# Patient Record
Sex: MALE | Race: WHITE | NOT HISPANIC OR LATINO | Employment: UNEMPLOYED | URBAN - METROPOLITAN AREA
[De-identification: names, ages, dates, MRNs, and addresses within clinical notes are randomized per-mention and may not be internally consistent; named-entity substitution may affect disease eponyms.]

---
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
## 2023-02-22 ENCOUNTER — HOSPITAL ENCOUNTER (EMERGENCY)
Facility: HOSPITAL | Age: 18
Discharge: HOME | End: 2023-02-22
Attending: PEDIATRICS
Payer: MEDICAID

## 2023-02-22 VITALS
OXYGEN SATURATION: 100 % | WEIGHT: 100 LBS | BODY MASS INDEX: 17.71 KG/M2 | TEMPERATURE: 98 F | HEART RATE: 90 BPM | RESPIRATION RATE: 18 BRPM | SYSTOLIC BLOOD PRESSURE: 100 MMHG | DIASTOLIC BLOOD PRESSURE: 60 MMHG

## 2023-02-22 DIAGNOSIS — Q05.7 SPINA BIFIDA OF LUMBAR REGION WITHOUT HYDROCEPHALUS: ICD-10-CM

## 2023-02-22 DIAGNOSIS — N13.70 VESICOURETERAL REFLUX: ICD-10-CM

## 2023-02-22 DIAGNOSIS — N30.00 ACUTE CYSTITIS WITHOUT HEMATURIA: Primary | ICD-10-CM

## 2023-02-22 DIAGNOSIS — R10.9 FLANK PAIN: ICD-10-CM

## 2023-02-22 LAB
BACTERIA #/AREA URNS AUTO: ABNORMAL /HPF
BILIRUB UR QL STRIP: NEGATIVE
CLARITY UR REFRACT.AUTO: ABNORMAL
COLOR UR AUTO: YELLOW
GLUCOSE UR QL STRIP: NEGATIVE
HGB UR QL STRIP: ABNORMAL
HYALINE CASTS UR QL AUTO: 0 /LPF
KETONES UR QL STRIP: NEGATIVE
LEUKOCYTE ESTERASE UR QL STRIP: ABNORMAL
MICROSCOPIC COMMENT: ABNORMAL
NITRITE UR QL STRIP: NEGATIVE
PH UR STRIP: 7 [PH] (ref 5–8)
PROT UR QL STRIP: ABNORMAL
RBC #/AREA URNS AUTO: 31 /HPF (ref 0–4)
SP GR UR STRIP: 1.01 (ref 1–1.03)
URN SPEC COLLECT METH UR: ABNORMAL
WBC #/AREA URNS AUTO: >100 /HPF (ref 0–5)
WBC CLUMPS UR QL AUTO: ABNORMAL

## 2023-02-22 PROCEDURE — 25000003 PHARM REV CODE 250: Performed by: PEDIATRICS

## 2023-02-22 PROCEDURE — 87186 SC STD MICRODIL/AGAR DIL: CPT | Performed by: PEDIATRICS

## 2023-02-22 PROCEDURE — 99285 EMERGENCY DEPT VISIT HI MDM: CPT | Mod: 25

## 2023-02-22 PROCEDURE — 63600175 PHARM REV CODE 636 W HCPCS: Performed by: PEDIATRICS

## 2023-02-22 PROCEDURE — 96374 THER/PROPH/DIAG INJ IV PUSH: CPT

## 2023-02-22 PROCEDURE — 87086 URINE CULTURE/COLONY COUNT: CPT | Performed by: PEDIATRICS

## 2023-02-22 PROCEDURE — 99285 PR EMERGENCY DEPT VISIT,LEVEL V: ICD-10-PCS | Mod: ,,, | Performed by: PEDIATRICS

## 2023-02-22 PROCEDURE — 99285 EMERGENCY DEPT VISIT HI MDM: CPT | Mod: ,,, | Performed by: PEDIATRICS

## 2023-02-22 PROCEDURE — 81001 URINALYSIS AUTO W/SCOPE: CPT | Performed by: PEDIATRICS

## 2023-02-22 PROCEDURE — 87077 CULTURE AEROBIC IDENTIFY: CPT | Performed by: PEDIATRICS

## 2023-02-22 PROCEDURE — 87088 URINE BACTERIA CULTURE: CPT | Performed by: PEDIATRICS

## 2023-02-22 RX ORDER — IBUPROFEN 400 MG/1
400 TABLET ORAL
Status: COMPLETED | OUTPATIENT
Start: 2023-02-22 | End: 2023-02-22

## 2023-02-22 RX ORDER — SULFAMETHOXAZOLE AND TRIMETHOPRIM 800; 160 MG/1; MG/1
1 TABLET ORAL 2 TIMES DAILY
Qty: 14 TABLET | Refills: 0 | Status: ON HOLD | OUTPATIENT
Start: 2023-02-22 | End: 2023-02-26

## 2023-02-22 RX ORDER — SULFAMETHOXAZOLE AND TRIMETHOPRIM 800; 160 MG/1; MG/1
1 TABLET ORAL 2 TIMES DAILY
Qty: 14 TABLET | Refills: 0 | Status: SHIPPED | OUTPATIENT
Start: 2023-02-22 | End: 2023-02-22 | Stop reason: SDUPTHER

## 2023-02-22 RX ORDER — ACETAMINOPHEN 325 MG/1
650 TABLET ORAL
Status: COMPLETED | OUTPATIENT
Start: 2023-02-22 | End: 2023-02-22

## 2023-02-22 RX ORDER — SULFAMETHOXAZOLE AND TRIMETHOPRIM 800; 160 MG/1; MG/1
1 TABLET ORAL
Status: COMPLETED | OUTPATIENT
Start: 2023-02-22 | End: 2023-02-22

## 2023-02-22 RX ORDER — OXYCODONE AND ACETAMINOPHEN 5; 325 MG/1; MG/1
TABLET ORAL
Qty: 18 TABLET | Refills: 0 | Status: ON HOLD | OUTPATIENT
Start: 2023-02-22 | End: 2023-02-26

## 2023-02-22 RX ORDER — MORPHINE SULFATE 2 MG/ML
1.5 INJECTION, SOLUTION INTRAMUSCULAR; INTRAVENOUS
Status: COMPLETED | OUTPATIENT
Start: 2023-02-22 | End: 2023-02-22

## 2023-02-22 RX ADMIN — IBUPROFEN 400 MG: 400 TABLET ORAL at 03:02

## 2023-02-22 RX ADMIN — ACETAMINOPHEN 650 MG: 325 TABLET ORAL at 03:02

## 2023-02-22 RX ADMIN — SULFAMETHOXAZOLE AND TRIMETHOPRIM 1 TABLET: 800; 160 TABLET ORAL at 04:02

## 2023-02-22 RX ADMIN — MORPHINE SULFATE 1.5 MG: 2 INJECTION, SOLUTION INTRAMUSCULAR; INTRAVENOUS at 02:02

## 2023-02-22 NOTE — ED NOTES
Pt brought in via EMS for flank pain, started @ 0800 today   [facility]   Mother gave 2 tylenol ER @ 0830, no relief   Denies fever, emesis or any other symptoms

## 2023-02-22 NOTE — ED PROVIDER NOTES
Encounter Date: 2/22/2023       History     Chief Complaint   Patient presents with    Flank Pain     Hx Spina Bifada/Chronic UTI     17-year-old male with a history of L4-L5 spina bifida since birth.  He had a spinal cord closure at birth.  Patient has a chronic history of bladder dysfunction and recurrent UTI.  His most recent urodynamic study showed  reflux.  Around 8:00 a.m. this morning, the patient was awakened from sleep by right-sided flank pain.  He took Tylenol, but the pain has persisted.  He was unable to get out of bed due to the discomfort and his parents called an ambulance to bring him to the ER.  Patient had an episode of pain like this once in the past associated with food poisoning.  He does not get this symptom with his UTIs.  He denies hematuria or dysuria.  Patient has had no fever, cough or cold symptoms, sore throat, nausea, vomiting, or diarrhea.  He last urinated around 6:00 a.m. today.  He denies trauma or strain.  Patient has a history of a  shunt which the parents report is not functioning and is not causing the patient a problem.  He recently had some tendon releases in his lower limbs.  He takes Flomax, oxybutynin, and Myrbetriq.  The parents report no hospitalizations except those related to surgery.  He has a history of latex allergy at birth.    The history is provided by the patient and a parent.   Review of patient's allergies indicates:  No Known Allergies  No past medical history on file.  No past surgical history on file.  No family history on file.     Review of Systems   Constitutional:  Negative for fever and unexpected weight change.   HENT:  Negative for congestion, rhinorrhea and sore throat.    Eyes:  Negative for discharge.   Respiratory:  Negative for cough.    Gastrointestinal:  Negative for diarrhea, nausea and vomiting.   Genitourinary:  Positive for flank pain. Negative for decreased urine volume, dysuria and hematuria.   Musculoskeletal:  Negative for gait  problem.   Skin:  Negative for rash.   Allergic/Immunologic: Negative for immunocompromised state.   Hematological:  Does not bruise/bleed easily.     Physical Exam     Initial Vitals [02/22/23 1343]   BP Pulse Resp Temp SpO2   100/60 (!) 116 18 97.8 °F (36.6 °C) 99 %      MAP       --         Physical Exam    Nursing note and vitals reviewed.  Constitutional: He appears well-developed and well-nourished. No distress.   Calm, cooperative, no acute distress.  Complaining of back pain.   HENT:   Head: Normocephalic.   Right Ear: External ear normal.   Left Ear: External ear normal.   Mouth/Throat: Oropharynx is clear and moist. No oropharyngeal exudate.   Eyes: Conjunctivae are normal.   Neck: Neck supple.   Cardiovascular:  Normal rate, regular rhythm and normal heart sounds.     Exam reveals no gallop and no friction rub.       No murmur heard.  Pulmonary/Chest: Breath sounds normal. No respiratory distress. He has no wheezes. He has no rhonchi. He has no rales.   Abdominal: Abdomen is soft. Bowel sounds are normal. He exhibits no distension and no mass. There is no abdominal tenderness.   No suprapubic tenderness There is no rebound and no guarding.   Musculoskeletal:         General: No tenderness or edema. Normal range of motion.      Cervical back: Neck supple.      Comments: Bilateral CVA tenderness, worse on the right.  Back without redness, swelling, increased warmth, or tenderness other than noted.  No rash.     Lymphadenopathy:     He has no cervical adenopathy.   Neurological: He is alert and oriented to person, place, and time. He has normal strength.   Skin: Skin is warm and dry. No rash noted.   Psychiatric: His behavior is normal.       ED Course   Procedures  Labs Reviewed   URINALYSIS, REFLEX TO URINE CULTURE - Abnormal; Notable for the following components:       Result Value    Protein, UA 1+ (*)     Occult Blood UA 2+ (*)     Leukocytes, UA 3+ (*)     All other components within normal limits     Narrative:     Specimen Source->Urine   URINALYSIS MICROSCOPIC - Abnormal; Notable for the following components:    RBC, UA 31 (*)     WBC, UA >100 (*)     WBC Clumps, UA Occasional (*)     All other components within normal limits    Narrative:     Specimen Source->Urine   CULTURE, URINE          Imaging Results              US Retroperitoneal Complete (Final result)  Result time 02/22/23 15:46:18   Procedure changed from US Kidney     Final result by David Salinas Jr., MD (02/22/23 15:46:18)                   Impression:      1. No kidney stones.  2. Chronic severe bilateral hydroureteronephrosis in this patient with neurogenic bladder, similar to prior.  3. Debris within the left renal pelvis and bladder.  Correlate with urinalysis.    Electronically signed by resident: Glendy Ortiz  Date:    02/22/2023  Time:    15:23    Electronically signed by: David Morales Jr  Date:    02/22/2023  Time:    15:46               Narrative:    EXAMINATION:  US RETROPERITONEAL COMPLETE    CLINICAL HISTORY:  hx  reflux, questionable hx of small kidney stones, r/o urinary obstruction.;    TECHNIQUE:  Ultrasound of the kidneys and urinary bladder was performed including color flow and Doppler evaluation of the kidneys.    COMPARISON:  Retroperitoneal ultrasound 06/01/2022.    FINDINGS:  Right kidney: The right kidney measures 11.0 cm. No cortical thinning. No loss of corticomedullary distinction. Resistive index measures 0.53.  No mass. No renal stone. Severe hydroureteronephrosis, similar prior.    Left kidney: The left kidney measures 11.6 cm. No cortical thinning. No loss of corticomedullary distinction. Resistive index measures 0.58.  No mass. No renal stone.  Severe hydroureteronephrosis, similar to prior.  There is echogenic debris within the collecting system.    The bladder is partially distended at the time of scanning and is trabeculated with echogenic debris within the lumen of the bladder.                                        Medications   sulfamethoxazole-trimethoprim 800-160mg per tablet 1 tablet (has no administration in time range)   morphine injection 1.5 mg (1.5 mg Intravenous Given 2/22/23 1413)   ibuprofen tablet 400 mg (400 mg Oral Given 2/22/23 1526)   acetaminophen tablet 650 mg (650 mg Oral Given 2/22/23 1526)     Medical Decision Making:   History:   I obtained history from: someone other than patient.  Old Medical Records: I decided to obtain old medical records.  Initial Assessment:   16-year-old male with a history of spina bifida and recurrent UTI presents with right-sided flank pain.  Differential Diagnosis:   Acute cystitis  Pyelonephritis   Urinary obstruction   Kidney stone   Musculoskeletal pain     Clinical Tests:   Lab Tests: Ordered and Reviewed       <> Summary of Lab: UA consistent with acute UTI.  Radiological Study: Ordered and Reviewed  ED Management:  Patient given pain medicine with some improvement.  UA consistent with acute UTI.  Ultrasound largely unchanged from the 1 in June.  Patient without fever, pyelonephritis less likely.  Discussed with family and considered admission for IV antibiotics.  However, they are comfortable taking him home as long as he has adequate pain medication and will follow-up with his kidney doctor at [facility].  Advised family to use ibuprofen as needed for pain and will give a prescription for Percocet as needed for breakthrough pain.  Will send patient home on a prescription for Bactrim which they have used successfully in the past.  Advised to follow-up with their kidney doctor in 2 days.                        Clinical Impression:   Final diagnoses:  [N30.00] Acute cystitis without hematuria (Primary)  [N13.70] Vesicoureteral reflux  [Q05.7] Spina bifida of lumbar region without hydrocephalus - hydrocephalus resolved,  shunt not working  [R10.9] Flank pain        ED Disposition Condition    Discharge Good          ED Prescriptions        Medication Sig Dispense Start Date End Date Auth. Provider    oxyCODONE-acetaminophen (PERCOCET) 5-325 mg per tablet 1-2 tablets every 4-6 hours as needed for pain 18 tablet 2/22/2023 -- Wilfredo Cuellar MD    sulfamethoxazole-trimethoprim 800-160mg (BACTRIM DS) 800-160 mg Tab  (Status: Discontinued) Take 1 tablet by mouth 2 (two) times daily. for 7 days 14 tablet 2/22/2023 2/22/2023 Wilfredo Cuellar MD    sulfamethoxazole-trimethoprim 800-160mg (BACTRIM DS) 800-160 mg Tab Take 1 tablet by mouth 2 (two) times daily. for 7 days 14 tablet 2/22/2023 3/1/2023 Wilfredo Cuellar MD          Follow-up Information       Follow up With Specialties Details Why Contact Info    Chi Escalante MD Pediatric Urology Schedule an appointment as soon as possible for a visit in 2 days  [address] [facility] [address]  [phone]               Wilfredo Cuellar MD  02/22/23 4289

## 2023-02-24 LAB — BACTERIA UR CULT: ABNORMAL

## 2023-02-25 ENCOUNTER — HOSPITAL ENCOUNTER (INPATIENT)
Facility: HOSPITAL | Age: 18
LOS: 3 days | Discharge: HOME HEALTH | DRG: 690 | End: 2023-02-28
Attending: EMERGENCY MEDICINE | Admitting: EMERGENCY MEDICINE
Payer: MEDICAID

## 2023-02-25 DIAGNOSIS — R07.9 CHEST PAIN: ICD-10-CM

## 2023-02-25 DIAGNOSIS — N12 PYELONEPHRITIS: ICD-10-CM

## 2023-02-25 DIAGNOSIS — Q05.7 SPINA BIFIDA OF LUMBAR REGION WITHOUT HYDROCEPHALUS: ICD-10-CM

## 2023-02-25 DIAGNOSIS — Q05.9 MYELOMENINGOCELE: Primary | ICD-10-CM

## 2023-02-25 LAB
ALBUMIN SERPL BCP-MCNC: 4.1 G/DL (ref 3.5–5.2)
ALP SERPL-CCNC: 78 U/L (ref 55–135)
ALT SERPL W/O P-5'-P-CCNC: 14 U/L (ref 10–44)
ANION GAP SERPL CALC-SCNC: 11 MMOL/L (ref 8–16)
AST SERPL-CCNC: 22 U/L (ref 10–40)
BACTERIA #/AREA URNS AUTO: ABNORMAL /HPF
BASOPHILS # BLD AUTO: 0.06 K/UL (ref 0–0.2)
BASOPHILS NFR BLD: 0.6 % (ref 0–1.9)
BILIRUB SERPL-MCNC: 0.5 MG/DL (ref 0.1–1)
BILIRUB UR QL STRIP: NEGATIVE
BUN SERPL-MCNC: 19 MG/DL (ref 6–20)
CALCIUM SERPL-MCNC: 10 MG/DL (ref 8.7–10.5)
CHLORIDE SERPL-SCNC: 100 MMOL/L (ref 95–110)
CLARITY UR REFRACT.AUTO: ABNORMAL
CO2 SERPL-SCNC: 25 MMOL/L (ref 23–29)
COLOR UR AUTO: YELLOW
CREAT SERPL-MCNC: 1.1 MG/DL (ref 0.5–1.4)
DIFFERENTIAL METHOD: ABNORMAL
EOSINOPHIL # BLD AUTO: 0.2 K/UL (ref 0–0.5)
EOSINOPHIL NFR BLD: 1.9 % (ref 0–8)
ERYTHROCYTE [DISTWIDTH] IN BLOOD BY AUTOMATED COUNT: 13 % (ref 11.5–14.5)
EST. GFR  (NO RACE VARIABLE): >60 ML/MIN/1.73 M^2
GLUCOSE SERPL-MCNC: 135 MG/DL (ref 70–110)
GLUCOSE UR QL STRIP: NEGATIVE
HCT VFR BLD AUTO: 44.2 % (ref 40–54)
HGB BLD-MCNC: 14.7 G/DL (ref 14–18)
HGB UR QL STRIP: ABNORMAL
HYALINE CASTS UR QL AUTO: 4 /LPF
IMM GRANULOCYTES # BLD AUTO: 0.02 K/UL (ref 0–0.04)
IMM GRANULOCYTES NFR BLD AUTO: 0.2 % (ref 0–0.5)
KETONES UR QL STRIP: ABNORMAL
LEUKOCYTE ESTERASE UR QL STRIP: ABNORMAL
LYMPHOCYTES # BLD AUTO: 1.7 K/UL (ref 1–4.8)
LYMPHOCYTES NFR BLD: 18.7 % (ref 18–48)
MCH RBC QN AUTO: 28.1 PG (ref 27–31)
MCHC RBC AUTO-ENTMCNC: 33.3 G/DL (ref 32–36)
MCV RBC AUTO: 84 FL (ref 82–98)
MICROSCOPIC COMMENT: ABNORMAL
MONOCYTES # BLD AUTO: 0.9 K/UL (ref 0.3–1)
MONOCYTES NFR BLD: 9.6 % (ref 4–15)
NEUTROPHILS # BLD AUTO: 6.4 K/UL (ref 1.8–7.7)
NEUTROPHILS NFR BLD: 69 % (ref 38–73)
NITRITE UR QL STRIP: NEGATIVE
NRBC BLD-RTO: 0 /100 WBC
PH UR STRIP: 6 [PH] (ref 5–8)
PLATELET # BLD AUTO: 331 K/UL (ref 150–450)
PMV BLD AUTO: 8.3 FL (ref 9.2–12.9)
POTASSIUM SERPL-SCNC: 4.3 MMOL/L (ref 3.5–5.1)
PROT SERPL-MCNC: 8.3 G/DL (ref 6–8.4)
PROT UR QL STRIP: ABNORMAL
RBC # BLD AUTO: 5.24 M/UL (ref 4.6–6.2)
RBC #/AREA URNS AUTO: >100 /HPF (ref 0–4)
SODIUM SERPL-SCNC: 136 MMOL/L (ref 136–145)
SP GR UR STRIP: 1.01 (ref 1–1.03)
SQUAMOUS #/AREA URNS AUTO: 1 /HPF
URN SPEC COLLECT METH UR: ABNORMAL
WBC # BLD AUTO: 9.3 K/UL (ref 3.9–12.7)
WBC #/AREA URNS AUTO: >100 /HPF (ref 0–5)

## 2023-02-25 PROCEDURE — 25500020 PHARM REV CODE 255: Performed by: EMERGENCY MEDICINE

## 2023-02-25 PROCEDURE — 81001 URINALYSIS AUTO W/SCOPE: CPT

## 2023-02-25 PROCEDURE — 63600175 PHARM REV CODE 636 W HCPCS

## 2023-02-25 PROCEDURE — 99285 EMERGENCY DEPT VISIT HI MDM: CPT | Mod: ,,, | Performed by: EMERGENCY MEDICINE

## 2023-02-25 PROCEDURE — 87086 URINE CULTURE/COLONY COUNT: CPT

## 2023-02-25 PROCEDURE — 96375 TX/PRO/DX INJ NEW DRUG ADDON: CPT

## 2023-02-25 PROCEDURE — 99285 EMERGENCY DEPT VISIT HI MDM: CPT | Mod: 25

## 2023-02-25 PROCEDURE — 99285 PR EMERGENCY DEPT VISIT,LEVEL V: ICD-10-PCS | Mod: ,,, | Performed by: EMERGENCY MEDICINE

## 2023-02-25 PROCEDURE — 85025 COMPLETE CBC W/AUTO DIFF WBC: CPT

## 2023-02-25 PROCEDURE — 87186 SC STD MICRODIL/AGAR DIL: CPT

## 2023-02-25 PROCEDURE — 96365 THER/PROPH/DIAG IV INF INIT: CPT

## 2023-02-25 PROCEDURE — 87077 CULTURE AEROBIC IDENTIFY: CPT

## 2023-02-25 PROCEDURE — 25000003 PHARM REV CODE 250

## 2023-02-25 PROCEDURE — 12000002 HC ACUTE/MED SURGE SEMI-PRIVATE ROOM

## 2023-02-25 PROCEDURE — 87088 URINE BACTERIA CULTURE: CPT

## 2023-02-25 PROCEDURE — 80053 COMPREHEN METABOLIC PANEL: CPT

## 2023-02-25 RX ORDER — KETOROLAC TROMETHAMINE 30 MG/ML
15 INJECTION, SOLUTION INTRAMUSCULAR; INTRAVENOUS
Status: COMPLETED | OUTPATIENT
Start: 2023-02-25 | End: 2023-02-25

## 2023-02-25 RX ADMIN — CEFTRIAXONE 1 G: 1 INJECTION, POWDER, FOR SOLUTION INTRAMUSCULAR; INTRAVENOUS at 11:02

## 2023-02-25 RX ADMIN — KETOROLAC TROMETHAMINE 15 MG: 30 INJECTION, SOLUTION INTRAMUSCULAR; INTRAVENOUS at 09:02

## 2023-02-25 RX ADMIN — IOHEXOL 75 ML: 350 INJECTION, SOLUTION INTRAVENOUS at 10:02

## 2023-02-26 PROBLEM — Q07.01 CHIARI MALFORMATION TYPE II: Status: ACTIVE | Noted: 2018-04-25

## 2023-02-26 PROBLEM — Q05.9 MYELOMENINGOCELE: Status: ACTIVE | Noted: 2017-11-15

## 2023-02-26 PROBLEM — K59.2 NEUROGENIC BOWEL: Status: ACTIVE | Noted: 2018-04-25

## 2023-02-26 PROBLEM — E87.6 HYPOKALEMIA: Status: RESOLVED | Noted: 2022-05-31 | Resolved: 2023-02-26

## 2023-02-26 PROBLEM — Z98.2 VP (VENTRICULOPERITONEAL) SHUNT STATUS: Status: ACTIVE | Noted: 2017-11-15

## 2023-02-26 PROBLEM — N12 PYELONEPHRITIS: Status: ACTIVE | Noted: 2023-02-26

## 2023-02-26 PROBLEM — D72.829 LEUKOCYTOSIS: Status: RESOLVED | Noted: 2022-05-31 | Resolved: 2023-02-26

## 2023-02-26 LAB
ANION GAP SERPL CALC-SCNC: 12 MMOL/L (ref 8–16)
BASOPHILS # BLD AUTO: 0.06 K/UL (ref 0–0.2)
BASOPHILS NFR BLD: 0.7 % (ref 0–1.9)
BUN SERPL-MCNC: 24 MG/DL (ref 6–20)
CALCIUM SERPL-MCNC: 9.8 MG/DL (ref 8.7–10.5)
CHLORIDE SERPL-SCNC: 100 MMOL/L (ref 95–110)
CO2 SERPL-SCNC: 23 MMOL/L (ref 23–29)
CREAT SERPL-MCNC: 1 MG/DL (ref 0.5–1.4)
DIFFERENTIAL METHOD: ABNORMAL
EOSINOPHIL # BLD AUTO: 0.2 K/UL (ref 0–0.5)
EOSINOPHIL NFR BLD: 2.1 % (ref 0–8)
ERYTHROCYTE [DISTWIDTH] IN BLOOD BY AUTOMATED COUNT: 13.2 % (ref 11.5–14.5)
EST. GFR  (NO RACE VARIABLE): >60 ML/MIN/1.73 M^2
GLUCOSE SERPL-MCNC: 123 MG/DL (ref 70–110)
HCT VFR BLD AUTO: 42.4 % (ref 40–54)
HGB BLD-MCNC: 13.7 G/DL (ref 14–18)
IMM GRANULOCYTES # BLD AUTO: 0.03 K/UL (ref 0–0.04)
IMM GRANULOCYTES NFR BLD AUTO: 0.3 % (ref 0–0.5)
LYMPHOCYTES # BLD AUTO: 2.4 K/UL (ref 1–4.8)
LYMPHOCYTES NFR BLD: 26.6 % (ref 18–48)
MAGNESIUM SERPL-MCNC: 2.1 MG/DL (ref 1.6–2.6)
MCH RBC QN AUTO: 28 PG (ref 27–31)
MCHC RBC AUTO-ENTMCNC: 32.3 G/DL (ref 32–36)
MCV RBC AUTO: 87 FL (ref 82–98)
MONOCYTES # BLD AUTO: 0.8 K/UL (ref 0.3–1)
MONOCYTES NFR BLD: 8.4 % (ref 4–15)
NEUTROPHILS # BLD AUTO: 5.5 K/UL (ref 1.8–7.7)
NEUTROPHILS NFR BLD: 61.9 % (ref 38–73)
NRBC BLD-RTO: 0 /100 WBC
PHOSPHATE SERPL-MCNC: 4.8 MG/DL (ref 2.7–4.5)
PLATELET # BLD AUTO: 325 K/UL (ref 150–450)
PMV BLD AUTO: 9.1 FL (ref 9.2–12.9)
POTASSIUM SERPL-SCNC: 4.1 MMOL/L (ref 3.5–5.1)
RBC # BLD AUTO: 4.89 M/UL (ref 4.6–6.2)
SODIUM SERPL-SCNC: 135 MMOL/L (ref 136–145)
WBC # BLD AUTO: 8.94 K/UL (ref 3.9–12.7)

## 2023-02-26 PROCEDURE — 25000003 PHARM REV CODE 250

## 2023-02-26 PROCEDURE — 12000002 HC ACUTE/MED SURGE SEMI-PRIVATE ROOM

## 2023-02-26 PROCEDURE — 80048 BASIC METABOLIC PNL TOTAL CA: CPT

## 2023-02-26 PROCEDURE — 99223 PR INITIAL HOSPITAL CARE,LEVL III: ICD-10-PCS | Mod: ,,, | Performed by: INTERNAL MEDICINE

## 2023-02-26 PROCEDURE — 83735 ASSAY OF MAGNESIUM: CPT

## 2023-02-26 PROCEDURE — 63600175 PHARM REV CODE 636 W HCPCS

## 2023-02-26 PROCEDURE — 84100 ASSAY OF PHOSPHORUS: CPT

## 2023-02-26 PROCEDURE — 85025 COMPLETE CBC W/AUTO DIFF WBC: CPT

## 2023-02-26 PROCEDURE — 99223 1ST HOSP IP/OBS HIGH 75: CPT | Mod: ,,, | Performed by: INTERNAL MEDICINE

## 2023-02-26 RX ORDER — IBUPROFEN 200 MG
16 TABLET ORAL
Status: DISCONTINUED | OUTPATIENT
Start: 2023-02-26 | End: 2023-02-28 | Stop reason: HOSPADM

## 2023-02-26 RX ORDER — GLUCAGON 1 MG
1 KIT INJECTION
Status: DISCONTINUED | OUTPATIENT
Start: 2023-02-26 | End: 2023-02-28 | Stop reason: HOSPADM

## 2023-02-26 RX ORDER — TAMSULOSIN HYDROCHLORIDE 0.4 MG/1
1 CAPSULE ORAL NIGHTLY
Status: DISCONTINUED | OUTPATIENT
Start: 2023-02-26 | End: 2023-02-28 | Stop reason: HOSPADM

## 2023-02-26 RX ORDER — AMOXICILLIN 250 MG
1 CAPSULE ORAL DAILY
Status: DISCONTINUED | OUTPATIENT
Start: 2023-02-26 | End: 2023-02-28 | Stop reason: HOSPADM

## 2023-02-26 RX ORDER — SODIUM CHLORIDE 0.9 % (FLUSH) 0.9 %
10 SYRINGE (ML) INJECTION EVERY 12 HOURS PRN
Status: DISCONTINUED | OUTPATIENT
Start: 2023-02-26 | End: 2023-02-28 | Stop reason: HOSPADM

## 2023-02-26 RX ORDER — POLYETHYLENE GLYCOL 3350 17 G/17G
17 POWDER, FOR SOLUTION ORAL DAILY
Status: DISCONTINUED | OUTPATIENT
Start: 2023-02-26 | End: 2023-02-28 | Stop reason: HOSPADM

## 2023-02-26 RX ORDER — TAMSULOSIN HYDROCHLORIDE 0.4 MG/1
1 CAPSULE ORAL NIGHTLY
Status: DISCONTINUED | OUTPATIENT
Start: 2023-02-27 | End: 2023-02-26

## 2023-02-26 RX ORDER — OXYCODONE HYDROCHLORIDE 5 MG/1
5 TABLET ORAL EVERY 6 HOURS PRN
Status: DISCONTINUED | OUTPATIENT
Start: 2023-02-26 | End: 2023-02-28 | Stop reason: HOSPADM

## 2023-02-26 RX ORDER — HYDROCODONE BITARTRATE AND ACETAMINOPHEN 5; 325 MG/1; MG/1
1 TABLET ORAL
Status: COMPLETED | OUTPATIENT
Start: 2023-02-26 | End: 2023-02-26

## 2023-02-26 RX ORDER — IBUPROFEN 200 MG
24 TABLET ORAL
Status: DISCONTINUED | OUTPATIENT
Start: 2023-02-26 | End: 2023-02-28 | Stop reason: HOSPADM

## 2023-02-26 RX ORDER — NALOXONE HCL 0.4 MG/ML
0.02 VIAL (ML) INJECTION
Status: DISCONTINUED | OUTPATIENT
Start: 2023-02-26 | End: 2023-02-28 | Stop reason: HOSPADM

## 2023-02-26 RX ADMIN — OXYCODONE HYDROCHLORIDE 5 MG: 5 TABLET ORAL at 05:02

## 2023-02-26 RX ADMIN — HYDROCODONE BITARTRATE AND ACETAMINOPHEN 1 TABLET: 5; 325 TABLET ORAL at 01:02

## 2023-02-26 RX ADMIN — CEFTRIAXONE 2 G: 2 INJECTION, POWDER, FOR SOLUTION INTRAMUSCULAR; INTRAVENOUS at 11:02

## 2023-02-26 RX ADMIN — OXYCODONE HYDROCHLORIDE 5 MG: 5 TABLET ORAL at 11:02

## 2023-02-26 RX ADMIN — OXYBUTYNIN CHLORIDE 15 MG: 10 TABLET, EXTENDED RELEASE ORAL at 08:02

## 2023-02-26 RX ADMIN — SENNOSIDES AND DOCUSATE SODIUM 1 TABLET: 50; 8.6 TABLET ORAL at 08:02

## 2023-02-26 RX ADMIN — OXYCODONE HYDROCHLORIDE 5 MG: 5 TABLET ORAL at 09:02

## 2023-02-26 RX ADMIN — TAMSULOSIN HYDROCHLORIDE 0.4 MG: 0.4 CAPSULE ORAL at 09:02

## 2023-02-26 RX ADMIN — POLYETHYLENE GLYCOL 3350 17 G: 17 POWDER, FOR SOLUTION ORAL at 08:02

## 2023-02-26 NOTE — MEDICAL/APP STUDENT
Hospital Medicine  History and Physical Exam    Team: [facility] 2 Augustine Caal  Admit Date: 2/25/2023  JOSIAH   Principal Problem:  Pyelonephritis   Patient information was obtained from patient, parent, and ER records.   Primary care Physician: Hazel Monsalve MD  Code status: Full Code      Subjective:     HPI: Kenji Malone is a 17 y.o. male with a history of spina bifida in L4-L5, VUR, bilateral hydronephrosis, recurrent UTIs presented for dysuria & severe bilateral flank pain. He was initially admitted on Wed (2/22/23) for a UTI with mild R-sided flank pain. He was given a course of home Bactrim and completed it, but then started experiencing burning with urination, foul-smelling, darker urine, with now bilateral flank pain 10/10. He was afebrile, no n/v/d, no recent traumas/falls, no changes in sensation. He has not had a BM since Wednesday, normally 1-2 BM daily.     Past Medical History:   Past Medical History:   Diagnosis Date    Bilateral hydronephrosis 6/1/2022    Neurogenic bladder 6/1/2022    Spina bifida of lumbar region without hydrocephalus 2/22/2023    hydrocephalus resolved,  shunt not working         Past Surgical History:   - Tendon release procedure   -  shunt     Social History:   - Lives in [place] with mom  - Home-schooled      Family History: family history is not on file.    Medications:   Prior to Admission medications    Medication Sig Start Date End Date Taking? Authorizing Provider   MYRBETRIQ 50 mg Tb24 Take 1 tablet by mouth nightly. 4/30/22  Yes Historical Provider   oxybutynin (DITROPAN XL) 15 MG TR24 Take 15 mg by mouth every morning. 4/30/22  Yes Historical Provider   tamsulosin (FLOMAX) 0.4 mg Cap Take 1 capsule by mouth nightly. 4/30/22  Yes Historical Provider   senna (SENOKOT) 8.6 mg tablet Take 1 tablet by mouth 2 (two) times a day. For constipation 6/2/22   Caren Lee PA-C   oxyCODONE-acetaminophen (PERCOCET) 5-325 mg per tablet 1-2 tablets every 4-6 hours as  needed for pain 2/22/23 2/26/23  Wilfredo Cuellar MD   sulfamethoxazole-trimethoprim 800-160mg (BACTRIM DS) 800-160 mg Tab Take 1 tablet by mouth 2 (two) times daily. for 7 days 2/22/23 2/26/23  Wilfredo Cuellar MD       Allergies: Patient is allergic to latex, natural rubber.    Review of Systems  Review of Systems   Constitutional:  Negative for chills, fever and weight loss.   Eyes:  Negative for blurred vision.   Respiratory:  Negative for cough and shortness of breath.    Cardiovascular:  Negative for chest pain and leg swelling.   Gastrointestinal:  Positive for constipation. Negative for abdominal pain, heartburn, nausea and vomiting.   Genitourinary:  Positive for dysuria and flank pain.   Musculoskeletal:  Positive for back pain. Negative for falls.   Skin:  Negative for itching and rash.   Neurological:  Negative for tremors, sensory change, weakness and headaches.       Objective:     Physical Exam  Temp:  [97.4 °F (36.3 °C)-98.6 °F (37 °C)]   Pulse:  [73-96]   Resp:  [16-20]   BP: (101-119)/(61-70)   SpO2:  [95 %-100 %]   There is no height or weight on file to calculate BMI.     Physical Exam  Constitutional:       General: He is not in acute distress.     Appearance: Normal appearance.   HENT:      Head: Normocephalic.      Mouth/Throat:      Mouth: Mucous membranes are moist.   Cardiovascular:      Rate and Rhythm: Normal rate and regular rhythm.      Pulses: Normal pulses.      Heart sounds: Normal heart sounds.   Pulmonary:      Effort: Pulmonary effort is normal.      Breath sounds: Normal breath sounds.   Abdominal:      General: Abdomen is flat. Bowel sounds are normal.      Palpations: Abdomen is soft.      Tenderness: There is right CVA tenderness and left CVA tenderness. There is no guarding.   Skin:     General: Skin is warm.   Neurological:      General: No focal deficit present.      Mental Status: He is alert and oriented to person, place, and time.      Sensory: No sensory deficit.       Motor: No weakness.         Recent Labs   Lab 02/25/23 2102 02/26/23  0343   WBC 9.30 8.94   HGB 14.7 13.7*   HCT 44.2 42.4    325     Recent Labs   Lab 02/25/23 2102 02/26/23  0343    135*   K 4.3 4.1    100   CO2 25 23   BUN 19 24*   CREATININE 1.1 1.0   * 123*   CALCIUM 10.0 9.8   MG  --  2.1   PHOS  --  4.8*     Recent Labs   Lab 02/25/23 2102   ALKPHOS 78   ALT 14   AST 22   ALBUMIN 4.1   PROT 8.3   BILITOT 0.5      No results for input(s): POCTGLUCOSE in the last 168 hours.  No results for input(s): LACTATE in the last 72 hours.     No results for input(s): CPK, CPKMB, MB, TROPONINI in the last 72 hours.  No results found for: HGBA1C    Active Hospital Problems    Diagnosis  POA    *Pyelonephritis [N12]  Yes    Spina bifida of lumbar region without hydrocephalus [Q05.7]  Not Applicable     hydrocephalus resolved,  shunt not working      Vesicoureteral reflux [N13.70]  Yes    Bilateral hydronephrosis [N13.30]  Yes    Neurogenic bladder [N31.9]  Yes     (ventriculoperitoneal) shunt status [Z98.2]  Yes      Resolved Hospital Problems   No resolved problems to display.           Assessment/Plan:     Kenji Malone is a 17y man with PMHx of spina bifida L4-L5, VUR, bilateral hydronephrosis, recurrent UTIs is here for dysuria & flank pain is on IV ceftriaxone and his VSS. His flank pain is reported 4/10 and improving.     Likely complicated pyelonephritis d/t flank pain, dysuria, pyuria and unchanged CT Abdo with contrast (compared to 5/31/22). Afebrile & no WBC casts detected, but this does not exclude pyelonephritis (UpToDate)     Ddx:   - Nephrolithiasis (Abdo US negative for stones),   - UTI (pain not localized to urethra),   - Psoas abscess (Patient appears to be responding to empiric Abx. Abdominal CT w/ contrast negative for abscess),   - Hydronephrosis (Abdo US negative for acute changes to chronic bilateral hydronephrosis)    Complicated pyelonephritis - afebrile, flank pain  improving  - IVFs  - IV Ceftriaxone   - Urine cultures pending  - Tylenol PRN for pain   - Monitor for dysuria    Bilateral hydronephrosis- unchanged per CT Abdo with contrast   - Tamsulosin    Spina bifida - unchanged and stable   -  shunt not intact  - F/u neurology outpatient    VUR - unchanged  - New home antibiotics regimen after culture results    Neurogenic bladder - unchanged and stable   - Mirabegron  - Oxybutinin    Constipation  - Miralax 1x dose  - Monitor for BM        Code: Full  Diet: Normal  GI PPx:   DVT PPx: None  Lines:  Drains:  Airways:    Wounds:     Goals of Care: Set him up with appropriate antibiotics, monitor for signs of sepsis, ensure adequate hydration  Discharge plan: Pending urine cultures and bowel movement        Augustine Caal, MS3  [facility]

## 2023-02-26 NOTE — ASSESSMENT & PLAN NOTE
Patient complaining of bilateral mid rib back pain.  History of bilateral hydronephrosis, however after discussion with patient and mother pain is new and only began several days ago and generally reports no pain at all at baseline.  Patient on Flomax, oxybutynin and Myrbetriq at home managed by his pediatric urologist  - Resume home Flomax  - Resume home oxybutynin

## 2023-02-26 NOTE — ED TRIAGE NOTES
Patient arrives to ED via EMS. Patient mother reports that the patient was seen here in the ED on Wednesday and was diagnosed with a UTI and started on bactrim. Patient reports that he has been having constant pain to his bilateral back. States that he has been having difficulty urinating as well as dysuria. Patient mother reports that patient has history of frequent UTIs. Patient has a history of Spina Bifida.

## 2023-02-26 NOTE — ASSESSMENT & PLAN NOTE
Stool burden noted on imaging.  Patient reinforces that he is not made stool despite good p.o. intake.    - Will start bowel regimen

## 2023-02-26 NOTE — ASSESSMENT & PLAN NOTE
This is a 17-year-old male with significant past medical history of spina bifida, vesicourethral reflux, and bilateral hydronephrosis presenting to the ED  after recent diagnosis of UTI being treated with Bactrim for pansensitive Proteus species several days ago  due to worsening bilateral mid back pain, burning upon urination, and alteration in urine color observed by mother   UA positive for gram negative rods, pending susceptibilities    - Continue ceftriaxone

## 2023-02-26 NOTE — PHARMACY MED REC
"Admission Medication History     The home medication history was taken by Antwon Navarro.    You may go to "Admission" then "Reconcile Home Medications" tabs to review and/or act upon these items.     The home medication list has been updated by the Pharmacy department.   Please read ALL comments highlighted in yellow.   Please address this information as you see fit.    Feel free to contact us if you have any questions or require assistance.      The medications listed below were removed from the home medication list. Please reorder if appropriate:  Patient reports no longer taking the following medication(s):  OXYCODONE-ACETAMINOPHEN 5-325 MG   SULFAMETHOXAZOLE-TRIMETHOPRIM 800-160 MG     Medications listed below were obtained from: Patient/family  PTA Medications   Medication Sig    MYRBETRIQ 50 mg Tb24   Take 1 tablet by mouth nightly.    oxybutynin (DITROPAN XL) 15 MG TR24   Take 15 mg by mouth every morning.    tamsulosin (FLOMAX) 0.4 mg Cap   Take 1 capsule by mouth nightly.    senna (SENOKOT) 8.6 mg tablet Take 1 tablet by mouth 2 (two) times a day. For constipation     Potential issues to be addressed PRIOR TO DISCHARGE  Patient reported not taking the following medications: (SENNA 8.6 MG). These medications remain on the home medication list. Please address accordingly.   Prescriptions could not be filled prior to admission: (SENNA 8.6 MG)    Antwon Navarro  EXT [id number]                 .        "

## 2023-02-26 NOTE — SUBJECTIVE & OBJECTIVE
No past medical history on file.    No past surgical history on file.    Review of patient's allergies indicates:   Allergen Reactions    Latex, natural rubber        No current facility-administered medications on file prior to encounter.     Current Outpatient Medications on File Prior to Encounter   Medication Sig    MYRBETRIQ 50 mg Tb24 Take 1 tablet by mouth once daily.    oxybutynin (DITROPAN XL) 15 MG TR24 Take 15 mg by mouth once daily.    oxyCODONE-acetaminophen (PERCOCET) 5-325 mg per tablet 1-2 tablets every 4-6 hours as needed for pain    senna (SENOKOT) 8.6 mg tablet Take 1 tablet by mouth 2 (two) times a day. For constipation    sulfamethoxazole-trimethoprim 800-160mg (BACTRIM DS) 800-160 mg Tab Take 1 tablet by mouth 2 (two) times daily. for 7 days    tamsulosin (FLOMAX) 0.4 mg Cap Take 1 capsule by mouth nightly.     Family History    None       Tobacco Use    Smoking status: Not on file    Smokeless tobacco: Not on file   Substance and Sexual Activity    Alcohol use: Not on file    Drug use: Not on file    Sexual activity: Not on file     Review of Systems   Constitutional:  Negative for appetite change, chills, diaphoresis and fever.   Respiratory:  Negative for chest tightness and shortness of breath.    Cardiovascular:  Negative for chest pain and leg swelling.   Gastrointestinal:  Positive for constipation. Negative for abdominal distention, abdominal pain, diarrhea, nausea and vomiting.   Genitourinary:  Positive for difficulty urinating (significant uro hx), flank pain and hematuria.   Musculoskeletal:  Positive for back pain and gait problem (baseline).   Neurological:  Negative for dizziness and light-headedness.   Psychiatric/Behavioral:  Negative for agitation, confusion and decreased concentration.    Objective:     Vital Signs (Most Recent):  Temp: 97.7 °F (36.5 °C) (02/26/23 0112)  Pulse: 73 (02/26/23 0109)  Resp: 18 (02/26/23 0110)  BP: 103/70 (02/26/23 0101)  SpO2: 97 % (02/26/23  0109) Vital Signs (24h Range):  Temp:  [97.7 °F (36.5 °C)-97.8 °F (36.6 °C)] 97.7 °F (36.5 °C)  Pulse:  [73-96] 73  Resp:  [16-20] 18  SpO2:  [95 %-100 %] 97 %  BP: (101-119)/(61-70) 103/70        There is no height or weight on file to calculate BMI.    Physical Exam  Constitutional:       General: He is not in acute distress.     Appearance: Normal appearance. He is not ill-appearing, toxic-appearing or diaphoretic.   HENT:      Head: Normocephalic and atraumatic.      Nose: No congestion or rhinorrhea.   Cardiovascular:      Rate and Rhythm: Normal rate and regular rhythm.      Pulses: Normal pulses.      Heart sounds: No murmur heard.  Pulmonary:      Effort: Pulmonary effort is normal. No respiratory distress.      Breath sounds: Normal breath sounds. No wheezing.   Abdominal:      General: Abdomen is flat. There is no distension.      Palpations: Abdomen is soft.      Tenderness: There is no abdominal tenderness.   Musculoskeletal:         General: Tenderness (B/L mid back) present.   Skin:     General: Skin is warm and dry.   Neurological:      General: No focal deficit present.      Mental Status: He is alert and oriented to person, place, and time.   Psychiatric:         Mood and Affect: Mood normal.         Behavior: Behavior normal.           Significant Labs: All pertinent labs within the past 24 hours have been reviewed.    Significant Imaging: I have reviewed all pertinent imaging results/findings within the past 24 hours.

## 2023-02-26 NOTE — H&P
[facility]  [facility]  History & Physical    Patient Name: Kenji Malone  MRN: [id number]  Patient Class: OP- Observation  Admission Date: 2/25/2023  Attending Physician: Gina Swann MD   Primary Care Provider: Hazel Monsalve MD         Patient information was obtained from patient, parent and ER records.     Subjective:     Principal Problem:Pyelonephritis    Chief Complaint:   Chief Complaint   Patient presents with    Flank Pain     Pt was diagnosed Wed with UTI and put on Bactrim, pt mother feels the antibiotic isnt working and pt now having flank pain and increased difficulty urinating. Pt having difficulty ambulating with pain. History of spina bifeda         HPI: Mr. Malone is a 17-year-old male with significant past medical history of spina bifida, bilateral hydronephrosis, neurogenic bladder, and vesicourethral reflux presenting to the ED along with his mother after 3 days of worsening bilateral mid rib/back pain, burning upon urination, and discoloration of urine stream.  Several days ago patient was found to have a UTI of pansensitive Proteus with his only symptom at the time being bilateral mid back pain.  He was sent home on Bactrim which which he has been taking as prescribed.  The pain worsened to the point of discomfort when taking deep breaths, with new onset burning upon urination, and discoloration of urine. His mother states that it is not unusual for him to have UTIs however it has been quite sometime since his last 1, and this is the worst symptom wise it has ever been. Patient denies recent fevers, chills, sweats, chest pain, shortness of breath, or N/V/D.  He follows up with urology regularly.       No past medical history on file.    No past surgical history on file.    Review of patient's allergies indicates:   Allergen Reactions    Latex, natural rubber        No current facility-administered medications on file prior to encounter.     Current Outpatient  Medications on File Prior to Encounter   Medication Sig    MYRBETRIQ 50 mg Tb24 Take 1 tablet by mouth once daily.    oxybutynin (DITROPAN XL) 15 MG TR24 Take 15 mg by mouth once daily.    oxyCODONE-acetaminophen (PERCOCET) 5-325 mg per tablet 1-2 tablets every 4-6 hours as needed for pain    senna (SENOKOT) 8.6 mg tablet Take 1 tablet by mouth 2 (two) times a day. For constipation    sulfamethoxazole-trimethoprim 800-160mg (BACTRIM DS) 800-160 mg Tab Take 1 tablet by mouth 2 (two) times daily. for 7 days    tamsulosin (FLOMAX) 0.4 mg Cap Take 1 capsule by mouth nightly.     Family History    None       Tobacco Use    Smoking status: Not on file    Smokeless tobacco: Not on file   Substance and Sexual Activity    Alcohol use: Not on file    Drug use: Not on file    Sexual activity: Not on file     Review of Systems   Constitutional:  Negative for appetite change, chills, diaphoresis and fever.   Respiratory:  Negative for chest tightness and shortness of breath.    Cardiovascular:  Negative for chest pain and leg swelling.   Gastrointestinal:  Positive for constipation. Negative for abdominal distention, abdominal pain, diarrhea, nausea and vomiting.   Genitourinary:  Positive for difficulty urinating (significant uro hx), flank pain and hematuria.   Musculoskeletal:  Positive for back pain and gait problem (baseline).   Neurological:  Negative for dizziness and light-headedness.   Psychiatric/Behavioral:  Negative for agitation, confusion and decreased concentration.    Objective:     Vital Signs (Most Recent):  Temp: 97.7 °F (36.5 °C) (02/26/23 0112)  Pulse: 73 (02/26/23 0109)  Resp: 18 (02/26/23 0110)  BP: 103/70 (02/26/23 0101)  SpO2: 97 % (02/26/23 0109) Vital Signs (24h Range):  Temp:  [97.7 °F (36.5 °C)-97.8 °F (36.6 °C)] 97.7 °F (36.5 °C)  Pulse:  [73-96] 73  Resp:  [16-20] 18  SpO2:  [95 %-100 %] 97 %  BP: (101-119)/(61-70) 103/70        There is no height or weight on file to calculate BMI.    Physical  Exam  Constitutional:       General: He is not in acute distress.     Appearance: Normal appearance. He is not ill-appearing, toxic-appearing or diaphoretic.   HENT:      Head: Normocephalic and atraumatic.      Nose: No congestion or rhinorrhea.   Cardiovascular:      Rate and Rhythm: Normal rate and regular rhythm.      Pulses: Normal pulses.      Heart sounds: No murmur heard.  Pulmonary:      Effort: Pulmonary effort is normal. No respiratory distress.      Breath sounds: Normal breath sounds. No wheezing.   Abdominal:      General: Abdomen is flat. There is no distension.      Palpations: Abdomen is soft.      Tenderness: There is no abdominal tenderness.   Musculoskeletal:         General: Tenderness (B/L mid back) present.   Skin:     General: Skin is warm and dry.   Neurological:      General: No focal deficit present.      Mental Status: He is alert and oriented to person, place, and time.   Psychiatric:         Mood and Affect: Mood normal.         Behavior: Behavior normal.           Significant Labs: All pertinent labs within the past 24 hours have been reviewed.    Significant Imaging: I have reviewed all pertinent imaging results/findings within the past 24 hours.    Assessment/Plan:     * Pyelonephritis  This is a 17-year-old male with significant past medical history of spina bifida, vesicourethral reflux, and bilateral hydronephrosis presenting to the ED  after recent diagnosis of UTI being treated with Bactrim for pansensitive Proteus species several days go  due to worsening bilateral mid back pain, burning upon urination, and alteration in urine color observed by mother   - UA positive for many bacteria, white blood cell, and blood   - patient started on ceftriaxone in the ED,  will continue  - daily CBC, BMP, initial  labs unremarkable   - urine culture pending       Spina bifida of lumbar region without hydrocephalus  Patient with known hx of spina bifida and associated urological pathology        Vesicoureteral reflux  See bilateral hydronephrosis       Constipation  Stool burden noted on imaging.  Patient reinforces that he is not made stool despite good p.o. intake.    - Will start bowel regimen      Bilateral hydronephrosis  Patient complaining of bilateral mid rib back pain.  History of bilateral hydronephrosis, however after discussion with patient and mother pain is new and only began several days ago and generally reports no pain at all at baseline.  Patient on Flomax, oxybutynin and Myrbetriq at home managed by his pediatric urologist  - Resume home Flomax  - Resume home oxybutynin       Neurogenic bladder  See bilateral hydronephrosis       VTE Risk Mitigation (From admission, onward)           Ordered     IP VTE LOW RISK PATIENT  Once         02/26/23 0109     Place sequential compression device  Until discontinued         02/26/23 0109                       [place], DO  Department of Hospital Medicine   [facility] - Emergency Dept

## 2023-02-26 NOTE — ASSESSMENT & PLAN NOTE
This is a 17-year-old male with significant past medical history of spina bifida, vesicourethral reflux, and bilateral hydronephrosis presenting to the ED by EMS after recent diagnosis of UTI being treated with Bactrim for pansensitive Proteus species several days go  due to worsening bilateral mid back pain, burning upon urination, and alteration in urine color observed by mother   - UA positive for many bacteria, white blood cell, and blood   - patient started on ceftriaxone in the ED,  will continue  - daily CBC, BMP, initial  labs unremarkable   - urine culture pending

## 2023-02-26 NOTE — HPI
Mr. Malone is a 17-year-old male with significant past medical history of spina bifida, bilateral hydronephrosis, neurogenic bladder, and vesicourethral reflux presenting to the ED  along with his mother after 3 days of worsening bilateral mid rib/back pain, burning upon urination, and discoloration of urine stream.  Several days ago patient was found to have a UTI of pansensitive Proteus with his only symptom at the time being bilateral mid back pain.  He was sent home on Bactrim which which he has been taking as prescribed.  The pain worsened to the point of discomfort when taking deep breaths, with new onset burning upon urination, and discoloration of urine.  His mother states that it is not unusual for him to have UTIs however it has been quite sometime since his last 1, and this is the worst symptom wise it has ever been. Patient denies recent fevers, chills, sweats, chest pain, shortness of breath, or N/V/D.  He follows up with urology regularly. IN the ED he was given CTX, and a ketorolac injection.

## 2023-02-27 LAB
ANION GAP SERPL CALC-SCNC: 11 MMOL/L (ref 8–16)
BACTERIA UR CULT: ABNORMAL
BASOPHILS # BLD AUTO: 0.05 K/UL (ref 0–0.2)
BASOPHILS NFR BLD: 0.6 % (ref 0–1.9)
BUN SERPL-MCNC: 21 MG/DL (ref 6–20)
CALCIUM SERPL-MCNC: 9.1 MG/DL (ref 8.7–10.5)
CHLORIDE SERPL-SCNC: 100 MMOL/L (ref 95–110)
CO2 SERPL-SCNC: 23 MMOL/L (ref 23–29)
CREAT SERPL-MCNC: 0.8 MG/DL (ref 0.5–1.4)
DIFFERENTIAL METHOD: ABNORMAL
EOSINOPHIL # BLD AUTO: 0.3 K/UL (ref 0–0.5)
EOSINOPHIL NFR BLD: 3.4 % (ref 0–8)
ERYTHROCYTE [DISTWIDTH] IN BLOOD BY AUTOMATED COUNT: 13.1 % (ref 11.5–14.5)
EST. GFR  (NO RACE VARIABLE): >60 ML/MIN/1.73 M^2
GLUCOSE SERPL-MCNC: 104 MG/DL (ref 70–110)
HCT VFR BLD AUTO: 39.5 % (ref 40–54)
HGB BLD-MCNC: 12.7 G/DL (ref 14–18)
IMM GRANULOCYTES # BLD AUTO: 0.01 K/UL (ref 0–0.04)
IMM GRANULOCYTES NFR BLD AUTO: 0.1 % (ref 0–0.5)
LYMPHOCYTES # BLD AUTO: 2.5 K/UL (ref 1–4.8)
LYMPHOCYTES NFR BLD: 29.7 % (ref 18–48)
MAGNESIUM SERPL-MCNC: 1.9 MG/DL (ref 1.6–2.6)
MCH RBC QN AUTO: 27.5 PG (ref 27–31)
MCHC RBC AUTO-ENTMCNC: 32.2 G/DL (ref 32–36)
MCV RBC AUTO: 86 FL (ref 82–98)
MONOCYTES # BLD AUTO: 0.8 K/UL (ref 0.3–1)
MONOCYTES NFR BLD: 9.3 % (ref 4–15)
NEUTROPHILS # BLD AUTO: 4.7 K/UL (ref 1.8–7.7)
NEUTROPHILS NFR BLD: 56.9 % (ref 38–73)
NRBC BLD-RTO: 0 /100 WBC
PHOSPHATE SERPL-MCNC: 4.6 MG/DL (ref 2.7–4.5)
PLATELET # BLD AUTO: 312 K/UL (ref 150–450)
PMV BLD AUTO: 8.7 FL (ref 9.2–12.9)
POTASSIUM SERPL-SCNC: 4.1 MMOL/L (ref 3.5–5.1)
RBC # BLD AUTO: 4.62 M/UL (ref 4.6–6.2)
SODIUM SERPL-SCNC: 134 MMOL/L (ref 136–145)
WBC # BLD AUTO: 8.28 K/UL (ref 3.9–12.7)

## 2023-02-27 PROCEDURE — 12000002 HC ACUTE/MED SURGE SEMI-PRIVATE ROOM

## 2023-02-27 PROCEDURE — 85025 COMPLETE CBC W/AUTO DIFF WBC: CPT

## 2023-02-27 PROCEDURE — 99232 SBSQ HOSP IP/OBS MODERATE 35: CPT | Mod: ,,, | Performed by: HOSPITALIST

## 2023-02-27 PROCEDURE — 63600175 PHARM REV CODE 636 W HCPCS

## 2023-02-27 PROCEDURE — 25000003 PHARM REV CODE 250

## 2023-02-27 PROCEDURE — 83735 ASSAY OF MAGNESIUM: CPT

## 2023-02-27 PROCEDURE — 84100 ASSAY OF PHOSPHORUS: CPT

## 2023-02-27 PROCEDURE — 80048 BASIC METABOLIC PNL TOTAL CA: CPT

## 2023-02-27 PROCEDURE — 36415 COLL VENOUS BLD VENIPUNCTURE: CPT

## 2023-02-27 PROCEDURE — 99232 PR SUBSEQUENT HOSPITAL CARE,LEVL II: ICD-10-PCS | Mod: ,,, | Performed by: HOSPITALIST

## 2023-02-27 PROCEDURE — 25000003 PHARM REV CODE 250: Performed by: STUDENT IN AN ORGANIZED HEALTH CARE EDUCATION/TRAINING PROGRAM

## 2023-02-27 RX ORDER — PSEUDOEPHEDRINE/ACETAMINOPHEN 30MG-500MG
100 TABLET ORAL
Status: COMPLETED | OUTPATIENT
Start: 2023-02-27 | End: 2023-02-27

## 2023-02-27 RX ADMIN — TAMSULOSIN HYDROCHLORIDE 0.4 MG: 0.4 CAPSULE ORAL at 09:02

## 2023-02-27 RX ADMIN — OXYCODONE HYDROCHLORIDE 5 MG: 5 TABLET ORAL at 09:02

## 2023-02-27 RX ADMIN — SENNOSIDES AND DOCUSATE SODIUM 1 TABLET: 50; 8.6 TABLET ORAL at 09:02

## 2023-02-27 RX ADMIN — OXYCODONE HYDROCHLORIDE 5 MG: 5 TABLET ORAL at 03:02

## 2023-02-27 RX ADMIN — SODIUM CHLORIDE 500 ML: 9 INJECTION, SOLUTION INTRAVENOUS at 04:02

## 2023-02-27 RX ADMIN — CEFTRIAXONE 2 G: 2 INJECTION, POWDER, FOR SOLUTION INTRAMUSCULAR; INTRAVENOUS at 09:02

## 2023-02-27 RX ADMIN — POLYETHYLENE GLYCOL 3350 17 G: 17 POWDER, FOR SOLUTION ORAL at 09:02

## 2023-02-27 RX ADMIN — Medication 100 ML: at 04:02

## 2023-02-27 RX ADMIN — OXYBUTYNIN CHLORIDE 15 MG: 10 TABLET, EXTENDED RELEASE ORAL at 09:02

## 2023-02-27 NOTE — PLAN OF CARE
Problem: Adult Inpatient Plan of Care  Goal: Plan of Care Review  Outcome: Ongoing, Progressing  Flowsheets (Taken 2/27/2023 1754)  Plan of Care Reviewed With:   patient   parent     Problem: Adult Inpatient Plan of Care  Goal: Absence of Hospital-Acquired Illness or Injury  Intervention: Identify and Manage Fall Risk  Flowsheets (Taken 2/27/2023 1754)  Safety Promotion/Fall Prevention:   assistive device/personal item within reach   family to remain at bedside   Fall Risk reviewed with patient/family     Problem: Adult Inpatient Plan of Care  Goal: Readiness for Transition of Care  Outcome: Ongoing, Progressing   Pt is AAOx4,VS WNL on room air. Pt has large BM after enema,verbalized relief . Pt turns and repositions independently,ambulates with assist. No skin breakdown noted. Pt pain and safety monitored q 1-2 hrs this shift .Bed locked and in lowest position. Rails elevated x 3. Brakes on. Call light and personal belongings in reach. Will continue monitor.

## 2023-02-27 NOTE — SUBJECTIVE & OBJECTIVE
Interval History: The patient is afebrile. He continues to have flank tenderness, more prominent on the left side. He denies chest pain, shortness of breath, or palpitations. The patient has not had a bowel movement since Wednesday. He is on senna and miralax.  An enema and or suppository will be considered if needed. Otherwise no acute events. The patient is eating his breakfast at the time of examination.     Review of Systems   Constitutional:  Negative for appetite change, chills, diaphoresis and fever.   Respiratory:  Negative for chest tightness and shortness of breath.    Cardiovascular:  Negative for chest pain and leg swelling.   Gastrointestinal:  Positive for constipation. Negative for abdominal distention, abdominal pain, diarrhea, nausea and vomiting.   Genitourinary:  Positive for difficulty urinating (significant uro hx), dysuria and flank pain.   Musculoskeletal:  Positive for back pain and gait problem (baseline).   Neurological:  Negative for dizziness and light-headedness.   Psychiatric/Behavioral:  Negative for agitation, confusion and decreased concentration.    Objective:     Vital Signs (Most Recent):  Temp: 98.1 °F (36.7 °C) (02/27/23 0755)  Pulse: 94 (02/27/23 0755)  Resp: 18 (02/27/23 0919)  BP: (!) 115/56 (02/27/23 0755)  SpO2: 97 % (02/27/23 0755)   Vital Signs (24h Range):  Temp:  [97.6 °F (36.4 °C)-98.2 °F (36.8 °C)] 98.1 °F (36.7 °C)  Pulse:  [75-94] 94  Resp:  [17-18] 18  SpO2:  [93 %-97 %] 97 %  BP: (102-115)/(55-70) 115/56     Weight: 45.4 kg (100 lb 1.4 oz)  Body mass index is 17.73 kg/m².    Intake/Output Summary (Last 24 hours) at 2/27/2023 1131  Last data filed at 2/27/2023 0635  Gross per 24 hour   Intake 600 ml   Output 200 ml   Net 400 ml      Physical Exam  Constitutional:       General: He is not in acute distress.     Appearance: Normal appearance. He is not ill-appearing, toxic-appearing or diaphoretic.   HENT:      Head: Normocephalic and atraumatic.      Nose: No  congestion or rhinorrhea.   Cardiovascular:      Rate and Rhythm: Normal rate and regular rhythm.      Pulses: Normal pulses.      Heart sounds: No murmur heard.  Pulmonary:      Effort: Pulmonary effort is normal. No respiratory distress.      Breath sounds: Normal breath sounds. No wheezing.   Abdominal:      General: Abdomen is flat. There is no distension.      Palpations: Abdomen is soft.      Tenderness: There is no abdominal tenderness.   Musculoskeletal:         General: Tenderness (B/L mid back) present.   Skin:     General: Skin is warm and dry.   Neurological:      General: No focal deficit present.      Mental Status: He is alert and oriented to person, place, and time.   Psychiatric:         Mood and Affect: Mood normal.         Behavior: Behavior normal.       Significant Labs: All pertinent labs within the past 24 hours have been reviewed.  Recent Lab Results         02/27/23  0327        Anion Gap 11       Baso # 0.05       Basophil % 0.6       BUN 21       Calcium 9.1       Chloride 100       CO2 23       Creatinine 0.8       Differential Method Automated       eGFR >60.0       Eos # 0.3       Eosinophil % 3.4       Glucose 104       Gran # (ANC) 4.7       Gran % 56.9       Hematocrit 39.5       Hemoglobin 12.7       Immature Grans (Abs) 0.01  Comment: Mild elevation in immature granulocytes is non specific and   can be seen in a variety of conditions including stress response,   acute inflammation, trauma and pregnancy. Correlation with other   laboratory and clinical findings is essential.         Immature Granulocytes 0.1       Lymph # 2.5       Lymph % 29.7       Magnesium 1.9       MCH 27.5       MCHC 32.2       MCV 86       Mono # 0.8       Mono % 9.3       MPV 8.7       nRBC 0       Phosphorus 4.6       Platelets 312       Potassium 4.1       RBC 4.62       RDW 13.1       Sodium 134       WBC 8.28               Significant Imaging: I have reviewed all pertinent imaging results/findings within  the past 24 hours.

## 2023-02-27 NOTE — HOSPITAL COURSE
17M presented to the hospital medicine service with suspected pyelonephritis in the setting of recently diagnosed UTI being treated outpatient with bactrim. Had R flank pain. CT did not show and renal fat stranding or stones, but clinical picture c/w pyelonephritis. CT A/P also showed known moderate hydrouretronephrosis and sign of fractured ventriculoperitoneal shunt with distal migration of the fractured part. Urine cultures grew pansensitive Proteus, same organism that grew on initial outpatient urine culture from 2/22. He was transitioned to oral cefpodoxime upon discharge to complete a total of 10 day course of cephalosporins; he will stop the bactrim that was previously prescribed outpatient. Also given short course of tramadol for residual discomfort as infection heals. Referred to outpatient PT/OT to aid with mobility. Advised patient and mom to also consider seeing his orthopedic doctor for causes of back pain that may not be related to UTI but rather his longstanding spinal issues.

## 2023-02-27 NOTE — PROGRESS NOTES
[facility] - [facility] ([place] [address])  [facility] Medicine  Progress Note    Patient Name: Kenji Malone  MRN: [id number]  Patient Class: IP- Inpatient   Admission Date: 2/25/2023  Length of Stay: 1 days  Attending Physician: Gina Swann MD  Primary Care Provider: Hazel Monsalve MD        Subjective:     Principal Problem:Pyelonephritis        HPI:  Mr. Malone is a 17-year-old male with significant past medical history of spina bifida, bilateral hydronephrosis, neurogenic bladder, and vesicourethral reflux presenting to the ED  along with his mother after 3 days of worsening bilateral mid rib/back pain, burning upon urination, and discoloration of urine stream.  Several days ago patient was found to have a UTI of pansensitive Proteus with his only symptom at the time being bilateral mid back pain.  He was sent home on Bactrim which which he has been taking as prescribed.  The pain worsened to the point of discomfort when taking deep breaths, with new onset burning upon urination, and discoloration of urine.  His mother states that it is not unusual for him to have UTIs however it has been quite sometime since his last 1, and this is the worst symptom wise it has ever been. Patient denies recent fevers, chills, sweats, chest pain, shortness of breath, or N/V/D.  He follows up with urology regularly. IN the ED he was given CTX, and a ketorolac injection.       Overview/Hospital Course:  17M presented to the hospital medicine service with pyelonephritis. Urine cultures grew gram negative rods. The patient was started on ceftriaxone. CT abdomen with moderate hydrouretronephrosis and sign of fractured ventriculoperitoneal shunt.       Interval History: The patient is afebrile. He continues to have flank tenderness, more prominent on the left side. He denies chest pain, shortness of breath, or palpitations. The patient has not had a bowel movement since Wednesday. He is on senna and miralax.  An enema and or  suppository will be considered if needed. Otherwise no acute events. The patient is eating his breakfast at the time of examination.     Review of Systems   Constitutional:  Negative for appetite change, chills, diaphoresis and fever.   Respiratory:  Negative for chest tightness and shortness of breath.    Cardiovascular:  Negative for chest pain and leg swelling.   Gastrointestinal:  Positive for constipation. Negative for abdominal distention, abdominal pain, diarrhea, nausea and vomiting.   Genitourinary:  Positive for difficulty urinating (significant uro hx), dysuria and flank pain.   Musculoskeletal:  Positive for back pain and gait problem (baseline).   Neurological:  Negative for dizziness and light-headedness.   Psychiatric/Behavioral:  Negative for agitation, confusion and decreased concentration.    Objective:     Vital Signs (Most Recent):  Temp: 98.1 °F (36.7 °C) (02/27/23 0755)  Pulse: 94 (02/27/23 0755)  Resp: 18 (02/27/23 0919)  BP: (!) 115/56 (02/27/23 0755)  SpO2: 97 % (02/27/23 0755)   Vital Signs (24h Range):  Temp:  [97.6 °F (36.4 °C)-98.2 °F (36.8 °C)] 98.1 °F (36.7 °C)  Pulse:  [75-94] 94  Resp:  [17-18] 18  SpO2:  [93 %-97 %] 97 %  BP: (102-115)/(55-70) 115/56     Weight: 45.4 kg (100 lb 1.4 oz)  Body mass index is 17.73 kg/m².    Intake/Output Summary (Last 24 hours) at 2/27/2023 1131  Last data filed at 2/27/2023 0635  Gross per 24 hour   Intake 600 ml   Output 200 ml   Net 400 ml      Physical Exam  Constitutional:       General: He is not in acute distress.     Appearance: Normal appearance. He is not ill-appearing, toxic-appearing or diaphoretic.   HENT:      Head: Normocephalic and atraumatic.      Nose: No congestion or rhinorrhea.   Cardiovascular:      Rate and Rhythm: Normal rate and regular rhythm.      Pulses: Normal pulses.      Heart sounds: No murmur heard.  Pulmonary:      Effort: Pulmonary effort is normal. No respiratory distress.      Breath sounds: Normal breath sounds. No  wheezing.   Abdominal:      General: Abdomen is flat. There is no distension.      Palpations: Abdomen is soft.      Tenderness: There is no abdominal tenderness.   Musculoskeletal:         General: Tenderness (B/L mid back) present.   Skin:     General: Skin is warm and dry.   Neurological:      General: No focal deficit present.      Mental Status: He is alert and oriented to person, place, and time.   Psychiatric:         Mood and Affect: Mood normal.         Behavior: Behavior normal.       Significant Labs: All pertinent labs within the past 24 hours have been reviewed.  Recent Lab Results         02/27/23  0327        Anion Gap 11       Baso # 0.05       Basophil % 0.6       BUN 21       Calcium 9.1       Chloride 100       CO2 23       Creatinine 0.8       Differential Method Automated       eGFR >60.0       Eos # 0.3       Eosinophil % 3.4       Glucose 104       Gran # (ANC) 4.7       Gran % 56.9       Hematocrit 39.5       Hemoglobin 12.7       Immature Grans (Abs) 0.01  Comment: Mild elevation in immature granulocytes is non specific and   can be seen in a variety of conditions including stress response,   acute inflammation, trauma and pregnancy. Correlation with other   laboratory and clinical findings is essential.         Immature Granulocytes 0.1       Lymph # 2.5       Lymph % 29.7       Magnesium 1.9       MCH 27.5       MCHC 32.2       MCV 86       Mono # 0.8       Mono % 9.3       MPV 8.7       nRBC 0       Phosphorus 4.6       Platelets 312       Potassium 4.1       RBC 4.62       RDW 13.1       Sodium 134       WBC 8.28               Significant Imaging: I have reviewed all pertinent imaging results/findings within the past 24 hours.      Assessment/Plan:      * Pyelonephritis  This is a 17-year-old male with significant past medical history of spina bifida, vesicourethral reflux, and bilateral hydronephrosis presenting to the ED  after recent diagnosis of UTI being treated with Bactrim for  pansensitive Proteus species several days ago  due to worsening bilateral mid back pain, burning upon urination, and alteration in urine color observed by mother   UA positive for gram negative rods, pending susceptibilities    - Continue ceftriaxone        Spina bifida of lumbar region without hydrocephalus  Patient with known hx of spina bifida and associated urological pathology        Vesicoureteral reflux  See bilateral hydronephrosis        (ventriculoperitoneal) shunt status  Stool burden noted on imaging.  Patient reinforces that he is not made stool despite good p.o. intake.      - Continue bowel regimen  - Enema will be ordered if no BM      Bilateral hydronephrosis  Patient complaining of bilateral mid rib back pain.  History of bilateral hydronephrosis, however after discussion with patient and mother pain is new and only began several days ago and generally reports no pain at all at baseline.  Patient on Flomax, oxybutynin and Myrbetriq at home managed by his pediatric urologist    - Resume home Flomax  - Resume home oxybutynin       Neurogenic bladder  See bilateral hydronephrosis         VTE Risk Mitigation (From admission, onward)         Ordered     IP VTE LOW RISK PATIENT  Once         02/26/23 0109     Place sequential compression device  Until discontinued         02/26/23 0109                Discharge Planning   JOSIAH:      Code Status: Full Code   Is the patient medically ready for discharge?: No    Reason for patient still in hospital (select all that apply): Treatment                     David Rodríguez MD  Department of Hospital Medicine   [facility] - Intensive Care ([place]

## 2023-02-27 NOTE — PLAN OF CARE
[facility] - [facility] ([facility])  Discharge Assessment    Primary Care Provider: Hazel Monsalve MD     Discharge Assessment (most recent)       BRIEF DISCHARGE ASSESSMENT - 02/27/23 1037          Discharge Planning    Assessment Type Discharge Planning Brief Assessment (P)      Resource/Environmental Concerns none (P)      Support Systems Family members (P)      Current Living Arrangements home (P)      Patient/Family Anticipates Transition to home (P)      Patient/Family Anticipated Services at Transition none (P)      DME Needed Upon Discharge  none (P)      Discharge Plan A Home with family (P)      Discharge Plan B Home with family (P)

## 2023-02-27 NOTE — MEDICAL/APP STUDENT
Progress Note        Admit Date: 2/25/2023    SUBJECTIVE:     Principle Problem:  Pyelonephritis    HPI: Kenji Malone is a 17 y.o. male with a history of spina bifida, VUR, and recurrent UTIs  who presents with worsening dysuria, difficulty urinating, and abdominal pain. He was discharged from the [facility] ED (2/22) for a UTI. He was given a course of Bactrim and completed it but returned once his symptoms had worsened.     Hospital Course:   17 y.o. man with PMHx of VUR and recurrent UTIs presenting with persistent UTI after completion of Bactrim course. UA was taken and showed cloudy urine with RBCs, WBCs, bacteria, and hyaline casts. Urine cultures taken 2/25 showing GNR with identification and susceptibility pending as of 1100 2/27. CT abdomen with contrast 2/25 showed irregular bladder wall, hydroureteronephrosis, bilateral renal cortical scarring all similar to prior CT but also a large amount of stool burden.    Interval History:     Review of Systems 2/27  Constitutional: Negative for fever or chills.  HENT: Negative for ear pain and sore throat.    Respiratory: Negative for cough or shortness of breath  Cardiovascular: Negative for chest pain, palpitations, or leg swelling.  Gastrointestinal: Negative for nausea, vomiting, abdominal pain. Positive for constipation.  Genitourinary: Negative for hematuria or dysuria. Positive for bilateral flank pain.   Musculoskeletal: Negative for arthralgias or myalgias  Skin: Negative for rash or pruritis  Neurological: Negative for dizziness or tremors    Scheduled Meds:   cefTRIAXone (ROCEPHIN) IVPB  2 g Intravenous Q24H    oxybutynin  15 mg Oral Daily    polyethylene glycol  17 g Oral Daily    senna-docusate 8.6-50 mg  1 tablet Oral Daily    tamsulosin  1 capsule Oral Nightly     Continuous Infusions:  PRN Meds:.dextrose 10%, dextrose 10%, glucagon (human recombinant), glucose, glucose, naloxone, oxyCODONE, sodium chloride 0.9%     OBJECTIVE:     Vital Signs Range  (Last 24H): Afebrile, Not significant  Temp:  [97.5 °F (36.4 °C)-98.2 °F (36.8 °C)]   Pulse:  [75-94]   Resp:  [17-18]   BP: (102-115)/(55-70)   SpO2:  [93 %-97 %]     I & O (Last 24H):  Intake/Output Summary (Last 24 hours) at 2/27/2023 1054  Last data filed at 2/27/2023 0635  Gross per 24 hour   Intake 600 ml   Output 200 ml   Net 400 ml       Physical Exam:  General: Alert and oriented x 3, no distress   HEENT: Conjunctivae/corneas clear, PERRL, oropharynx clear, mucous membranes are moist.  Pulmonary: Normal respiratory effort, normal breath sounds, no wheezing or rales.   Cardiovascular: Normal rate, regular rhythm, no murmur, pulses 2+ and symmetric.  Abdominal: Soft, with bilateral flank pain and hyperactive bowel sounds. No rebound guarding, distension, masses, or organomegaly.   Musculoskeletal: No swelling or tenderness.   Skin: Warm. Color, texture, turgor normal. No rashes or lesions. Cap refill <2 seconds.   Neuro: CN II-XII grossly intact, no focal numbness or weakness, normal strength and tone  Psychiatric: Normal mood and affect.      Laboratory Results:  Not significant except when noting that BUN is still elevated but trending downwards.   Hb saw a minor decrease overnight from 13.7 to 12.7.  Sodium 135 but trending upward.   Phosphorus 4.6.       Diagnostic Results:  Recent imaging reviewed    Microbiology:  Recent cultured reviewed - Id and susceptibility pending.     ASSESSMENT/PLAN:     Patient is a 17 year old man with past medical history significant for recurrent utis and vur presenting for persistent uti after a completed bactrim course.     - UTI: Currently managed with ceftriaxone 2g in D5W every 24 hours for GNR identified in urine culture. Will re evaluate pending id and susceptibility.     - Constipation: Unchanged. Patient is taking Polyethylene glycol 17g, has been given once daily. Patient has not been taking senna but has been encouraged to take it.     - Neurogenic bladder:  Unchanged. Patient is continuing home medications of oxybutynin 15mg 24hr tab and tamulosin 0.4mg 24hr tab        Discharge planning:   Is the patient medically ready for discharge?: No   Reason for patient still in hospital (select all that apply): Patient awaiting urine culture for home treatment recommendations  Dispo: Home

## 2023-02-27 NOTE — ASSESSMENT & PLAN NOTE
Stool burden noted on imaging.  Patient reinforces that he is not made stool despite good p.o. intake.      - Continue bowel regimen  - Enema will be ordered if no BM

## 2023-02-28 VITALS
SYSTOLIC BLOOD PRESSURE: 96 MMHG | HEIGHT: 63 IN | RESPIRATION RATE: 15 BRPM | TEMPERATURE: 98 F | BODY MASS INDEX: 17.73 KG/M2 | OXYGEN SATURATION: 98 % | DIASTOLIC BLOOD PRESSURE: 60 MMHG | HEART RATE: 76 BPM | WEIGHT: 100.06 LBS

## 2023-02-28 LAB
ANION GAP SERPL CALC-SCNC: 7 MMOL/L (ref 8–16)
BASOPHILS # BLD AUTO: 0.04 K/UL (ref 0–0.2)
BASOPHILS NFR BLD: 0.5 % (ref 0–1.9)
BUN SERPL-MCNC: 12 MG/DL (ref 6–20)
CALCIUM SERPL-MCNC: 9.3 MG/DL (ref 8.7–10.5)
CHLORIDE SERPL-SCNC: 104 MMOL/L (ref 95–110)
CO2 SERPL-SCNC: 25 MMOL/L (ref 23–29)
CREAT SERPL-MCNC: 0.7 MG/DL (ref 0.5–1.4)
DIFFERENTIAL METHOD: ABNORMAL
EOSINOPHIL # BLD AUTO: 0.3 K/UL (ref 0–0.5)
EOSINOPHIL NFR BLD: 3.6 % (ref 0–8)
ERYTHROCYTE [DISTWIDTH] IN BLOOD BY AUTOMATED COUNT: 12.9 % (ref 11.5–14.5)
EST. GFR  (NO RACE VARIABLE): >60 ML/MIN/1.73 M^2
GLUCOSE SERPL-MCNC: 93 MG/DL (ref 70–110)
HCT VFR BLD AUTO: 39.3 % (ref 40–54)
HGB BLD-MCNC: 12.7 G/DL (ref 14–18)
IMM GRANULOCYTES # BLD AUTO: 0.02 K/UL (ref 0–0.04)
IMM GRANULOCYTES NFR BLD AUTO: 0.2 % (ref 0–0.5)
LYMPHOCYTES # BLD AUTO: 2.8 K/UL (ref 1–4.8)
LYMPHOCYTES NFR BLD: 33.6 % (ref 18–48)
MAGNESIUM SERPL-MCNC: 1.9 MG/DL (ref 1.6–2.6)
MCH RBC QN AUTO: 28 PG (ref 27–31)
MCHC RBC AUTO-ENTMCNC: 32.3 G/DL (ref 32–36)
MCV RBC AUTO: 87 FL (ref 82–98)
MONOCYTES # BLD AUTO: 0.7 K/UL (ref 0.3–1)
MONOCYTES NFR BLD: 8.2 % (ref 4–15)
NEUTROPHILS # BLD AUTO: 4.6 K/UL (ref 1.8–7.7)
NEUTROPHILS NFR BLD: 53.9 % (ref 38–73)
NRBC BLD-RTO: 0 /100 WBC
PHOSPHATE SERPL-MCNC: 4.6 MG/DL (ref 2.7–4.5)
PLATELET # BLD AUTO: 292 K/UL (ref 150–450)
PMV BLD AUTO: 8.7 FL (ref 9.2–12.9)
POTASSIUM SERPL-SCNC: 4.2 MMOL/L (ref 3.5–5.1)
RBC # BLD AUTO: 4.53 M/UL (ref 4.6–6.2)
SODIUM SERPL-SCNC: 136 MMOL/L (ref 136–145)
WBC # BLD AUTO: 8.44 K/UL (ref 3.9–12.7)

## 2023-02-28 PROCEDURE — 25000003 PHARM REV CODE 250

## 2023-02-28 PROCEDURE — 83735 ASSAY OF MAGNESIUM: CPT

## 2023-02-28 PROCEDURE — 85025 COMPLETE CBC W/AUTO DIFF WBC: CPT

## 2023-02-28 PROCEDURE — 99238 HOSP IP/OBS DSCHRG MGMT 30/<: CPT | Mod: ,,, | Performed by: HOSPITALIST

## 2023-02-28 PROCEDURE — 36415 COLL VENOUS BLD VENIPUNCTURE: CPT

## 2023-02-28 PROCEDURE — 99238 PR HOSPITAL DISCHARGE DAY,<30 MIN: ICD-10-PCS | Mod: ,,, | Performed by: HOSPITALIST

## 2023-02-28 PROCEDURE — 84100 ASSAY OF PHOSPHORUS: CPT

## 2023-02-28 PROCEDURE — 80048 BASIC METABOLIC PNL TOTAL CA: CPT

## 2023-02-28 RX ORDER — TRAMADOL HYDROCHLORIDE 50 MG/1
50 TABLET ORAL EVERY 8 HOURS PRN
Qty: 15 TABLET | Refills: 0 | Status: SHIPPED | OUTPATIENT
Start: 2023-02-28 | End: 2023-03-05

## 2023-02-28 RX ORDER — CEFPODOXIME PROXETIL 100 MG/1
200 TABLET, FILM COATED ORAL EVERY 12 HOURS
Qty: 28 TABLET | Refills: 0 | Status: SHIPPED | OUTPATIENT
Start: 2023-02-28 | End: 2023-03-07

## 2023-02-28 RX ADMIN — SENNOSIDES AND DOCUSATE SODIUM 1 TABLET: 50; 8.6 TABLET ORAL at 09:02

## 2023-02-28 RX ADMIN — POLYETHYLENE GLYCOL 3350 17 G: 17 POWDER, FOR SOLUTION ORAL at 09:02

## 2023-02-28 RX ADMIN — OXYBUTYNIN CHLORIDE 15 MG: 10 TABLET, EXTENDED RELEASE ORAL at 09:02

## 2023-02-28 RX ADMIN — OXYCODONE HYDROCHLORIDE 5 MG: 5 TABLET ORAL at 09:02

## 2023-02-28 NOTE — PLAN OF CARE
Pt is AAOx4,VS WNL on room air. Pt with orders to d/c IV and d/c home. Tolerated d/c of Iv. Awake ,alert, and oriented with no acute distress noted . Reviewed discharge orders including : medicine orders, prescriptions, followup appts, and patient education materials for diet and diagnosis. Belongings packed for transport  to home. Ambulating out at discharge per wheelchair with mom

## 2023-02-28 NOTE — DISCHARGE INSTRUCTIONS
Finish your entire course of antibiotics even if you start to feel better.   Stay hydrated very well to avoid worsening constipation.

## 2023-02-28 NOTE — PLAN OF CARE
[facility] - Int[facility] ([place] [address])      HOME HEALTH ORDERS  FACE TO FACE ENCOUNTER    Patient Name: Kenji Malone  YOB: 2005    PCP: Hazel Monsalve MD   PCP Address: [facility] / [place] [address]  PCP Phone Number: [phone]  PCP Fax: [phone]    Encounter Date: 2/25/23    Admit to Home Health    Diagnoses:  Active Hospital Problems    Diagnosis  POA    *Pyelonephritis [N12]  Yes    Spina bifida of lumbar region without hydrocephalus [Q05.7]  Not Applicable     hydrocephalus resolved,  shunt not working      Vesicoureteral reflux [N13.70]  Yes    Bilateral hydronephrosis [N13.30]  Yes    Neurogenic bladder [N31.9]  Yes     (ventriculoperitoneal) shunt status [Z98.2]  Yes      Resolved Hospital Problems   No resolved problems to display.       Follow Up Appointments:  No future appointments.    Allergies:  Review of patient's allergies indicates:   Allergen Reactions    Latex, natural rubber        Medications: Review discharge medications with patient and family and provide education.    Current Facility-Administered Medications   Medication Dose Route Frequency Provider Last Rate Last Admin    cefTRIAXone (ROCEPHIN) 2 g in dextrose 5 % in water (D5W) 5 % 50 mL IVPB (MB+)  2 g Intravenous Q24H [facility],    Stopped at 02/27/23 2214    dextrose 10% bolus 125 mL 125 mL  12.5 g Intravenous PRN [facility], DO        dextrose 10% bolus 250 mL 250 mL  25 g Intravenous PRN [facility], DO        glucagon (human recombinant) injection 1 mg  1 mg Intramuscular PRN [facility], DO        glucose chewable tablet 16 g  16 g Oral PRN [facility], DO        glucose chewable tablet 24 g  24 g Oral PRN [facility], DO        naloxone 0.4 mg/mL injection 0.02 mg  0.02 mg Intravenous PRN [place], DO        oxybutynin 24 hr tablet 15 mg  15 mg Oral Daily [facility], DO   15 mg at 02/28/23 0912    oxyCODONE immediate release tablet 5 mg  5 mg  Oral Q6H PRN Jude Rosas DO   5 mg at 02/28/23 0912    polyethylene glycol packet 17 g  17 g Oral Daily Jude Rosas DO   17 g at 02/28/23 0912    senna-docusate 8.6-50 mg per tablet 1 tablet  1 tablet Oral Daily Jude Rosas DO   1 tablet at 02/28/23 0912    sodium chloride 0.9% flush 10 mL  10 mL Intravenous Q12H PRN Jude Rosas, DO        tamsulosin 24 hr capsule 0.4 mg  1 capsule Oral Nightly Jude Rosas DO   0.4 mg at 02/27/23 2142     Current Discharge Medication List        START taking these medications    Details   cefpodoxime (VANTIN) 100 MG tablet Take 2 tablets (200 mg total) by mouth every 12 (twelve) hours. for 7 days  Qty: 28 tablet, Refills: 0           CONTINUE these medications which have NOT CHANGED    Details   MYRBETRIQ 50 mg Tb24 Take 1 tablet by mouth nightly.      oxybutynin (DITROPAN XL) 15 MG TR24 Take 15 mg by mouth every morning.      tamsulosin (FLOMAX) 0.4 mg Cap Take 1 capsule by mouth nightly.               I have seen and examined this patient within the last 30 days. My clinical findings that support the need for the home health skilled services and home bound status are the following:no   Weakness/numbness causing balance and gait disturbance due to Weakness/Debility making it taxing to leave home.     Diet:   regular diet    Labs:  No labs    Referrals/ Consults  Physical Therapy to evaluate and treat. Evaluate for home safety and equipment needs; Establish/upgrade home exercise program. Perform / instruct on therapeutic exercises, gait training, transfer training, and Range of Motion.  Occupational Therapy to evaluate and treat. Evaluate home environment for safety and equipment needs. Perform/Instruct on transfers, ADL training, ROM, and therapeutic exercises.    Activities:   activity as tolerated and no driving for today    Nursing:   Agency to admit patient within 24 hours of hospital discharge unless specified on physician order or at patient  request    SN to complete comprehensive assessment including routine vital signs. Instruct on disease process and s/s of complications to report to MD. Review/verify medication list sent home with the patient at time of discharge  and instruct patient/caregiver as needed. Frequency may be adjusted depending on start of care date.     Skilled nurse to perform up to 3 visits PRN for symptoms related to diagnosis    Notify MD if SBP > 160 or < 90; DBP > 90 or < 50; HR > 120 or < 50; Temp > 101; O2 < 88%; Other:       Ok to schedule additional visits based on staff availability and patient request on consecutive days within the home health episode.    When multiple disciplines ordered:    Start of Care occurs on Sunday - Wednesday schedule remaining discipline evaluations as ordered on separate consecutive days following the start of care.    Thursday SOC -schedule subsequent evaluations Friday and Monday the following week.     Friday - Saturday SOC - schedule subsequent discipline evaluations on consecutive days starting Monday of the following week.    For all post-discharge communication and subsequent orders please contact patient's primary care physician. If unable to reach primary care physician or do not receive response within 30 minutes, please contact  for clinical staff order clarification    Miscellaneous   none    Home Health Aide:  Physical Therapy Three times weekly and Occupational Therapy Three times weekly    Wound Care Orders  no    I certify that this patient is confined to his home and needs physical therapy and occupational therapy.

## 2023-02-28 NOTE — DISCHARGE SUMMARY
[facility] - Intensive Care ([place] [address])  [facility] Medicine  Discharge Summary      Patient Name: Kenji Malone  MRN: [id number]  [facility]: [id number]  Patient Class: IP- Inpatient  Admission Date: 2/25/2023  Hospital Length of Stay: 2 days  Discharge Date and Time: No discharge date for patient encounter.  Attending Physician: Gina Swann MD   Discharging Provider: Melanie Delgado MD  Primary Care Provider: Hazel Monsalve MD  [facility] Medicine Team: [facility] 2 Melanie Delgado MD  Primary Care Team: [facility] 2    HPI:   Mr. Malone is a 17-year-old male with significant past medical history of spina bifida, bilateral hydronephrosis, neurogenic bladder, and vesicourethral reflux presenting to the ED  along with his mother after 3 days of worsening bilateral mid rib/back pain, burning upon urination, and discoloration of urine stream.  Several days ago patient was found to have a UTI of pansensitive Proteus with his only symptom at the time being bilateral mid back pain.  He was sent home on Bactrim which which he has been taking as prescribed.  The pain worsened to the point of discomfort when taking deep breaths, with new onset burning upon urination, and discoloration of urine.  His mother states that it is not unusual for him to have UTIs however it has been quite sometime since his last 1, and this is the worst symptom wise it has ever been. Patient denies recent fevers, chills, sweats, chest pain, shortness of breath, or N/V/D.  He follows up with urology regularly. IN the ED he was given CTX, and a ketorolac injection.       * No surgery found *      Hospital Course:   17M presented to the hospital medicine service with suspected pyelonephritis in the setting of recently diagnosed UTI being treated outpatient with bactrim. Had R flank pain. CT did not show and renal fat stranding or stones, but clinical picture c/w pyelonephritis. CT A/P also showed known moderate hydrouretronephrosis and sign  of fractured ventriculoperitoneal shunt with distal migration of the fractured part. Urine cultures grew pansensitive Proteus, same organism that grew on initial outpatient urine culture from 2/22. He was transitioned to oral cefpodoxime upon discharge to complete a total of 10 day course of cephalosporins; he will stop the bactrim that was previously prescribed outpatient. Also given short course of tramadol for residual discomfort as infection heals. Referred to outpatient PT/OT to aid with mobility. Advised patient and mom to also consider seeing his orthopedic doctor for causes of back pain that may not be related to UTI but rather his longstanding spinal issues.        Goals of Care Treatment Preferences:  Code Status: Full Code      Consults:     Neuro  Spina bifida of lumbar region without hydrocephalus  Patient with known hx of spina bifida and associated urological pathology         (ventriculoperitoneal) shunt status  Stool burden noted on imaging.  Patient reinforces that he is not made stool despite good p.o. intake.      - Continue bowel regimen  - Enema will be ordered if no BM      Renal/  Vesicoureteral reflux  See bilateral hydronephrosis       Bilateral hydronephrosis  Patient complaining of bilateral mid rib back pain.  History of bilateral hydronephrosis, however after discussion with patient and mother pain is new and only began several days ago and generally reports no pain at all at baseline.  Patient on Flomax, oxybutynin and Myrbetriq at home managed by his pediatric urologist    - Resume home Flomax  - Resume home oxybutynin       Neurogenic bladder  See bilateral hydronephrosis       ID  * Pyelonephritis  This is a 17-year-old male with significant past medical history of spina bifida, vesicourethral reflux, and bilateral hydronephrosis presenting to the ED  after recent diagnosis of UTI being treated with Bactrim for pansensitive Proteus species several days ago  due to worsening  bilateral mid back pain, burning upon urination, and alteration in urine color observed by mother   UA positive for gram negative rods, pending susceptibilities    - Continue ceftriaxone          Final Active Diagnoses:    Diagnosis Date Noted POA    PRINCIPAL PROBLEM:  Pyelonephritis [N12] 02/26/2023 Yes    Spina bifida of lumbar region without hydrocephalus [Q05.7] 02/22/2023 Not Applicable    Vesicoureteral reflux [N13.70] 02/22/2023 Yes    Bilateral hydronephrosis [N13.30] 06/01/2022 Yes    Neurogenic bladder [N31.9] 06/01/2022 Yes     (ventriculoperitoneal) shunt status [Z98.2] 11/15/2017 Yes      Problems Resolved During this Admission:       Discharged Condition: fair    Disposition: Home or Self Care    Follow Up:   Follow-up Information     Chi Escalante MD. Schedule an appointment as soon as possible for a visit in 1 month(s).    Specialty: Pediatric Urology  Contact information:  [address] [facility] [address]  [phone]             Hazel Monsalve MD. Schedule an appointment as soon as possible for a visit in 1 month(s).    Specialty: Family Medicine  Contact information:  [address]  [address]  [place] [id number] [phone]             [facility] - Neurosurgery [facility]. Schedule an appointment as soon as possible for a visit in 1 week(s).    Specialty: Neurosurgery  Why: to be evaluated for your  shunt which the CT scan showed is broken off at the end  Contact information:  [address]  [facility] [address] [phone]  Additional information:  8th Floor [facility]   Please park in [facility].   Check in desk is located in the [facility]. Please take the C elevator to 8th floor which opens to the [facility].                     Patient Instructions:      Ambulatory referral/consult to Neurosurgery   Standing Status: Future   Referral Priority: Routine Referral Type: Consultation   Referral Reason: Specialty Services Required   Requested Specialty: Neurosurgery    Number of Visits Requested: 1     Ambulatory referral/consult to Physical/Occupational Therapy   Standing Status: Future   Referral Priority: Routine Referral Type: Physical Medicine   Referral Reason: Specialty Services Required   Number of Visits Requested: 1       Significant Diagnostic Studies: Labs:   BMP:   Recent Labs   Lab 02/27/23  0327 02/28/23  0501    93   * 136   K 4.1 4.2    104   CO2 23 25   BUN 21* 12   CREATININE 0.8 0.7   CALCIUM 9.1 9.3   MG 1.9 1.9    and CBC   Recent Labs   Lab 02/27/23  0327 02/28/23  0501   WBC 8.28 8.44   HGB 12.7* 12.7*   HCT 39.5* 39.3*    292     Microbiology:   Urine Culture    Lab Results   Component Value Date    LABURIN PROTEUS MIRABILIS  10,000 - 49,999 cfu/ml   (A) 02/25/2023       Pending Diagnostic Studies:     None         Medications:  Reconciled Home Medications:      Medication List      START taking these medications    cefpodoxime 100 MG tablet  Commonly known as: VANTIN  Take 2 tablets (200 mg total) by mouth every 12 (twelve) hours. for 7 days     traMADoL 50 mg tablet  Commonly known as: ULTRAM  Take 1 tablet (50 mg total) by mouth every 8 (eight) hours as needed for Pain.        CONTINUE taking these medications    MYRBETRIQ 50 mg Tb24  Generic drug: mirabegron  Take 1 tablet by mouth nightly.     oxybutynin 15 MG Tr24  Commonly known as: DITROPAN XL  Take 15 mg by mouth every morning.     tamsulosin 0.4 mg Cap  Commonly known as: FLOMAX  Take 1 capsule by mouth nightly.            Indwelling Lines/Drains at time of discharge:   Lines/Drains/Airways     None                 Time spent on the discharge of patient: 31 minutes         Melanie Delgado MD  Department of Hospital Medicine  [facility] - Intensive Care ([place]-16)

## 2023-05-29 PROBLEM — N12 PYELONEPHRITIS: Status: RESOLVED | Noted: 2023-02-26 | Resolved: 2023-05-29

## 2023-05-31 ENCOUNTER — DOCUMENTATION ONLY (OUTPATIENT)
Dept: REHABILITATION | Facility: HOSPITAL | Age: 18
End: 2023-05-31

## 2023-05-31 NOTE — PROGRESS NOTES
Documentation Only/ No Show    Patient: Kenji Malone  Date of Session: 05/31/2023  MRN: [id number]  Kenji Malone did not attend his scheduled therapy appointment today. Kenji Malone did not call to cancel nor reschedule his evaluation. This is the 1st appointment that the patient has not attended. No charges have been posted today.     Laly Fagan, PT  05/31/2023

## 2023-08-29 ENCOUNTER — CLINICAL SUPPORT (OUTPATIENT)
Dept: REHABILITATION | Facility: HOSPITAL | Age: 18
End: 2023-08-29
Attending: STUDENT IN AN ORGANIZED HEALTH CARE EDUCATION/TRAINING PROGRAM
Payer: MEDICAID

## 2023-08-29 DIAGNOSIS — M25.662 DECREASED RANGE OF MOTION OF BOTH KNEES: ICD-10-CM

## 2023-08-29 DIAGNOSIS — M25.661 DECREASED RANGE OF MOTION OF BOTH KNEES: ICD-10-CM

## 2023-08-29 DIAGNOSIS — R47.1 DYSARTHRIA: ICD-10-CM

## 2023-08-29 DIAGNOSIS — G12.9 SPINAL MUSCULAR ATROPHY: Primary | ICD-10-CM

## 2023-08-29 DIAGNOSIS — Q05.7 SPINA BIFIDA OF LUMBAR REGION WITHOUT HYDROCEPHALUS: ICD-10-CM

## 2023-08-29 DIAGNOSIS — R26.9 GAIT ABNORMALITY: Primary | ICD-10-CM

## 2023-08-29 DIAGNOSIS — Q05.9 MYELOMENINGOCELE: ICD-10-CM

## 2023-08-29 PROCEDURE — 92522 EVALUATE SPEECH PRODUCTION: CPT

## 2023-08-29 PROCEDURE — 97161 PT EVAL LOW COMPLEX 20 MIN: CPT | Mod: PO

## 2023-08-29 NOTE — PLAN OF CARE
"[facility]  Speech Therapy Evaluation - Motor Speech Sound    Date: 8/29/2023     Name: Kenji Malone   MRN: [id number]    Therapy Diagnosis:    Encounter Diagnoses   Name Primary?    Spinal muscular atrophy Yes    Dysarthria      Physician: Melanie Delgado  Physician Orders: Ambulatory Referral to Speech Therapy   Medical Diagnosis: Spinal muscular atrophy [G12.9]    Visit # / Visits Authorized:  1 / 1   Date of Evaluation:  8/29/2023   Insurance Authorization Period: 8/29/2023 to 12/31/23  Plan of Care Certification:    8/29/2023 to 10/17/2023      Time In:1:45PM   Time Out: 2:30PM   Total time: 45 minutes    Procedure   Evaluation of Speech Sound Production     Precautions: Standard and Fall  Subjective   Date of Onset: "all his life", patient has spina bifida  History of Current Condition:  Kenji Malone is a 18 y.o. male who presents to [facility] Outpatient Speech Therapy for evaluation secondary to Spinal muscular atrophy [G12.9]. Patient was referred to therapy by Melanie Delgado MD which is the patient's physician. Patient reports that he has problems "using his mouth and tongue to create clear speech".  Respiration in speech becomes limited when speaking longer utterances. Patient's mom noted that speech sound disorders run in the family. Patient was accompanied to the evaluation by his mother.   Past Medical History: Kenji Malone  has a past medical history of Bilateral hydronephrosis (6/1/2022), Neurogenic bladder (6/1/2022), and Spina bifida of lumbar region without hydrocephalus (2/22/2023).  Kenji Malone  has no past surgical history on file.  Medical Hx and Allergies: Kenji has a current medication list which includes the following prescription(s): myrbetriq, oxybutynin, and tamsulosin.   Review of patient's allergies indicates:   Allergen Reactions    Latex, natural rubber      Prior Therapy:  Speech therapy since 1 years old and at [facility] within the past " year  Social History:  Patient lives with mom; Patient is not currently driving.  Patient interacts socially at [employer], but typically watches tv for fun.  Occupation:  Unemployed   Prior Level of Function: Current baseline    Current Level of Function: Impaired   Pain Scale: no pain indicated throughout session  Patient's Therapy Goals:  to speak clearer  Objective   Formal Assessment:  Oral Motor Exam:      Cranial Nerve 5: Trigeminal Nerve  Motor Jaw Posture at rest: Closed  Mandible Elevation/Depression: WFL  Mandible lateralization: WFL  Abnormal movement: absent Interpretation: within functional limits   Sensory Forehead: WFL  Cheek: WFL  Jaw: WFL  Facial Pain: None noted Interpretation: within functional limits     Cranial Nerve 7: Facial Nerve  Motor Facial Symmetry:  asymmetry  Wrinkle Forehead: WFL  Close eyes tightly: WFL  Labial Protrusion: Decreased strength  Labial Retraction: WFL  Labial alternating movement: WFL  Cheek puffing sustained: Decreased strength  Cheek puffing against resistance: Decreased strength  Abnormal movement: absent Interpretation:   Indication of potential cranial nerve VII involvement       Sensory Formal testing not completed.       Cranial Nerves IX and X: Glossopharyngeal and Vagus Nerves  Motor Palatal Symmetry (Rest): WNL  Palatal Symmetry: (Movement) WNL  Palatal Elevation (Movement): WNL   Palatal Elevation (Sustained): WNL  Cough: Perceptually weak  Resonance: Hypernasal  Abnormal movement: absent Interpretation: Within functional limits     Cranial Nerve XII: Hypoglossal Nerve  Motor Tongue at rest: WNL  Lingual Protrusion: minimal protrusion past lips  Lingual Protrusion against Resistance: Weakness  Lingual Lateralization: Decreased ROM  Abnormal movement: present Interpretation: Indication of potential cranial nerve XII involvement       Other information:  Volitional Cough: moderately weak  Mucosal Quality: No abnormal findings  Secretion Management: within normal  limits  Dentition: Good condition for speech and mastication  Structure Abnormalities: No       Respiration / Phonation:   # of reps/ 10s Norms/ # reps per second Maximum Phon. Time (ah)   P^ 18  5.0-7.1 1.8  Trial 1: 10   T^ 33  4.8-7.1 3.3  Trial 2: 15    K^ 24  4.4-7.4 2.4  Trial 3: 10   P^T^K^ 10  3.6-7.5 1  Av.7             Norm (F 10-26, M 13-34.6)     Speech Therapist notes patient have reduced intelligibility due to decreased artuiculatory contact during DDK production. The patients DDK results indicate reduced respiration and phonation. Speech Therapist notes that due to patient's intellectual abilities, words produced per minute were not calculated during passage reading.     Phonation Conversation   Stimulus Sustained ah:   History and Conversation   Quality hypernasal, dysarthria Hypernasal, dysarthria   Duration  11.7 seconds  N/a   Loudness  variable Loudness variable Loudness   Steadiness WFL WFL     Overall Speech Intelligibility: Patient overall speech intelligibility is moderately good to fair.     The [place] Dysarthria Assessment-2nd Edition was administered to Kenji Malone  to assess his motor speech skills. This test assesses the patient's reflexes, respiration, lips, palate, laryngeal function, tongue, and overall intelligibility. Results are described in the following tables:    Reflexes Respiration Lips Palate Laryngeal Tongue Intelligibility   Normal for Age X   X      Mild Abnormality     X     Abnormality obvious but can perform task with reasonable approximation  X X   X X   Some production of task poor in quality, unable to sustain, or extremely labored          Unable to undertake task/ movement/ sound            Description: Patient presents with ataxic dysarthria characterized by distinguishing speech traits: excess and equal stress, slow and irregular AMRs, distorted vowels, excess loudness variation, telescoping syllables, and inconsistent articulatory errors.      Awareness / Strategy Use:   Patient  demonstrates adequate awareness of motor speech impairment. Strategies introduced included: Motor Speech Strategies:  Speak Slowly / Pacing: Try to slow your rate of speech when talking while keeping the same intonation or sing-song quality that is natural to your voice.   Overarticulation: Over-say all of the sounds in your words.  Speak Loudly: make sure to project your voice so that others can hear you.  Deep Breath: Make sure to use deep breathing to support your speech. If you do not have enough breath support, it is difficult to over-articulate, speak loudly, or speak slowly.   Open Your Mouth: Make sure to open your mouth widely while speaking.     Impact of Motor Speech Impairment on Functioning:   Patient's dysarthria negatively impacts general tasks and demands, household tasks, interpersonal interactions, education, employment, and community interactions.  Safety Risks: Reduced intelligibility impacts ability to communicate health and safety concerns.    Auditory Comprehension:  No concerns reported or observed; WFL for the purpose of assessment and conversation.     Verbal Expression:  No concerns reported or observed; WFL for the purpose of assessment and conversation.      Reading Comprehension: Did not formally assess, however patient reported he has difficulty with  reading when attempting to read oral passage for voice assessment.       Written Expression: Did not assess. No concerns reported or observed.        Cognition:  No formal concerns reported, however demonstrated to be a poor historian during case history.  Patient alert and cooperative throughout evaluation, was oriented x 4 independently. Patient did not require cues to attend to evaluation tasks throughout session. Patient demonstrated moderate topic maintenance and reasoning skills throughout evaluation, in areas outside case history. Cognitive-linguistic skills WFL for the purpose of assessment  and conversation.        Treatment   Total Treatment Time Separate from Evaluation: 10 minutes   Treatment included the following:  Patient participated in education, instruction, and implementation of diaphragmatic breathing exercises. Patient completed 15x diaphragmatic breaths to increase respiratory strength to improve overall speech intelligibility.  Education provided:   -role of Speech Therapy, goals/plan of care, scheduling/cancellations, insurance limitations with patient  -Additional Education provided:   Motor speech strategies  Breathing exercises  Clear speech strategies    Patient and family members expressed understanding.     Home Program: Patient instructed to complete 3 sets of 10 diaphragmatic breaths a day until 1st Speech Therapy appointment.  Assessment     Kenji presents to [facility] status post medical diagnosis of Spinal muscular atrophy [G12.9].     Interpretation of objective assessment:   He presents with ataxic dysarthria characterized by the distinguishing speech traits of excess and equal stress, slow and irregular AMRs, distorted vowels, excess loudness variation, telescoping syllables, and inconsistent articulatory errors.   Demonstrates impairments including limitations as described in the problem list.     Positive prognostic factors: Family support  Negative prognostic factors: none  Barriers to therapy: No barriers to therapy identified    Patient's spiritual, cultural, and educational needs considered and patient agreeable to plan of care and goals.    Patient will benefit from skilled therapy.    Rehab Potential: good    Short Term Goals: (4 weeks) Current Progress:   1. Patient will recall 4/4 clear speech strategies independently.     Progressing/ Not Met 8/29/2023   Established this date   2. Patient will use motor speech strategies when producing functional phrases (ex: food order) with 90% intelligibility to encourage carryover of motor speech  strategies.     Progressing/ Not Met 8/29/2023   Established this date   3.  Patient will complete diaphragmatic breathing exercises by producing maximal exhalation via diaphragmic breathing with a duration of 5 seconds in 10 trials, consistently with minimal tactile and verbal clinician cues.     Progressing/ Not Met 8/29/2023   Established this date    4.  In order to promote generalization of intelligibility, patient will produce sentences during diaphragmatic breathing while incorporating intelligibility strategies (overarticulation, increased loudness) at 80% accuracy with moderate verbal and visual cues.     Progressing/ Not Met 8/29/2023   Established this date    5. Patient will implement increased intonation, overarticulation, and reduced rate strategies to increase overall intelligibility for functional communication with 80% accuracy and minimal verbal cues for use of strategies.     Progressing/ Not Met 8/29/2023   Established this date    6. Patient will use motor speech strategies and answer questions in conversation with a self-rating of at least 3 on a 3 point scale ( 1 = same as before beginning therapy, 2 =better but not best, 3 =best possible outcome) on  9 /10 trials.       Progressing/ Not Met 8/29/2023   Established this date        Long Term Goals: (6 weeks) Current Progress:   He  will develop functional and intelligible speech and utilize compensatory strategies through the use of adequate labial and lingual function, increased articulatory precision and speech prosody.   Established this date     He will develop use of speech strategies in various environments with familiar and unfamiliar listeners to increase intelligibility.       Established this date         Plan     Recommended Treatment Plan:  Patient will participate in the [facility] rehabilitation program for speech therapy 1 times per week for 6 weeks to address his Motor Speech deficits, to educate patient and their family, and to  participate in a home exercise program.    Other Recommendations:   None at this time.    Therapist's Name:   Kinjal Bravo CF-SLP  Speech-Language Pathologist    8/29/2023

## 2023-08-29 NOTE — PLAN OF CARE
[facility] OUTPATIENT THERAPY AND WELLNESS  Physical Therapy Neurological Rehabilitation Initial Evaluation     Name: Kenji Malone  Clinic Number: [id number]    Therapy Diagnosis:   Encounter Diagnoses   Name Primary?    Myelomeningocele     Spina bifida of lumbar region without hydrocephalus     Decreased range of motion of both knees     Gait abnormality Yes     Physician: Melanie Delgado MD    Physician Orders: PT Eval and Treat   Medical Diagnosis from Referral: Spinal Muscular Atrophy  Evaluation Date: 8/29/2023  Authorization Period Expiration: 12/31/23  Plan of Care Expiration: 10/24/23  Progress Note Due: 9/29/23  Visit # / Visits authorized: 1/ 1  FOTO: 1/3    Precautions: Standard and Fall    Time In: 1310( pt arrives 10 minutes late  Time Out: 1345  Total Billable Time: 35 minutes    Subjective      Date of onset: birth(2005)    History of current condition - Kenji reports: via his mother Rosey: he had ligament surgery on his feet and ankles last Thanksgiving(2022). He had ligament surgeries to his knees in 2018. His AFOs were updated in spring of 2023. Despite this, she feels that pt is having more trouble standing up straight and walking. Pt himself endorses that he can't stand up for long periods of time.     Imaging, See EMR for various entries:     Prior Therapy: Pediatric therapy at [facility] and 21 visits in Neuro PT from 1/22-3/22  Social History: lives with his family~ has an older brother  Falls: no falls in the past 6 months   DME: none    Home Environment: lives in one story home in [place]     Exercise Routine / History: goes to gym and works on stepper, leg press at [employer]  Family Present at time of Eval: mom, Rosey   Occupation: works at [employer] as an [employer]  Prior Level of Function: independent but does not drive  Current Level of Function: independent but having more difficulty standing up and walking long periods    Pain:  Current 0/10, worst 0/10, best 0/10  "  Location: N/A     Patient's goals: " stretch my knees out, stop crouching and keep my balance."    Medical History:   Past Medical History:   Diagnosis Date    Bilateral hydronephrosis 6/1/2022    Neurogenic bladder 6/1/2022    Spina bifida of lumbar region without hydrocephalus 2/22/2023    hydrocephalus resolved,  shunt not working       Surgical History:   Kenji Malone  has no past surgical history on file.    Medications:   Kenji has a current medication list which includes the following prescription(s): myrbetriq, oxybutynin, and tamsulosin.    Allergies:   Review of patient's allergies indicates:   Allergen Reactions    Latex, natural rubber         Objective     Observation: pleasant and cooperative   Speech: understandable, with perhaps mild dysarthria    Mental status: alert, oriented to person, place, and time( needs cues to answer correct date, initially responds " 20th")  Appearance: Casually dressed and Well groomed  Behavior:  calm, cooperative, and adequate rapport can be established  Attention Span and Concentration:  Normal    Posture Alignment :slouched posture. Pt sits on mat with weight shift to L hip, rounded shoulders and forward head. Pt stands with flexion of hips/ knees with adduction of hips and pronation of feet.    Dominant hand:  right     Skin integrity:  Intact    Visual/Auditory: denies changes ~ does not wear glasses or contacts      ROM:   GROSS AROM/PROM  UPPER EXTREMITY  (R) UE: WFL  (L) UE: WFL  LOWER EXTREMITY~ tested in supine  (R) LE: limited as follows: R hip rests in 20 degrees flexion; R knee rests in 35 degrees flexion  (L) LE: limited as follows: L hip rests in 20 degrees flexion; L knee rests in 25 degrees flexion       Lower Extremity Strength  Right LE  Left LE    Hip flexion:  5/5 Hip flexion: 5/5   Knee extension: 4/5 Knee extension: 4/5   Knee flexion: 3+/5 to 4-/5 Knee flexion: 3/5   Ankle dorsiflexion:  1+/5 Ankle dorsiflexion: 0/5   Ankle plantarflexion:  " 1+/5 Ankle plantarflexion: 0/5   Hip abduction: 3+/5* Hip abduction: 3+/5*   Hip adduction: 5/5 Hip adduction 5/5   Hip extension: 4/5 Hip extension: 4/5   *within ROM  Upper extremity strength: WFL for  strength, elbow flexion/extension, shoulder flexion/abduction      Coordination:   Rapid Alternating Movements: NT  Point to Point:    -Finger to Nose: NT   -Heel to Shin: NT    Sensation: intact to LT to B UE and LE  Proprioception: NT    Tone/Spasticity: hypotonia noted to B ankle and foot    Functional Mobility (Bed mobility, transfers)  Mod I to independent       Evaluation   Single Limb Stance R LE NT  (<10 sec = HIGH FALL RISK)   Single Limb Stance L LE NT  (<10 sec = HIGH FALL RISK)   30 second Chair Rise NT   5 times sit to stand 12 seconds, no arms   TUG 10 seconds   Self selected walking speed 0.86 m/sec (6m/7s)   Fast walking speed NT       5x sit to stand normative information:   >12 sec= fall risk for general elderly  >16 sec= fall risk for Parkinson's disease  >10 sec= balance/vestibular dysfunction (<61 y/o)  >14.2 sec= balance/vestibular dysfunction (>61 y/o)  >12 sec= fall risk for CVA    Postural control:  MCTSIB:~ did not have time to test today, but will plan to assess at a future follow-up session.        Gait Assessment:   - AD used: none; B AFO  - Assistance: mod I  - Stairs: NT due to time constraints    GAIT DEVIATIONS:   Kenji displays the following deviations with ambulation: decreased B foot clearance during swing, ER of feet, decreased arm swing, adduction of hips with pronation of feet, overall crouched posture   Impairments contributing to deviations: impaired motor control, decreased LE ROM, decreased LE strength, decreased balance, decreased posture        Endurance Deficit: none noted today. Would like to test 6 minute walk at a future follow-up.     PT Evaluation Completed? Yes      Intake Outcome Measure for FOTO Congenital Anomalies Survey    Therapist reviewed FOTO scores  for Kenji Malone on 8/29/2023.   FOTO documents entered into [email or web address] - see Media section.         Treatment     Total Treatment time separate from Evaluation: 0 minutes    No treatment performed; evaluation only.      Patient Education and Home Exercises     Education provided:   - Pt educated regarding evaluation findings, potential plan of care and scheduling process.      Written Home Exercises Provided: to be provided during a future session.      Assessment     Kenji is a 18 y.o. male referred to outpatient Physical Therapy with a medical diagnosis of Spinal Muscular Atrophy. Patient presents with decreased LE strength, impaired posture, abnormal muscle tone and gait difficulty consistent with diagnosis. Pt participated in a prior therapy episode here at [facility] in 2022. During that initial testing, pt did not do as well as he did today with regard to some measures. This leaves less room for improvement, and PT feels that much of the focus during this plan of care will be to improve pt's knee ROM( if this is possible). Secondly, efforts will be made to strengthen Kenji's LEs, as he did not test as well here as he did in 2022. Today's evaluation was a bit limited by his late arrival and need to use the restroom snf through the 35 minutes he was here. However, PT was able to perform a thorough B LE MMT and pt participated in TUG, 5 x sit<> stand and SSWS tests. His scores for the first 2 are functional, but his SSWS places him in the 20-39 % limited range with respect to independent, safe community ambulators. His B knee ROM is limited with respect to extension, with R knee deficit measured 10 degrees greater than L. PT did not have time to perform formal balance or endurance testing, but will plan to do this at a future follow-up session. PT recommends an 8 week plan of care at 2 x week frequency to address pt's limitations with LE ROM and strength.    Patient prognosis is Fair.   Patient will benefit from  skilled outpatient Physical Therapy to address the deficits stated above and in the chart below, provide patient/family education, and to maximize patient's level of independence.     Plan of care discussed with patient: Yes  Patient's spiritual, cultural and educational needs considered and patient is agreeable to the plan of care and goals as stated below:     Anticipated Barriers for therapy: pt does not drive, chronicity of condition    Medical Necessity is demonstrated by the following  History  Co-morbidities and personal factors that may impact the plan of care [] LOW: no personal factors / co-morbidities  [] MODERATE: 1-2 personal factors / co-morbidities  [x] HIGH: 3+ personal factors / co-morbidities    Moderate / High Support Documentation:  shunt, spina bifida, Chiari II malformation, neurogenic bladder, neurogenic bowel     Examination  Body Structures and Functions, activity limitations and participation restrictions that may impact the plan of care [] LOW: addressing 1-2 elements  [] MODERATE: 3+ elements  [x] HIGH: 4+ elements (please support below)    Moderate / High Support Documentation: strength, ROM, posture, muscle tone     Clinical Presentation [x] LOW: stable  [] MODERATE: Evolving  [] HIGH: Unstable     Decision Making/ Complexity Score: low       Goals:  Short Term Goals: 4 weeks   Pt will be issued first installment of HEP and report at least partial compliance  Pt will improve his B resting knee extension by 2-3 degrees B to allow for improved standing posture and gait fluidity  Pt will participate in 6 minute walk and mCTSIB balance testing and PT to establish appropriate goals as needed  Pt to improve his 5 x sit<>stand test to < 12 seconds for decreased overall fall risk  Pt to improve his SSWS to 1.0 m/sec for improved community ambulation and decreased fall risk  Pt will improve his MMT grades for knee flexion OR hip abduction by 1/3 for improved stability and gait  mechanics    Long Term Goals: 8 weeks   Pt will be independent and at least partially compliant with finalized HEP to help maintain potential gains realized in PT  Pt will improve his B resting knee extension by 4-5 degrees B to allow for improved standing posture and gait fluidity  Pt will improve his SSWS to 1.2 m/sec for improved community ambulation and decreased fall risk  Pt will improve his MMT grades for knee flexion AND hip abduction by 1/3 each for improved stability and gait mechanics  Plan     Plan of care Certification: 8/29/2023 to 10/24/23.    Outpatient Physical Therapy 2 times weekly for 8 weeks to include the following interventions: Gait Training, Neuromuscular Re-ed, Patient Education, Therapeutic Activities, and Therapeutic Exercise.     David Frankel, PT   8/29/2023

## 2023-08-31 PROBLEM — G12.9 SPINAL MUSCULAR ATROPHY: Status: ACTIVE | Noted: 2023-08-31

## 2023-09-05 ENCOUNTER — DOCUMENTATION ONLY (OUTPATIENT)
Dept: REHABILITATION | Facility: HOSPITAL | Age: 18
End: 2023-09-05

## 2023-09-05 ENCOUNTER — CLINICAL SUPPORT (OUTPATIENT)
Dept: REHABILITATION | Facility: HOSPITAL | Age: 18
End: 2023-09-05
Payer: MEDICAID

## 2023-09-05 DIAGNOSIS — R47.1 DYSARTHRIA: Primary | ICD-10-CM

## 2023-09-05 DIAGNOSIS — M25.661 DECREASED RANGE OF MOTION OF BOTH KNEES: Primary | ICD-10-CM

## 2023-09-05 DIAGNOSIS — M25.662 DECREASED RANGE OF MOTION OF BOTH KNEES: Primary | ICD-10-CM

## 2023-09-05 DIAGNOSIS — R26.9 GAIT ABNORMALITY: ICD-10-CM

## 2023-09-05 PROCEDURE — 92507 TX SP LANG VOICE COMM INDIV: CPT

## 2023-09-05 PROCEDURE — 97110 THERAPEUTIC EXERCISES: CPT | Mod: 59,PO

## 2023-09-05 NOTE — PROGRESS NOTES
[facility] OUTPATIENT THERAPY AND WELLNESS   Physical Therapy Treatment Note      Name: Kenji Malone  Clinic Number: [id number]    Therapy Diagnosis:   Encounter Diagnoses   Name Primary?    Decreased range of motion of both knees Yes    Gait abnormality      Physician: Melanie Delgado MD    Visit Date: 9/5/2023    Physician Orders: PT Eval and Treat   Medical Diagnosis from Referral: Spinal Muscular Atrophy  Evaluation Date: 8/29/2023  Authorization Period Expiration: 12/31/2023  Plan of Care Expiration: 10/24/23  Progress Note Due: 9/29/23  Visit # / Visits authorized: 1/ 20 (+eval)  FOTO: 1/3     Precautions: Standard and Fall     Time In: 16:21  Time Out: 17:02  Total Billable Time: 41 minutes (Medicaid - bill therex)    PTA Visit #: 0/5       Subjective     Patient reports: he is doing well. He is seen after SLP session.  He was not yet given a home exercise program.  Response to previous treatment: eval only  Functional change: none    Pain: 0/10  Location: none reported    Objective      Objective Measures updated at progress report unless specified.    Postural control: MCTSIB: 09/05/2023   1. Eyes Open/feet together/Firm:  30 seconds   2. Eyes Closed/feet together/Firm:  30 seconds   3. Eyes Open/feet together/Foam:  30 seconds - min sway   4. Eyes Closed/feet together/Foam:  2 seconds     6-Minute Walk Test  Assistive Device: none  Distance: 670 ft (had to sit after 4 minutes)     Treatment     Kenji received the treatments listed below:      therapeutic exercises to develop strength, endurance, ROM, flexibility, posture, and core stabilization for 41 minutes including:    Objective testing as above    Review of home exercise program:  - supine hip flexor stretch with lower extremity hanging off side of mat, 2x1 min hold per side  - supine hamstring stretch with PT facilitation, 3x30 sec on each leg  - prone knee hangs, 3x30 sec on each leg     Tall kneeling side-stepping along edge of mat while tossing  yellow ball back/forth with PT (performed L/R 3x each way)  Sit on heels <> tall kneeling with explosive hip extension + throwing yellow ball to PT  Sit on heels <> tall kneeling with explosive hip extension + throwing 2kg weight med ball to PT     Core strengthening exercises while seated EOM with feet unsupported:  - Chest presses with 2kg weighted med ball, 2x10  - Shoulder presses with 2kg weighted med ball, 2x10  - Russian twists with 2kg weighted med ball, 2x10      Patient Education and Home Exercises       Education provided:   - home exercise program issued and reviewed    Written Home Exercises Provided: yes. Exercises were reviewed and Kenji was able to demonstrate them prior to the end of the session.  Kenji demonstrated good  understanding of the education provided. See Electronic Medical Record under Patient Instructions for exercises provided during therapy sessions    Assessment     Joshua participated well in first follow-up session with focus on establishing home exercise program and assessing balance as well as gait endurance. He currently is unable to ambulate for the full 6MWT, needing to sit after 4 minutes of walking this session. He was able to pass most mCTSIB conditions though relied on an adaptive position (knees and hips flexed) to maintain static standing balance. While he was able to perform all core activities, he did demonstrate a decreased seated limits of stability when unsupported which indicates a need for greater core strengthening to maximize trunk control. He will benefit from continue skilled outpatient neuro PT to further address functional deficits.     Kenji Is progressing well towards his goals.   Patient prognosis is Fair.     Patient will continue to benefit from skilled outpatient physical therapy to address the deficits listed in the problem list box on initial evaluation, provide pt/family education and to maximize pt's level of independence in the home and community  environment.     Patient's spiritual, cultural and educational needs considered and pt agreeable to plan of care and goals.     Anticipated barriers to physical therapy: pt does not drive, chronicity of condition    Goals:   Short Term Goals: 4 weeks   Pt will be issued first installment of HEP and report at least partial compliance. Ongoing   Pt will improve his B resting knee extension by 2-3 degrees B to allow for improved standing posture and gait fluidity Ongoing   Pt will improve postural control with MCTSIB condition 4 score of at least 10s for decreased fall risk with standing ADLs. Ongoing   Pt to perform 6 minute walk test for full 6 minutes before stopping to improve gait speed & endurance Ongoing   Pt to improve his 5 x sit<>stand test to < 12 seconds for decreased overall fall risk Ongoing   Pt to improve his SSWS to 1.0 m/sec for improved community ambulation and decreased fall risk Ongoing   Pt will improve his MMT grades for knee flexion OR hip abduction by 1/3 for improved stability and gait mechanics Ongoing      Long Term Goals: 8 weeks   Pt will be independent and at least partially compliant with finalized HEP to help maintain potential gains realized in PT Ongoing   Pt will improve his B resting knee extension by 4-5 degrees B to allow for improved standing posture and gait fluidity Ongoing   Pt will improve his SSWS to 1.2 m/sec for improved community ambulation and decreased fall risk Ongoing   Pt will improve his MMT grades for knee flexion AND hip abduction by 1/3 each for improved stability and gait mechanics Ongoing   Pt will improve postural control with MCTSIB condition 4 score of at least 22s for decreased fall risk with standing ADLs. Ongoing   Pt to perform 6 minute walk test for 800 feet or greater to improve gait speed & endurance Ongoing     Plan     Continue PT plan of care with a focus on lower extremity strengthening, improving flexibility, core/balance training, and gait as  appropriate    Laly Fagan, PT

## 2023-09-05 NOTE — PROGRESS NOTES
PT/PTA met face to face to discuss pt's treatment plan and progress towards established goals.  Continue with current PT POC with focus on stretching knees and hips, strengthening and balance.  Patient will be seen by physical therapist at least every sixth treatment or 30 days, whichever occurs first.    Marian Mercado, PTA  09/05/2023

## 2023-09-05 NOTE — PROGRESS NOTES
[facility]  Speech Therapy Treatment Note-  Motor Speech  Date: 9/5/2023     Name: Kenji Malone   MRN: [id number]   Therapy Diagnosis:   Encounter Diagnosis   Name Primary?    Dysarthria Yes   Physician: Order, Paper  Physician Orders: Ambulatory Referral to Speech Therapy   Medical Diagnosis: Spinal Muscular atrophy    Visit #/ Visits Authorized: 1/ 20  Date of Evaluation:  8/29/2023  Insurance Authorization Period: 8/30/2023 to 12/31/2023  Plan of Care Expiration Date:   10/17/2023   Extended Plan of Care:  N/A   Progress Note: 10/5/2023     Time In:  3:30PM  Time Out:  4:18PM  Total Billable Time: 48 mins     Precautions: Standard and Fall  Subjective:   Patient reports: No changes since initial evaluation reported.  He was not compliant to home exercise program.   Response to previous treatment: fair  Pain Scale: no pain indicated throughout session  Objective:   UNTIMED  Procedure   Speech- Language- Voice Therapy     Short Term Goals: (4 weeks) Current Progress:   1. Patient will recall 4/4 clear speech strategies independently.        Progressing/ Not Met 9/5/2023   Patient recalled 4/4 speech strategies independently.   2. Patient will use motor speech strategies when producing functional phrases (ex: food order) with 90% intelligibility to encourage carryover of motor speech strategies.      Progressing/ Not Met 9/5/2023   Not addressed in today's session.      3.  Patient will complete diaphragmatic breathing exercises by producing maximal exhalation via diaphragmic breathing with a duration of 5 seconds in 10 trials, consistently with minimal tactile and verbal clinician cues.      Progressing/ Not Met 9/5/2023   Patient completed  5 sec exhalations during diaphragmatic breathing exercise 15x with minimal tactile and verbal cues.   4.  In order to promote generalization of intelligibility, patient will produce sentences during diaphragmatic breathing while incorporating intelligibility  strategies (overarticulation, increased loudness) at 80% accuracy with moderate verbal and visual cues.      Progressing/ Not Met 2023   Patient produced syllables using clear speech strategies during diaphragmatic breathing with 60% accuracy given moderate verbal and visual cues.   5. Patient will implement increased intonation, overarticulation, and reduced rate strategies to increase overall intelligibility for functional communication with 80% accuracy and minimal verbal cues for use of strategies.      Progressing/ Not Met 2023    Introduced, not formally addressed.   6. Patient will use motor speech strategies and answer questions in conversation with a self-rating of at least 3 on a 3 point scale ( 1 = same as before beginning therapy, 2 =better but not best, 3 =best possible outcome) on  9 /10 trials.        Progressing/ Not Met 2023   Introduced goal, not formally addressed.      Patient Education/Response:   Patient educated regarding the followin. Clear speech strategies SLAP  2. Diaphragmatic breathing  3. Auditory biofeedback    Home program established: yes-diaphragmatic breathing and clear speech strategies.  Patient verbalized understanding to all above education provided.     See Electronic Medical Record under Patient Instructions for exercises provided throughout therapy.  Assessment:   Kenji actively participated in today's session which focused on introducing plan of care goals, education and instruction of clear speech strategies/exercises, and diaphragmatic breathing.    Kenji is progressing well towards his goals. Current goals remain appropriate. Goals to be updated as necessary.     Patient prognosis is Fair. Patient will continue to benefit from skilled outpatient speech and language therapy to address the deficits listed in the problem list on initial evaluation, provide patient/family education and to maximize patient's level of independence in the home and community  environment.   Medical necessity is demonstrated by the following IMPAIRMENTS:  Motor speech: Decreased speech intelligibility results in decreased efficiency communicating medical and social wants and needs in the case of emergency.    Barriers to Therapy: No barriers to therapy identified.  Patient's spiritual, cultural and educational needs considered and patient agreeable to plan of care and goals.  Plan:   Continue Plan of Care with focus on rehabilitation and compensation for motor speech sound deficits.    Kinjal Bravo M.S., CF-SLP  Speech-Language Pathologist  9/5/2023

## 2023-09-11 ENCOUNTER — CLINICAL SUPPORT (OUTPATIENT)
Dept: REHABILITATION | Facility: HOSPITAL | Age: 18
End: 2023-09-11
Payer: MEDICAID

## 2023-09-11 DIAGNOSIS — R47.1 DYSARTHRIA: Primary | ICD-10-CM

## 2023-09-11 PROCEDURE — 92507 TX SP LANG VOICE COMM INDIV: CPT

## 2023-09-11 NOTE — PROGRESS NOTES
[facility]  Speech Therapy Treatment Note-  Motor Speech  Date: 9/11/2023     Name: Kenji Malone   MRN: [id number]   Therapy Diagnosis:   Encounter Diagnosis   Name Primary?    Dysarthria Yes       Physician: Order, Paper  Physician Orders: Ambulatory Referral to Speech Therapy   Medical Diagnosis: Spinal Muscular atrophy    Visit #/ Visits Authorized: 2/ 20  Date of Evaluation:  8/29/2023  Insurance Authorization Period: 8/30/2023 to 12/31/2023  Plan of Care Expiration Date:   10/17/2023   Extended Plan of Care:  N/A   Progress Note: 10/5/2023     Time In:  3:30PM  Time Out:  4:15PM  Total Billable Time: 45 mins     Precautions: Standard and Fall  Subjective:   Patient reports: No major changes since previous session.  He was not compliant to home exercise program.   Response to previous treatment: fair  Pain Scale: no pain indicated throughout session  Objective:   UNTIMED  Procedure   Speech- Language- Voice Therapy     Short Term Goals: (4 weeks) Current Progress:   1. Patient will recall 4/4 clear speech strategies independently.        Progressing/ Not Met 9/11/2023   Patient recalled 3/4 strategies independently at the beginning of the session and end of the session.   2. Patient will use motor speech strategies when producing functional phrases (ex: food order) with 90% intelligibility to encourage carryover of motor speech strategies.      Progressing/ Not Met 9/11/2023   Not addressed in today's session.      3.  Patient will complete diaphragmatic breathing exercises by producing maximal exhalation via diaphragmic breathing with a duration of 5 seconds in 10 trials, consistently with minimal tactile and verbal clinician cues.      Progressing/ Not Met 9/11/2023   Patient completed  5 sec exhalations during diaphragmatic breathing exercise 20x with minimal cues.   4.  In order to promote generalization of intelligibility, patient will produce sentences during diaphragmatic breathing while  incorporating intelligibility strategies (overarticulation, increased loudness) at 80% accuracy with moderate verbal and visual cues.      Progressing/ Not Met 2023   Not addressed in today's session.     5. Patient will implement increased intonation, overarticulation, and reduced rate strategies to increase overall intelligibility for functional communication with 80% accuracy and minimal verbal cues for use of strategies.      Progressing/ Not Met 2023   Patient produced CV and CVC minimal pairs of /t/ and /k/ using the strategies of over articulation and  easy onset with 70% accuracy given moderate verbal and visual cues.   6. Patient will use motor speech strategies and answer questions in conversation with a self-rating of at least 3 on a 3 point scale ( 1 = same as before beginning therapy, 2 =better but not best, 3 =best possible outcome) on  10 trials.        Progressing/ Not Met 2023   Introduced goal, not formally addressed.      Patient Education/Response:   Patient educated regarding the followin. Clear speech strategies SLAP  2. Diaphragmatic breathing  3. Auditory biofeedback    Home program established: yes-diaphragmatic breathing and clear speech strategies.  Patient verbalized understanding to all above education provided.     See Electronic Medical Record under Patient Instructions for exercises provided throughout therapy.  Assessment:   Kenji actively participated in today's session which focused on introducing plan of care goals, education and instruction of clear speech strategies/exercises, and diaphragmatic breathing. Patient's strengths are awareness of deficits. Patient's current focus is on improving intelligibility during the production of specific sounds to carryover into functional phrases. Patient did not complete the home exercise implemented in previous session. Speech Therapist reminded patient the importance of home exercise program to increase functional  carryover over and improve overall speech intelligibility during activities of daily living.    Kenji is progressing well towards his goals. Current goals remain appropriate. Goals to be updated as necessary.     Patient prognosis is Fair. Patient will continue to benefit from skilled outpatient speech and language therapy to address the deficits listed in the problem list on initial evaluation, provide patient/family education and to maximize patient's level of independence in the home and community environment.   Medical necessity is demonstrated by the following IMPAIRMENTS:  Motor speech: Decreased speech intelligibility results in decreased efficiency communicating medical and social wants and needs in the case of emergency.    Barriers to Therapy: No barriers to therapy identified.  Patient's spiritual, cultural and educational needs considered and patient agreeable to plan of care and goals.  Plan:   Continue Plan of Care with focus on rehabilitation and compensation for motor speech sound deficits.    Kinjal Bravo M.S., CF-SLP  Speech-Language Pathologist  9/11/2023

## 2023-09-21 ENCOUNTER — CLINICAL SUPPORT (OUTPATIENT)
Dept: REHABILITATION | Facility: HOSPITAL | Age: 18
End: 2023-09-21
Payer: MEDICAID

## 2023-09-21 ENCOUNTER — DOCUMENTATION ONLY (OUTPATIENT)
Dept: REHABILITATION | Facility: HOSPITAL | Age: 18
End: 2023-09-21

## 2023-09-21 DIAGNOSIS — R26.9 GAIT ABNORMALITY: ICD-10-CM

## 2023-09-21 DIAGNOSIS — M25.661 DECREASED RANGE OF MOTION OF BOTH KNEES: Primary | ICD-10-CM

## 2023-09-21 DIAGNOSIS — M25.662 DECREASED RANGE OF MOTION OF BOTH KNEES: Primary | ICD-10-CM

## 2023-09-21 PROCEDURE — 97110 THERAPEUTIC EXERCISES: CPT | Mod: PO

## 2023-09-21 NOTE — PROGRESS NOTES
[facility]   Physical Therapy Treatment Note      Name: Kenji Malone  Clinic Number: [id number]    Therapy Diagnosis:   Encounter Diagnoses   Name Primary?    Decreased range of motion of both knees Yes    Gait abnormality        Physician: Melanie Delgado MD    Visit Date: 9/21/2023    Physician Orders: PT Eval and Treat   Medical Diagnosis from Referral: Spinal Muscular Atrophy  Evaluation Date: 8/29/2023  Authorization Period Expiration: 12/31/2023  Plan of Care Expiration: 10/24/23  Progress Note Due: 9/29/23  Visit # / Visits authorized: 2/ 20 (+eval)  FOTO: 1/3     Precautions: Standard and Fall     Time In: 14:17  Time Out: 15:02  Total Billable Time: 45 minutes (Medicaid - TE only)    PTA Visit #: 0/5     Subjective     Patient reports: he is doing well. He had some doctor's appts lately and that's why he couldn't make it to some PT sessions. No new complaints.   He was compliant with given home exercise program.  Response to previous treatment: good  Functional change: none    Pain: 0/10  Location: none reported    Objective      Objective Measures updated at progress report unless specified.    Treatment     Kenji received the treatments listed below:      therapeutic exercises to develop strength, endurance, ROM, flexibility, posture, and core stabilization for 45 minutes including:    SciFit reciprocal stepper on L5 for [place], x10 min     Tall kneeling side-stepping along edge of mat while tossing yellow ball back/forth with PT (performed L/R 3x each way)  Sit on heels <> tall kneeling with explosive hip extension + throwing 3kg weighted med ball to PT, 2x10  Sit on heels <> tall kneeling with explosive hip extension + raising 3kg    Crawling along long edge of gym mat with focus on maintaining anterior weight shift onto UE as well as achieving hip flexion vs. circumduction for crawling. Performed x6 laps of crawling with supervision.     Core strengthening  exercises while seated EOM on blue dynadisc with feet unsupported:  - Chest presses with 3kg weighted med ball, 2x10  - Shoulder presses with 3kg weighted med ball, 2x10  - Russian twists with 3kg weighted med ball, 2x10  - Full-range sit-ups to short sit EOM, 2x10 ~ min A needed towards end of each set 2/2 fatigue     LE strengthening:  - LAQ with 2# ankle weights, 2x10/leg  - Prone hamstring curls with 2# ankle weights, 2x10/leg (active-assist as needed on left lower extremity)      Patient Education and Home Exercises       Education provided:   - home exercise program issued and reviewed    Written Home Exercises Provided: Patient instructed to cont prior HEP. Exercises were reviewed and Kenji was able to demonstrate them prior to the end of the session.  Kenji demonstrated good  understanding of the education provided. See Electronic Medical Record under Patient Instructions for exercises provided during therapy sessions    Assessment     Joshua tolerated addition of core exercises, though required assist towards end of sit-ups due to muscular fatigue. He had some difficulty with crawling at first, mainly due to poor weight-bearing through his upper extremities and thus placing his hips in a significantly flexed position, leading to circumduction at the hip to achieve a step. He is making progress with general activity tolerance but remains limited by decreased muscular strength and endurance. He remains appropriate for outpatient neuro PT to address mobility deficits, improve motor control, and increase independence.      Kenji Is progressing well towards his goals.   Patient prognosis is Fair.     Patient will continue to benefit from skilled outpatient physical therapy to address the deficits listed in the problem list box on initial evaluation, provide pt/family education and to maximize pt's level of independence in the home and community environment.     Patient's spiritual, cultural and educational  needs considered and pt agreeable to plan of care and goals.     Anticipated barriers to physical therapy: pt does not drive, chronicity of condition    Goals:   Short Term Goals: 4 weeks   Pt will be issued first installment of HEP and report at least partial compliance. Ongoing   Pt will improve his B resting knee extension by 2-3 degrees B to allow for improved standing posture and gait fluidity Ongoing   Pt will improve postural control with MCTSIB condition 4 score of at least 10s for decreased fall risk with standing ADLs. Ongoing   Pt to perform 6 minute walk test for full 6 minutes before stopping to improve gait speed & endurance Ongoing   Pt to improve his 5x sit<>stand test to < 12 seconds for decreased overall fall risk Ongoing   Pt to improve his SSWS to 1.0 m/sec for improved community ambulation and decreased fall risk Ongoing   Pt will improve his MMT grades for knee flexion OR hip abduction by 1/3 for improved stability and gait mechanics Ongoing      Long Term Goals: 8 weeks   Pt will be independent and at least partially compliant with finalized HEP to help maintain potential gains realized in PT Ongoing   Pt will improve his B resting knee extension by 4-5 degrees B to allow for improved standing posture and gait fluidity Ongoing   Pt will improve his SSWS to 1.2 m/sec for improved community ambulation and decreased fall risk Ongoing   Pt will improve his MMT grades for knee flexion AND hip abduction by 1/3 each for improved stability and gait mechanics Ongoing   Pt will improve postural control with MCTSIB condition 4 score of at least 22s for decreased fall risk with standing ADLs. Ongoing   Pt to perform 6 minute walk test for 800 feet or greater to improve gait speed & endurance Ongoing     Plan     Continue PT plan of care with a focus on lower extremity strengthening, improving flexibility, core/balance training, and gait as appropriate    Laly Fagan, PT

## 2023-09-26 ENCOUNTER — DOCUMENTATION ONLY (OUTPATIENT)
Dept: REHABILITATION | Facility: HOSPITAL | Age: 18
End: 2023-09-26
Payer: MEDICAID

## 2023-09-26 DIAGNOSIS — R47.1 DYSARTHRIA: Primary | ICD-10-CM

## 2023-09-26 NOTE — PROGRESS NOTES
No Show Note/Documentation    Patient: Kenji Malone  Date of Session: 9/26/2023  Diagnosis:   Encounter Diagnosis   Name Primary?    Dysarthria Yes      MRN: [id number]    Kenji Malone did not attend his  scheduled therapy appointment today. He did not call to cancel nor reschedule. This is the 1st appointment that he has not attended. Next appointment is scheduled for 10/4/2023 and will follow up with patient at that time. No charges have been posted today.     Kinjal Bravo CCC-SLP   9/26/2023

## 2023-09-26 NOTE — PROGRESS NOTES
Kenji did not attend his scheduled therapy session today at 4:15 PM. PT was planning to perform a monthly reassessment today. Pt's next visit is on 9/29/23, but he is scheduled with a PTA that day. No charges posted to account.    David Frankel, PT  9/26/2023

## 2023-10-10 ENCOUNTER — CLINICAL SUPPORT (OUTPATIENT)
Dept: REHABILITATION | Facility: HOSPITAL | Age: 18
End: 2023-10-10
Payer: MEDICAID

## 2023-10-10 DIAGNOSIS — R26.9 GAIT ABNORMALITY: ICD-10-CM

## 2023-10-10 DIAGNOSIS — R47.1 DYSARTHRIA: Primary | ICD-10-CM

## 2023-10-10 DIAGNOSIS — M25.662 DECREASED RANGE OF MOTION OF BOTH KNEES: Primary | ICD-10-CM

## 2023-10-10 DIAGNOSIS — M25.661 DECREASED RANGE OF MOTION OF BOTH KNEES: Primary | ICD-10-CM

## 2023-10-10 PROCEDURE — 97110 THERAPEUTIC EXERCISES: CPT | Mod: PO,59

## 2023-10-10 PROCEDURE — 92507 TX SP LANG VOICE COMM INDIV: CPT | Mod: PO

## 2023-10-10 NOTE — PROGRESS NOTES
[facility]  Speech Therapy Treatment Note-  Motor Speech  Date: 10/10/2023     Name: Kenji Malone   MRN: [id number]   Therapy Diagnosis:   Encounter Diagnosis   Name Primary?    Dysarthria Yes       Physician: Order, Paper  Physician Orders: Ambulatory Referral to Speech Therapy   Medical Diagnosis: Spinal Muscular atrophy    Visit #/ Visits Authorized: 3/ 20  Date of Evaluation:  8/29/2023  Insurance Authorization Period: 8/30/2023 to 12/31/2023  Plan of Care Expiration Date:   10/17/2023   Extended Plan of Care:  N/A   Progress Note: 10/5/2023     Time In:  4:15 PM  Time Out:  5:00 PM  Total Billable Time: 45 mins     Precautions: Standard and Fall  Subjective:   Patient reports: No major changes since previous session.  He was not compliant to home exercise program.   Response to previous treatment: fair  Pain Scale: no pain indicated throughout session  Objective:   UNTIMED  Procedure   Speech- Language- Voice Therapy     Short Term Goals: (4 weeks) Current Progress:   1. Patient will recall 4/4 clear speech strategies independently.      Met 10/10/2023   Patient recalled 4/4 strategies independently at the beginning of the session and end of the session.     2. Patient will use motor speech strategies when producing functional phrases (ex: food order) with 90% intelligibility to encourage carryover of motor speech strategies.      Progressing/ Not Met 10/10/2023   Patient recited words with target phonemes in all position of words. He did better with sounds in initial position of words than compared to target sounds in medial and final positions and when target sounds were combined in words.   Sentences -   /k/ - 90% accuracy independently, 100% accuracy given cues.   /t/ - 87% accuracy independently, 100% accuracy given cues  /g/ - 100% accuracy independently     Minimal pairs- words (ie, die-jacques)  /g/ and /d/ - 30% accuracy independently, 90% accuracy given maximum visual and verbal cues  /t/  and /k/ - 45% accuracy independently, 100% accuracy given maximum visual and verbal cues for tongue placement.    Limited carryover in conversation      3.  Patient will complete diaphragmatic breathing exercises by producing maximal exhalation via diaphragmic breathing with a duration of 5 seconds in 10 trials, consistently with minimal tactile and verbal clinician cues.      Progressing/ Not Met 10/10/2023   Reviewed purpose of breathing and speaking. Reviewed breathing exercises.       4.  In order to promote generalization of intelligibility, patient will produce sentences during diaphragmatic breathing while incorporating intelligibility strategies (overarticulation, increased loudness) at 80% accuracy with moderate verbal and visual cues.      Progressing/ Not Met 10/10/2023   Not addressed in today's session.       5. Patient will implement increased intonation, overarticulation, and reduced rate strategies to increase overall intelligibility for functional communication with 80% accuracy and minimal verbal cues for use of strategies.      Progressing/ Not Met 10/10/2023   Patient recited words with target phonemes in all position of words. He did better with sounds in initial position of words than compared to target sounds in medial and final positions and when target sounds were combined in words.   Sentences -   /k/ - 90% accuracy independently, 100% accuracy given cues.   /t/ - 87% accuracy independently, 100% accuracy given cues  /g/ - 100% accuracy independently     Minimal pairs- words (ie, die-jacques)  /g/ and /d/ - 30% accuracy independently, 90% accuracy given maximum visual and verbal cues  /t/ and /k/ - 45% accuracy independently, 100% accuracy given maximum visual and verbal cues for tongue placement.    Limited carryover in conversation     6. Patient will use motor speech strategies and answer questions in conversation with a self-rating of at least 3 on a 3 point scale ( 1 = same as before  beginning therapy, 2 =better but not best, 3 =best possible outcome) on  9 /10 trials.        Progressing/ Not Met 10/10/2023   Reviewed strategies. He is aware of errors but maximum cues for tongue placement required.            Patient Education/Response:   Patient educated regarding the followin. Clear speech strategies SLAP  2. Diaphragmatic breathing  3. Auditory biofeedback    Home program established: yes-diaphragmatic breathing and clear speech strategies when reciting words and sentences. Patient given worksheets with pictures of target phonemes (t, d, k, g) and instructed to recite the words and put in sentences while recording himself.  Patient verbalized understanding to all above education provided.     See Electronic Medical Record under Patient Instructions for exercises provided throughout therapy.  Assessment:   Kenji actively participated in today's session which focused on introducing plan of care goals, education and instruction of clear speech strategies/exercises, and diaphragmatic breathing. Patient's strengths are awareness of deficits.  Limited carryover in conversation. Patient's current focus is on improving intelligibility during the production of specific sounds to carryover into functional phrases. Maximum verbal and visual cues needed to remind patient of tongue placement for target phonemes. Patient did complete the home exercise implemented in previous session. Speech Therapist reminded patient the importance of home exercise program to increase functional carryover over and improve overall speech intelligibility during activities of daily living. Kenji is progressing well towards his goals. Current goals remain appropriate. Goals to be updated as necessary. Discharge at end of plan of care.    Patient prognosis is Fair. Patient will continue to benefit from skilled outpatient speech and language therapy to address the deficits listed in the problem list on initial evaluation,  provide patient/family education and to maximize patient's level of independence in the home and community environment.   Medical necessity is demonstrated by the following IMPAIRMENTS:  Motor speech: Decreased speech intelligibility results in decreased efficiency communicating medical and social wants and needs in the case of emergency.    Barriers to Therapy: No barriers to therapy identified.  Patient's spiritual, cultural and educational needs considered and patient agreeable to plan of care and goals.  Plan:   Continue Plan of Care with focus on rehabilitation and compensation for motor speech sound deficits. Discharge at end of Plan of Care.    SASHA Medrano., CCC-SLP, CBIS  Speech-Language Pathologist  Certified Brain Injury Specialist     10/10/2023

## 2023-10-10 NOTE — PROGRESS NOTES
[facility]   Physical Therapy Treatment Note/Reassessment     Name: Kenji Malone  Clinic Number: [id number]    Therapy Diagnosis:   Encounter Diagnoses   Name Primary?    Decreased range of motion of both knees Yes    Gait abnormality          Physician: Melanie Delgado MD    Visit Date: 10/10/2023    Physician Orders: PT Eval and Treat   Medical Diagnosis from Referral: Spinal Muscular Atrophy  Evaluation Date: 8/29/2023  Authorization Period Expiration: 12/31/2023  Plan of Care Expiration: 10/24/23  Progress Note Due: 9/29/23  Visit # / Visits authorized: 3/ 20 (+eval)  FOTO: 2/3     Precautions: Standard and Fall     Time In: 1532   Time Out: 1615   Total Billable Time: 43  minutes (Medicaid - TE only)    PTA Visit #: 0/5     Subjective     Patient reports: he is doing well. He had to go to [place] to visit his grandmother and has been unable to attend therapy recently. He assures PT that he will be in town for awhile.   He was partially compliant with home exercise program(did not perform any exercise while in [place]).  Response to previous treatment: good  Functional change: none    Pain: 0/10  Location: N/A    Objective      Objective Measures updated at progress report unless specified. See below:    Eval  ROM:   GROSS AROM/PROM    LOWER EXTREMITY~ tested in supine  (R) LE: limited as follows: R hip rests in 20 degrees flexion; R knee rests in 35 degrees flexion  (L) LE: limited as follows: L hip rests in 20 degrees flexion; L knee rests in 25 degrees flexion    10/10/23  ROM:   GROSS AROM/PROM    LOWER EXTREMITY~ tested in supine  (R) LE: limited as follows: R hip rests in 10 degrees flexion; R knee rests in 25 degrees flexion  (L) LE: limited as follows: L hip rests in 10 degrees flexion; L knee rests in 22 degrees flexion     Lower Extremity Strength  Right LE eval  10/10/23 Left LE eval  10/10/23   Hip flexion:  5/5 5/5 Hip flexion: 5/5 5/5   Knee extension: 4/5 4+/5  Knee extension: 4/5 4+/5   Knee flexion: 3+/5 to 4-/5 5/5 Knee flexion: 3/5 3+/5   Ankle dorsiflexion:  1+/5 1+/5 Ankle dorsiflexion: 0/5 0/5   Ankle plantarflexion:  1+/5 1/5 Ankle plantarflexion: 0/5 1/5   Hip abduction: 3+/5* 3+/5* Hip abduction: 3+/5* 3+5*   Hip adduction: 5/5 5/5 Hip adduction 5/5 5/5   Hip extension: 4/5 3+/5 Hip extension: 4/5 4+/5   *within ROM        Evaluation 10/10/23   Single Limb Stance R LE NT  (<10 sec = HIGH FALL RISK) NT  (<10 sec = HIGH FALL RISK)   Single Limb Stance L LE NT  (<10 sec = HIGH FALL RISK) NT  (<10 sec = HIGH FALL RISK)   30 second Chair Rise NT NT   5 times sit to stand 12 seconds, no arms 11 seconds, no arms   TUG 10 seconds 12 seconds   Self selected walking speed 0.86 m/sec (6m/7s) 0.86 m/sec (6m/7s)   Fast walking speed NT NT         5x sit to stand normative information:   >12 sec= fall risk for general elderly  >16 sec= fall risk for Parkinson's disease  >10 sec= balance/vestibular dysfunction (<61 y/o)  >14.2 sec= balance/vestibular dysfunction (>61 y/o)  >12 sec= fall risk for CVA      Postural control: MCTSIB: 09/05/2023 10/10/23   1. Eyes Open/feet together/Firm:  30 seconds 30 seconds   2. Eyes Closed/feet together/Firm:  30 seconds 30 seconds~ very slight sway   3. Eyes Open/feet together/Foam:  30 seconds - min sway 30 seconds~ 2 attempts to pass   4. Eyes Closed/feet together/Foam:  2 seconds 30 seconds~ min sway and increased knee flexion      9/5/23  6-Minute Walk Test  Assistive Device: none  Distance: 670 ft (had to sit after 4 minutes)      10/10/23  6-Minute Walk Test  Assistive Device: none  Distance: 1013 ft        Measure Score Range Intake Score 10/10/2023 Score Score Interpretation  FOTO General Neurological 0 - 100  intake=40.1; 10/10/23= 70.5 Lower Score = Greater Disability    Treatment     Kenji received the treatments listed below:      therapeutic exercises to develop strength, endurance, ROM, flexibility, posture, and core  stabilization for 43 minutes including:  (All time spent on tests and measures)      Patient Education and Home Exercises       Education provided:   - home exercise program issued and reviewed    Written Home Exercises Provided: Patient instructed to cont prior HEP. Exercises were reviewed and Kenji was able to demonstrate them prior to the end of the session.  Kenji demonstrated good  understanding of the education provided. See Electronic Medical Record under Patient Instructions for exercises provided during therapy sessions    Assessment     Joshua tolerated today's session well for his monthly reassessment. This comes late, due to pt not attending any sessions since 9/21/23. He was out of town visiting his grandmother who has been ill. He assured PT today that he will remain in town and be able to attend future therapy sessions. With regard to today's testing, Joshua did quite well. Improvements were noted for some LE strength grades and his resting B hip and knee ROM measurements were better when compared to evaluation. His 5 x sit<> stand score improved only by 1 second but he passed all 4 conditions of the mCTSIB. He also completed the 6 minute walk test and traveled 1013 feet. He is making progress with general activity tolerance but remains limited by decreased muscular strength and endurance. He remains appropriate for outpatient neuro PT to address mobility deficits, improve motor control, and increase independence. PT will emphasize that pt MUST attend future visits if he is to remain on the therapy schedule.    Kenji Is progressing well towards his goals.   Patient prognosis is Fair.     Patient will continue to benefit from skilled outpatient physical therapy to address the deficits listed in the problem list box on initial evaluation, provide pt/family education and to maximize pt's level of independence in the home and community environment.     Patient's spiritual, cultural and educational needs  considered and pt agreeable to plan of care and goals.     Anticipated barriers to physical therapy: pt does not drive, chronicity of condition    Goals:   Short Term Goals: 4 weeks   Pt will be issued first installment of HEP and report at least partial compliance~ MET, 10/10/23   Pt will improve his B resting knee extension by 2-3 degrees B to allow for improved standing posture and gait fluidity ~ MET, 10/10/23  Pt will improve postural control with MCTSIB condition 4 score of at least 10s for decreased fall risk with standing ADLs~ MET, 10/10/23  Pt to perform 6 minute walk test for full 6 minutes before stopping to improve gait speed & endurance ~  MET, 10/10/23  Pt to improve his 5x sit<>stand test to < 12 seconds for decreased overall fall risk ~ MET, 10/10/23  Pt to improve his SSWS to 1.0 m/sec for improved community ambulation and decreased fall risk Ongoing   Pt will improve his MMT grades for knee flexion OR hip abduction by 1/3 for improved stability and gait mechanics ~ MET, 10/10/23     Long Term Goals: 8 weeks   Pt will be independent and at least partially compliant with finalized HEP to help maintain potential gains realized in PT Ongoing   Pt will improve his B resting knee extension by 4-5 degrees B to allow for improved standing posture and gait fluidity~MET, 10/10/23  Pt will improve his SSWS to 1.2 m/sec for improved community ambulation and decreased fall risk Ongoing   Pt will improve his MMT grades for knee flexion AND hip abduction by 1/3 each for improved stability and gait mechanics Ongoing and progressing  Pt will improve postural control with MCTSIB condition 4 score of at least 22s for decreased fall risk with standing ADLs ~ MET, 10/10/23   Pt to perform 6 minute walk test for 800 feet or greater to improve gait speed & endurance ~ MET, 10/10/23 ~ Revise this goal to [id number] feet    Plan     Continue PT plan of care with a focus on lower extremity strengthening, improving  flexibility, core/balance training, and gait as appropriate    David Frankel, PT   10/10/2023

## 2023-10-12 ENCOUNTER — CLINICAL SUPPORT (OUTPATIENT)
Dept: REHABILITATION | Facility: HOSPITAL | Age: 18
End: 2023-10-12
Payer: MEDICAID

## 2023-10-12 DIAGNOSIS — R26.9 GAIT ABNORMALITY: ICD-10-CM

## 2023-10-12 DIAGNOSIS — M25.662 DECREASED RANGE OF MOTION OF BOTH KNEES: Primary | ICD-10-CM

## 2023-10-12 DIAGNOSIS — M25.661 DECREASED RANGE OF MOTION OF BOTH KNEES: Primary | ICD-10-CM

## 2023-10-12 PROCEDURE — 97110 THERAPEUTIC EXERCISES: CPT | Mod: PO

## 2023-10-12 NOTE — PROGRESS NOTES
[facility]   Physical Therapy Treatment Note     Name: Kenji Malone  Clinic Number: [id number]    Therapy Diagnosis:   Encounter Diagnoses   Name Primary?    Decreased range of motion of both knees Yes    Gait abnormality        Physician: Melanie Delgado MD    Visit Date: 10/12/2023    Physician Orders: PT Eval and Treat   Medical Diagnosis from Referral: Spinal Muscular Atrophy  Evaluation Date: 8/29/2023  Authorization Period Expiration: 12/31/2023  Plan of Care Expiration: 10/24/23  Progress Note Due: 10/24/23  Visit # / Visits authorized: 4/ 20 (+eval)  FOTO: 2/3     Precautions: Standard and Fall     Time In: 15:18  Time Out: 15:58   Total Billable Time: 40 minutes (Medicaid - TE only)    PTA Visit #: 0/5     Subjective     Patient reports: he is doing well. No new complaints.    He was partially compliant with home exercise program.  Response to previous treatment: good  Functional change: none    Pain: 0/10  Location: N/A    Objective      Objective Measures updated at progress report unless specified. Last taken on 10/10/2023.    Treatment     Kenji received the treatments listed below:      therapeutic exercises to develop strength, endurance, ROM, flexibility, posture, and core stabilization for 40 minutes including:    SciFit reciprocal stepper on L5 for [place], x8 min      Tall kneeling side-stepping along edge of mat performed L/R 3x each way; last 2 laps patient passing 3kg weighted med ball back/forth with PT   Sit on heels <> tall kneeling with explosive hip extension + throwing 3kg weighted med ball to PT, 2x10  Sit on heels <> tall kneeling with explosive hip extension + raising 3kg overhead, 2x10     Quadruped + alternating upper extremity raise, 2x10  Quadruped + alternating lower extremity raise, 2x10      Core strengthening exercises while seated EOM with feet unsupported:  - Chest presses with 3kg weighted med ball, 2x10  - Shoulder presses with 3kg  weighted med ball, 2x10  - Russian twists with 3kg weighted med ball, 2x10  - Full-range sit-ups to short sit EOM, 5x ~ min A needed, unable to do a 2nd set  - Partial range sit-ups (reclined on wedge) to short sit EOM, 2x10     LE strengthening:  - prone hamstring curls with 1.5# ankle weights, 2x10/leg ~min A on left lower extremity as needed      Patient Education and Home Exercises       Education provided:   - home exercise program issued and reviewed    Written Home Exercises Provided: Patient instructed to cont prior HEP. Exercises were reviewed and Kenji was able to demonstrate them prior to the end of the session.  Kenji demonstrated good  understanding of the education provided. See Electronic Medical Record under Patient Instructions for exercises provided during therapy sessions    Assessment     Joshua continues to demonstrate decreased core strength as he was unable to perform full range sit-ups for more than 1 set today, indicating need for more of a core strength focus. He also displayed quick muscular fatigue with scapular and lateral hip mm. when in quadruped which suggests poor proximal stability. He is, however, participating very well and is improving with overall activity tolerance. He remains appropriate for outpatient neuro PT.     Kenji Is progressing well towards his goals.   Patient prognosis is Fair.     Patient will continue to benefit from skilled outpatient physical therapy to address the deficits listed in the problem list box on initial evaluation, provide pt/family education and to maximize pt's level of independence in the home and community environment.     Patient's spiritual, cultural and educational needs considered and pt agreeable to plan of care and goals.     Anticipated barriers to physical therapy: pt does not drive, chronicity of condition    Goals:   Short Term Goals: 4 weeks   Pt will be issued first installment of HEP and report at least partial compliance~ MET,  10/10/23   Pt will improve his B resting knee extension by 2-3 degrees B to allow for improved standing posture and gait fluidity ~ MET, 10/10/23  Pt will improve postural control with MCTSIB condition 4 score of at least 10s for decreased fall risk with standing ADLs~ MET, 10/10/23  Pt to perform 6 minute walk test for full 6 minutes before stopping to improve gait speed & endurance ~  MET, 10/10/23  Pt to improve his 5x sit<>stand test to < 12 seconds for decreased overall fall risk ~ MET, 10/10/23  Pt to improve his SSWS to 1.0 m/sec for improved community ambulation and decreased fall risk Ongoing   Pt will improve his MMT grades for knee flexion OR hip abduction by 1/3 for improved stability and gait mechanics ~ MET, 10/10/23     Long Term Goals: 8 weeks   Pt will be independent and at least partially compliant with finalized HEP to help maintain potential gains realized in PT Ongoing   Pt will improve his B resting knee extension by 4-5 degrees B to allow for improved standing posture and gait fluidity~MET, 10/10/23  Pt will improve his SSWS to 1.2 m/sec for improved community ambulation and decreased fall risk Ongoing   Pt will improve his MMT grades for knee flexion AND hip abduction by 1/3 each for improved stability and gait mechanics Ongoing and progressing  Pt will improve postural control with MCTSIB condition 4 score of at least 22s for decreased fall risk with standing ADLs ~ MET, 10/10/23   Pt to perform 6 minute walk test for 800 feet or greater to improve gait speed & endurance ~ MET, 10/10/23 ~ Revise this goal to [id number] feet    Plan     Continue PT plan of care with a focus on lower extremity strengthening, improving flexibility, core/balance training, and gait as appropriate    Laly Fagan, PT   10/12/2023

## 2023-10-17 ENCOUNTER — CLINICAL SUPPORT (OUTPATIENT)
Dept: REHABILITATION | Facility: HOSPITAL | Age: 18
End: 2023-10-17
Payer: MEDICAID

## 2023-10-17 DIAGNOSIS — R47.1 DYSARTHRIA: Primary | ICD-10-CM

## 2023-10-17 DIAGNOSIS — R26.9 GAIT ABNORMALITY: ICD-10-CM

## 2023-10-17 DIAGNOSIS — R26.89 BALANCE DISORDER: Primary | ICD-10-CM

## 2023-10-17 PROCEDURE — 97110 THERAPEUTIC EXERCISES: CPT | Mod: PO,59

## 2023-10-17 PROCEDURE — 92507 TX SP LANG VOICE COMM INDIV: CPT | Mod: PO

## 2023-10-17 NOTE — PLAN OF CARE
[facility]  Speech Therapy Discharge Note    Name: Kenji Malone  Clinic Number: [id number]    Therapy Diagnosis:   Encounter Diagnosis   Name Primary?    Dysarthria Yes     Physician: Order, Paper    Physician: Order, Paper  Physician Orders: Ambulatory Referral to Speech Therapy   Medical Diagnosis: Spinal Muscular atrophy     Visit #/ Visits Authorized: 4/ 20  Date of Evaluation:  8/29/2023  Insurance Authorization Period: 8/30/2023 to 12/31/2023  Plan of Care Expiration Date:   10/17/2023     Date of Last visit: 10/17/23  Total Visits Received: 4    ASSESSMENT      Kenji actively participated in today's session which focused on introducing plan of care goals, education and instruction of clear speech strategies/exercises, and diaphragmatic breathing.   Patient's strengths are awareness of deficits and his motivation.  Limited carryover in conversation. Patient's current focus is on improving intelligibility during the production of specific sounds to carryover into functional phrases. Moderate verbal and visual cues needed to remind patient of tongue placement for target phonemes in connected speech. Patient did complete the home exercise implemented in previous session. Speech Therapist reminded patient the importance of home exercise program to increase functional carryover over and improve overall speech intelligibility during activities of daily living. Kenji is progressing well towards his goals. Patient prognosis is Fair based on chronic condition and limited carryover in conversation. Patient may benefit from outpatient speech and language therapy to address the deficits at the university graduate level as an ongoing teaching case for  graduate clinicians if he chooses to continue receiving speech therapy. Discharge from outpatient speech therapy at this facility due to maximum rehab potential.    Discharge reason: Patient has reached the maximum rehab potential for the present  time    Short Term Goals: (4 weeks) Current Progress:   1. Patient will recall 4/4 clear speech strategies independently.    met   2. Patient will use motor speech strategies when producing functional phrases (ex: food order) with 90% intelligibility to encourage carryover of motor speech strategies.    Progressing/not met   3.  Patient will complete diaphragmatic breathing exercises by producing maximal exhalation via diaphragmic breathing with a duration of 5 seconds in 10 trials, consistently with minimal tactile and verbal clinician cues.    Progressing/not met   4.  In order to promote generalization of intelligibility, patient will produce sentences during diaphragmatic breathing while incorporating intelligibility strategies (overarticulation, increased loudness) at 80% accuracy with moderate verbal and visual cues.     Progressing/not met    5. Patient will implement increased intonation, overarticulation, and reduced rate strategies to increase overall intelligibility for functional communication with 80% accuracy and minimal verbal cues for use of strategies.      Progressing/not met   6. Patient will use motor speech strategies and answer questions in conversation with a self-rating of at least 3 on a 3 point scale ( 1 = same as before beginning therapy, 2 =better but not best, 3 =best possible outcome) on  9 /10 trials.      Progressing/not met         Long Term Goals: (6 weeks) Current Progress:   He  will develop functional and intelligible speech and utilize compensatory strategies through the use of adequate labial and lingual function, increased articulatory precision and speech prosody.    Progressing/not met   He will develop use of speech strategies in various environments with familiar and unfamiliar listeners to increase intelligibility.       Progressing/not met        PLAN   This patient is discharged from Speech Therapy      BONNIE Velasco, CCC-SLP, CBIS

## 2023-10-17 NOTE — PROGRESS NOTES
"[facility]  Speech Therapy Treatment Note-  Motor Speech  Date: 10/17/2023     Name: Kenji Malone   MRN: [id number]   Therapy Diagnosis:   Encounter Diagnosis   Name Primary?    Dysarthria Yes       Physician: Order, Paper  Physician Orders: Ambulatory Referral to Speech Therapy   Medical Diagnosis: Spinal Muscular atrophy    Visit #/ Visits Authorized: 4/ 20  Date of Evaluation:  8/29/2023  Insurance Authorization Period: 8/30/2023 to 12/31/2023  Plan of Care Expiration Date:   10/17/2023   Extended Plan of Care:  N/A   Progress Note: 10/5/2023     Time In:  4:15 PM  Time Out:  5:00 PM  Total Billable Time: 45 mins     Precautions: Standard and Fall  Subjective:   Patient reports: No major changes since previous session. Patient stated that he has had the  articulation problem all his life. "I talk too fast."   He was not compliant to home exercise program.   Response to previous treatment: fair  Pain Scale: no pain indicated throughout session  Objective:   UNTIMED  Procedure   Speech- Language- Voice Therapy     Short Term Goals: (4 weeks) Current Progress:   1. Patient will recall 4/4 clear speech strategies independently.      Met 10/10/2023   Patient recalled 4/4 strategies independently at the beginning of the session and end of the session.     2. Patient will use motor speech strategies when producing functional phrases (ex: food order) with 90% intelligibility to encourage carryover of motor speech strategies.      Progressing/  10/17/2023   Patient recited words with target phonemes in all position of words. He did better with sounds in initial position of words than compared to target sounds in medial and final positions and when target sounds were combined in words.  However he was able to produce correct production of /d/ and /t/ given less cues in words (initial and medial positions) and sentences today. Still needed cues for /k/ mostly in medial position of words (53% accuracy " independently, 100% accuracy given moderate cues). He correctly produced /k/ in initial position of words with 100% accuracy given minimum cues.     Sentences -   /k/ - 80% accuracy independently, 100% accuracy given cues.   /t/ - 90% accuracy independently, 100% accuracy given cues    Difficulty noted with producing /l/ and /l blends/.     Visual and verbal cues for tongue placement was helpful.     Limited carryover in conversation      3.  Patient will complete diaphragmatic breathing exercises by producing maximal exhalation via diaphragmic breathing with a duration of 5 seconds in 10 trials, consistently with minimal tactile and verbal clinician cues.    Progressing/ Reviewed purpose of breathing and speaking. Patient was aware to slow down, breath, pause.        4.  In order to promote generalization of intelligibility, patient will produce sentences during diaphragmatic breathing while incorporating intelligibility strategies (overarticulation, increased loudness) at 80% accuracy with moderate verbal and visual cues.      Progressing/  10/17/2023   Sentences -   /k/ - 80% accuracy independently, 100% accuracy given cues.   /t/ - 90% accuracy independently, 100% accuracy given cues    Difficulty noted with producing /l/ and /l blends/.     Visual and verbal cues for tongue placement was helpful.     Limited carryover in conversation       5. Patient will implement increased intonation, overarticulation, and reduced rate strategies to increase overall intelligibility for functional communication with 80% accuracy and minimal verbal cues for use of strategies.      Progressing/  10/17/2023   Patient recited words with target phonemes in all position of words. He did better with sounds in initial position of words than compared to target sounds in medial and final positions and when target sounds were combined in words.   Sentences -   /k/ - 90% accuracy independently, 100% accuracy given cues.   /t/ - 87%  accuracy independently, 100% accuracy given cues  /g/ - 100% accuracy independently     Minimal pairs- words (ie, die-jacques)  /g/ and /d/ - 30% accuracy independently, 90% accuracy given maximum visual and verbal cues  /t/ and /k/ - 45% accuracy independently, 100% accuracy given maximum visual and verbal cues for tongue placement.    Limited carryover in conversation     6. Patient will use motor speech strategies and answer questions in conversation with a self-rating of at least 3 on a 3 point scale ( 1 = same as before beginning therapy, 2 =better but not best, 3 =best possible outcome) on  9 /10 trials.        Progressing/ 10/17/2023   Reviewed strategies. He is aware of errors but maximum cues for tongue placement required.            Patient Education/Response:   Patient educated regarding the followin. Clear speech strategies SLAP  2. Diaphragmatic breathing  3. Auditory biofeedback  4. Programs at [facility] and [facility] to allow patient to continue speech therapy/articulation long term as needed    Home program established: yes-diaphragmatic breathing and clear speech strategies when reciting words and sentences. Patient given worksheets with pictures of target phonemes (t, d, k, g, l) and instructed to recite the words and put in sentences while recording himself.  Patient verbalized understanding to all above education provided.     See Electronic Medical Record under Patient Instructions for exercises provided throughout therapy.  Assessment:   Kenji actively participated in today's session which focused on introducing plan of care goals, education and instruction of clear speech strategies/exercises, and diaphragmatic breathing.   Patient's strengths are awareness of deficits and his motivation.  Limited carryover in conversation. Patient's current focus is on improving intelligibility during the production of specific sounds to carryover into functional phrases. Moderate verbal and visual  cues needed to remind patient of tongue placement for target phonemes in connected speech. Patient did complete the home exercise implemented in previous session. Speech Therapist reminded patient the importance of home exercise program to increase functional carryover over and improve overall speech intelligibility during activities of daily living. Kenji is progressing well towards his goals. Patient prognosis is Fair based on chronic condition and limited carryover in conversation. Patient may benefit from outpatient speech and language therapy to address the deficits at the [place] graduate level as an ongoing teaching case for graduate clinicians if he chooses to continue receiving speech therapy. Discharge from outpatient speech therapy at this facility due to maximum rehab potential.  Medical necessity is demonstrated by the following IMPAIRMENTS:  Motor speech: Decreased speech intelligibility results in decreased efficiency communicating medical and social wants and needs in the case of emergency.    Barriers to Therapy: No barriers to therapy identified.  Patient's spiritual, cultural and educational needs considered and patient agreeable to plan of care and goals.  Plan:   Discharge.    Recommendations: Referred to [facility] or [facility] for continued articulation therapy if patient desires. Flyers were provided to patient.    [facility] Contact: [email or web address]  [phone]    [facility] Speech Pathology Dept [phone]      SASHA Merdano., CCC-SLP, CBIS  Speech-Language Pathologist  Certified Brain Injury Specialist     10/17/2023

## 2023-10-17 NOTE — PROGRESS NOTES
[facility]   Physical Therapy Treatment Note     Name: Kenji Malone  Clinic Number: [id number]    Therapy Diagnosis:   Encounter Diagnoses   Name Primary?    Balance disorder Yes    Gait abnormality          Physician: Melanie Delgado MD    Visit Date: 10/17/2023    Physician Orders: PT Eval and Treat   Medical Diagnosis from Referral: Spinal Muscular Atrophy  Evaluation Date: 8/29/2023  Authorization Period Expiration: 12/31/2023  Plan of Care Expiration: 10/24/23  Progress Note Due: 10/24/23  Visit # / Visits authorized: 5/ 20 (+eval)  FOTO: 2/3     Precautions: Standard and Fall     Time In: 15:32  Time Out: 16:12   Total Billable Time: 40 minutes (Medicaid - TE only)    PTA Visit #: 0/5     Subjective     Patient reports: he is doing well with no new complaints.   He was partially compliant with home exercise program.  Response to previous treatment: good  Functional change: none    Pain: 0/10  Location: N/A    Objective      Objective Measures updated at progress report unless specified. Last taken on 10/10/2023.    Treatment     Kenji received the treatments listed below:      therapeutic exercises to develop strength, endurance, ROM, flexibility, posture, and core stabilization for 40 minutes including:    SciFit reciprocal stepper on L5 for [place], x8 min      Tall kneeling side-stepping along edge of mat performed L/R 3x each way while passing 3kg weighted med ball back/forth with PT   Sit on heels <> tall kneeling with explosive hip extension + raising 3kg overhead, 2x10     Quadruped + alternating upper extremity lift with hand tap to target, 2x20  Quadruped + alternating lower extremity raise, 2x10      Core strengthening exercises while seated EOM with feet unsupported and sitting on dynadisc:   - Chest presses with 3kg weighted med ball, 2x10  - Shoulder presses with 3kg weighted med ball, 2x10  - Russian twists with 3kg weighted med ball, 2x10  - Partial range  sit-ups (reclined on wedge) to short sit EOM while holding 3kg weighted med ball, 2x10     LE strengthening:  - prone hamstring curls with 1.5# ankle weights, 2x10/leg ~min A on left lower extremity as needed  - seated hip flexion (alternating marches) with 1.5# ankle weights, 2x10/leg       Patient Education and Home Exercises       Education provided:   - home exercise program issued and reviewed    Written Home Exercises Provided: Patient instructed to cont prior HEP. Exercises were reviewed and Kenji was able to demonstrate them prior to the end of the session.  Kenji demonstrated good  understanding of the education provided. See Electronic Medical Record under Patient Instructions for exercises provided during therapy sessions    Assessment     Joshua continues to improve with both core strength and proximal stability strength. While he still has trouble engaging his serratus anterior during quadruped activities, he does not shift his weight back towards his heels as much. In addition, he was able to tolerate addition of weight during partial sit-ups without significant fatigue noted. Overall, he is appropriately progressing in neuro PT and will continue to benefit from skilled PT services to maximize endurance and independence and safety with functional mobility tasks.     Kenji Is progressing well towards his goals.   Patient prognosis is Fair.     Patient will continue to benefit from skilled outpatient physical therapy to address the deficits listed in the problem list box on initial evaluation, provide pt/family education and to maximize pt's level of independence in the home and community environment.     Patient's spiritual, cultural and educational needs considered and pt agreeable to plan of care and goals.     Anticipated barriers to physical therapy: pt does not drive, chronicity of condition    Goals:   Short Term Goals: 4 weeks   Pt will be issued first installment of HEP and report at least  partial compliance~ MET, 10/10/23   Pt will improve his B resting knee extension by 2-3 degrees B to allow for improved standing posture and gait fluidity ~ MET, 10/10/23  Pt will improve postural control with MCTSIB condition 4 score of at least 10s for decreased fall risk with standing ADLs~ MET, 10/10/23  Pt to perform 6 minute walk test for full 6 minutes before stopping to improve gait speed & endurance ~  MET, 10/10/23  Pt to improve his 5x sit<>stand test to < 12 seconds for decreased overall fall risk ~ MET, 10/10/23  Pt to improve his SSWS to 1.0 m/sec for improved community ambulation and decreased fall risk Ongoing   Pt will improve his MMT grades for knee flexion OR hip abduction by 1/3 for improved stability and gait mechanics ~ MET, 10/10/23     Long Term Goals: 8 weeks   Pt will be independent and at least partially compliant with finalized HEP to help maintain potential gains realized in PT Ongoing   Pt will improve his B resting knee extension by 4-5 degrees B to allow for improved standing posture and gait fluidity~MET, 10/10/23  Pt will improve his SSWS to 1.2 m/sec for improved community ambulation and decreased fall risk Ongoing   Pt will improve his MMT grades for knee flexion AND hip abduction by 1/3 each for improved stability and gait mechanics Ongoing and progressing  Pt will improve postural control with MCTSIB condition 4 score of at least 22s for decreased fall risk with standing ADLs ~ MET, 10/10/23   Pt to perform 6 minute walk test for 800 feet or greater to improve gait speed & endurance ~ MET, 10/10/23 ~ Revise this goal to 7534-0351 feet    Plan     Continue PT plan of care with a focus on lower extremity strengthening, improving flexibility, core/balance training, and gait as appropriate    Laly Fagan, PT   10/17/2023

## 2023-10-20 ENCOUNTER — TELEPHONE (OUTPATIENT)
Dept: NEUROSURGERY | Facility: CLINIC | Age: 18
End: 2023-10-20
Payer: MEDICAID

## 2023-10-24 ENCOUNTER — CLINICAL SUPPORT (OUTPATIENT)
Dept: REHABILITATION | Facility: HOSPITAL | Age: 18
End: 2023-10-24
Payer: MEDICAID

## 2023-10-24 DIAGNOSIS — M25.662 DECREASED RANGE OF MOTION OF BOTH KNEES: Primary | ICD-10-CM

## 2023-10-24 DIAGNOSIS — M25.661 DECREASED RANGE OF MOTION OF BOTH KNEES: Primary | ICD-10-CM

## 2023-10-24 DIAGNOSIS — R26.9 GAIT ABNORMALITY: ICD-10-CM

## 2023-10-24 PROCEDURE — 97110 THERAPEUTIC EXERCISES: CPT | Mod: PO

## 2023-10-24 NOTE — PLAN OF CARE
[facility]   Physical Therapy Treatment Note and Plan of Care Reassessment     Name: Kenji Malone  Clinic Number: [id number]    Therapy Diagnosis:   Encounter Diagnoses   Name Primary?    Decreased range of motion of both knees Yes    Gait abnormality            Physician: Melanie Delgado MD    Visit Date: 10/24/2023    Physician Orders: PT Eval and Treat   Medical Diagnosis from Referral: Spinal Muscular Atrophy  Evaluation Date: 8/29/2023  Authorization Period Expiration: 12/31/2023  Plan of Care Expiration: 10/24/23  Updated Plan of Care Expiration: 12/19/23  Progress Note Due: 9/29/23  Visit # / Visits authorized: 6/ 20 (+eval)  FOTO: 2/3     Precautions: Standard and Fall     Time In: 1529    Time Out: 1614    Total Billable Time: 45   minutes (Medicaid - TE only)    PTA Visit #: 0/5     Subjective     Patient reports: he has a healing wound to the medial aspect of his R foot. He feels he developed this while doing a lot of walking in [place]. His AFO cracked and this caused pressure to his foot. He has seen his orthotist at [facility] and is awaiting new AFOs( about 2 weeks from now, per pt report.)  He was partially compliant with home exercise program  Response to previous treatment: good  Functional change: none    Pain: 0/10  Location: N/A    Objective      Pt arrives to today's appointment wearing different AFOs and a [place] walking boot to R LE.    Objective Measures updated at progress report unless specified. See below:    Eval  ROM:   GROSS AROM/PROM    LOWER EXTREMITY~ tested in supine  (R) LE: limited as follows: R hip rests in 20 degrees flexion; R knee rests in 35 degrees flexion  (L) LE: limited as follows: L hip rests in 20 degrees flexion; L knee rests in 25 degrees flexion    10/10/23  ROM:   GROSS AROM/PROM    LOWER EXTREMITY~ tested in supine  (R) LE: limited as follows: R hip rests in 10 degrees flexion; R knee rests in 25 degrees flexion  (L) LE: limited as  follows: L hip rests in 10 degrees flexion; L knee rests in 22 degrees flexion    10/24/23  ROM:   GROSS AROM/PROM    LOWER EXTREMITY~ tested in supine  (R) LE: limited as follows: R hip rests in 18 degrees flexion; R knee rests in 29 degrees flexion  (L) LE: limited as follows: L hip rests in 25 degrees flexion; L knee rests in 25 degrees flexion     Lower Extremity Strength~ tested in sitting  Right LE eval  10/10/23 10/24/23 Left LE eval  10/10/23 10/24/23   Hip flexion:  5/5 5/5 5/5 Hip flexion: 5/5 5/5 5/5   Knee extension: 4/5 4+/5 5/5 Knee extension: 4/5 4+/5 5/5   Knee flexion: 3+/5 to 4-/5 5/5 4+/5 Knee flexion: 3/5 3+/5 3/5   Ankle dorsiflexion:  1+/5 1+/5 1+/5 Ankle dorsiflexion: 0/5 0/5 trace   Ankle plantarflexion:  1+/5 1/5 1/5 Ankle plantarflexion: 0/5 1/5 trace   Hip abduction: 3+/5* 3+/5* 3+/5 to 4-/5* Hip abduction: 3+/5* 3+5* 3+/5*   Hip adduction: 5/5 5/5 5/5 Hip adduction 5/5 5/5 5/5   Hip extension: 4/5 3+/5 4/5 Hip extension: 4/5 4+/5 5/5   *within ROM        Evaluation 10/10/23 10/24/23   Single Limb Stance R LE NT  (<10 sec = HIGH FALL RISK) NT  (<10 sec = HIGH FALL RISK) NT  (<10 sec = HIGH FALL RISK)   Single Limb Stance L LE NT  (<10 sec = HIGH FALL RISK) NT  (<10 sec = HIGH FALL RISK) NT  (<10 sec = HIGH FALL RISK)   30 second Chair Rise NT NT NT   5 times sit to stand 12 seconds, no arms 11 seconds, no arms 10 seconds, no arms   TUG 10 seconds 12 seconds NT   Self selected walking speed 0.86 m/sec (6m/7s) 0.86 m/sec (6m/7s) NT   Fast walking speed NT NT NT         5x sit to stand normative information:   >12 sec= fall risk for general elderly  >16 sec= fall risk for Parkinson's disease  >10 sec= balance/vestibular dysfunction (<61 y/o)  >14.2 sec= balance/vestibular dysfunction (>61 y/o)  >12 sec= fall risk for CVA      Postural control: MCTSIB: 09/05/2023 10/10/23 10/24/23   1. Eyes Open/feet together/Firm:  30 seconds 30 seconds 30 seconds   2. Eyes Closed/feet together/Firm:  30  "seconds 30 seconds~ very slight sway 30 seconds~ multiple trials need to pass   3. Eyes Open/feet together/Foam:  30 seconds - min sway 30 seconds~ 2 attempts to pass 30 seconds~ 1 attempt to pass   4. Eyes Closed/feet together/Foam:  2 seconds 30 seconds~ min sway and increased knee flexion 8 seconds~ multiple attempts made      9/5/23  6-Minute Walk Test  Assistive Device: none  Distance: 670 ft (had to sit after 4 minutes)      10/10/23  6-Minute Walk Test  Assistive Device: none  Distance: 1013 ft      10/24/23  6-Minute Walk Test  Assistive Device: none  Distance: 922 ft            Treatment     Kenji received the treatments listed below:      therapeutic exercises to develop strength, endurance, ROM, flexibility, posture, and core stabilization for 43 minutes including:  (Includes time spent on tests and measures)    Supine on mat:  X 2 minutes of manual down pressure to B distal femurs with LEs resting on foam roller(hamstring stretch)    Side lying on mat:  2 x 30" B manual hip flexor stretches      Patient Education and Home Exercises       Education provided:   - home exercise program issued and reviewed  -10/24/23: PT provided pt with a schedule slip for the next 8 weeks to cover his new plan of care extension.    Written Home Exercises Provided: Patient instructed to cont prior HEP. Exercises were reviewed and Kenji was able to demonstrate them prior to the end of the session.  Kenji demonstrated good  understanding of the education provided. See Electronic Medical Record under Patient Instructions for exercises provided during therapy sessions    Assessment     Joshua tolerated today's session well for his plan of care reassessment. This comes only 2 weeks after his monthly reassessment on 10/10/23. The initial reassessment was delayed due to pt being out of town visiting his family. With regard to today's testing, Joshua demonstrated a few improvements. His R LE MMT scores were better for knee " extension, hip abduction and hip extension. His L LE scores for hip and knee extension, as well as ankle dorsiflexion/plantarflexion also improved from the prior assessment.  His 5 x sit<> stand score improved by one second again. He was unable to pass condition # 4 of the mCTSIB and his 6 minute walk test was 91 feet less than previous. PT feels that this has to do with pt wearing different AFOs and a walking boot to his R LE. He is making progress with general activity tolerance but remains limited by decreased muscular strength and endurance. He remains appropriate for outpatient neuro PT to address mobility deficits, improve motor control, and increase independence. PT feels more manual stretching of hip flexors and hamstrings would be helpful, considering pt's LE ROM measurements declined from the previous assessment. PT would like to extend pt's current plan of care for another 8 weeks(until 12/19/23) to address remaining deficits. See below for most recent comments regarding goal achievement and any revisions made.    Kenji Is progressing well towards his goals.   Patient prognosis is Fair.     Patient will continue to benefit from skilled outpatient physical therapy to address the deficits listed in the problem list box on initial evaluation, provide pt/family education and to maximize pt's level of independence in the home and community environment.     Patient's spiritual, cultural and educational needs considered and pt agreeable to plan of care and goals.     Anticipated barriers to physical therapy: pt does not drive, chronicity of condition    Goals:   Short Term Goals: 4 weeks   Pt will be issued first installment of HEP and report at least partial compliance~ MET, 10/10/23   Pt will improve his B resting knee extension by 2-3 degrees B to allow for improved standing posture and gait fluidity ~ MET, 10/10/23, NOT MET, 10/24/23  Pt will improve postural control with MCTSIB condition 4 score of at  least 10s for decreased fall risk with standing ADLs~ MET, 10/10/23, NOT MET, 10/24/23  Pt to perform 6 minute walk test for full 6 minutes before stopping to improve gait speed & endurance ~  MET, 10/10/23  Pt to improve his 5x sit<>stand test to < 12 seconds for decreased overall fall risk ~ MET, 10/10/23  Pt to improve his SSWS to 1.0 m/sec for improved community ambulation and decreased fall risk Ongoing   Pt will improve his MMT grades for knee flexion OR hip abduction by 1/3 for improved stability and gait mechanics ~ MET, 10/10/23, partially MET, 10/24/23     Long Term Goals: 8 weeks   Pt will be independent and at least partially compliant with finalized HEP to help maintain potential gains realized in PT Ongoing   Pt will improve his B resting knee extension by 4-5 degrees B to allow for improved standing posture and gait fluidity~MET, 10/10/23, NOT MET, 10/24/23  Pt will improve his SSWS to 1.2 m/sec for improved community ambulation and decreased fall risk Ongoing   Pt will improve his MMT grades for knee flexion AND hip abduction by 1/3 each for improved stability and gait mechanics Ongoing and progressing  Pt will improve postural control with MCTSIB condition 4 score of at least 22s for decreased fall risk with standing ADLs ~ MET, 10/10/23, NOT MET, 10/24/23   Pt to perform 6 minute walk test for 800 feet or greater to improve gait speed & endurance ~ MET, 10/10/23 ~ Revise this goal to 2384-8499 feet~ ongoing    Plan     Continue outpatient physical therapy 2 x weekly under updated Plan of Care, 10/24/23 to 12/19/23, with treatment to include: pt education, HEP, therapeutic exercises, neuromuscular re-education/balance exercises, therapeutic activities, joint mobilizations, and modalities PRN, to work towards established goals. Pt may be seen by PTA to carry out plan of care.       Continue PT plan of care with a focus on lower extremity strengthening, improving flexibility, core/balance training,  and gait as appropriate    David Frankel, PT   10/24/2023

## 2023-10-24 NOTE — PROGRESS NOTES
[facility]   Physical Therapy Treatment Note and Plan of Care Reassessment     Name: Kenji Malone  Clinic Number: [id number]    Therapy Diagnosis:   Encounter Diagnoses   Name Primary?    Decreased range of motion of both knees Yes    Gait abnormality            Physician: Melanie Delgado MD    Visit Date: 10/24/2023    Physician Orders: PT Eval and Treat   Medical Diagnosis from Referral: Spinal Muscular Atrophy  Evaluation Date: 8/29/2023  Authorization Period Expiration: 12/31/2023  Plan of Care Expiration: 10/24/23  Updated Plan of Care Expiration: 12/19/23  Progress Note Due: 9/29/23  Visit # / Visits authorized: 6/ 20 (+eval)  FOTO: 2/3     Precautions: Standard and Fall     Time In: 1529    Time Out: 1614    Total Billable Time: 45   minutes (Medicaid - TE only)    PTA Visit #: 0/5     Subjective     Patient reports: he has a healing wound to the medial aspect of his R foot. He feels he developed this while doing a lot of walking in [place]. His AFO cracked and this caused pressure to his foot. He has seen his orthotist at \[facility]\"" and is awaiting new AFOs( about 2 weeks from now, per pt report.)  He was partially compliant with home exercise program  Response to previous treatment: good  Functional change: none    Pain: 0/10  Location: N/A    Objective      Pt arrives to today's appointment wearing different AFOs and a Darco walking boot to R LE.    Objective Measures updated at progress report unless specified. See below:    Eval  ROM:   GROSS AROM/PROM    LOWER EXTREMITY~ tested in supine  (R) LE: limited as follows: R hip rests in 20 degrees flexion; R knee rests in 35 degrees flexion  (L) LE: limited as follows: L hip rests in 20 degrees flexion; L knee rests in 25 degrees flexion    10/10/23  ROM:   GROSS AROM/PROM    LOWER EXTREMITY~ tested in supine  (R) LE: limited as follows: R hip rests in 10 degrees flexion; R knee rests in 25 degrees flexion  (L) LE: limited as  follows: L hip rests in 10 degrees flexion; L knee rests in 22 degrees flexion    10/24/23  ROM:   GROSS AROM/PROM    LOWER EXTREMITY~ tested in supine  (R) LE: limited as follows: R hip rests in 18 degrees flexion; R knee rests in 29 degrees flexion  (L) LE: limited as follows: L hip rests in 25 degrees flexion; L knee rests in 25 degrees flexion     Lower Extremity Strength~ tested in sitting  Right LE eval  10/10/23 10/24/23 Left LE eval  10/10/23 10/24/23   Hip flexion:  5/5 5/5 5/5 Hip flexion: 5/5 5/5 5/5   Knee extension: 4/5 4+/5 5/5 Knee extension: 4/5 4+/5 5/5   Knee flexion: 3+/5 to 4-/5 5/5 4+/5 Knee flexion: 3/5 3+/5 3/5   Ankle dorsiflexion:  1+/5 1+/5 1+/5 Ankle dorsiflexion: 0/5 0/5 trace   Ankle plantarflexion:  1+/5 1/5 1/5 Ankle plantarflexion: 0/5 1/5 trace   Hip abduction: 3+/5* 3+/5* 3+/5 to 4-/5* Hip abduction: 3+/5* 3+5* 3+/5*   Hip adduction: 5/5 5/5 5/5 Hip adduction 5/5 5/5 5/5   Hip extension: 4/5 3+/5 4/5 Hip extension: 4/5 4+/5 5/5   *within ROM        Evaluation 10/10/23 10/24/23   Single Limb Stance R LE NT  (<10 sec = HIGH FALL RISK) NT  (<10 sec = HIGH FALL RISK) NT  (<10 sec = HIGH FALL RISK)   Single Limb Stance L LE NT  (<10 sec = HIGH FALL RISK) NT  (<10 sec = HIGH FALL RISK) NT  (<10 sec = HIGH FALL RISK)   30 second Chair Rise NT NT NT   5 times sit to stand 12 seconds, no arms 11 seconds, no arms 10 seconds, no arms   TUG 10 seconds 12 seconds NT   Self selected walking speed 0.86 m/sec (6m/7s) 0.86 m/sec (6m/7s) NT   Fast walking speed NT NT NT         5x sit to stand normative information:   >12 sec= fall risk for general elderly  >16 sec= fall risk for Parkinson's disease  >10 sec= balance/vestibular dysfunction (<59 y/o)  >14.2 sec= balance/vestibular dysfunction (>59 y/o)  >12 sec= fall risk for CVA      Postural control: MCTSIB: 09/05/2023 10/10/23 10/24/23   1. Eyes Open/feet together/Firm:  30 seconds 30 seconds 30 seconds   2. Eyes Closed/feet together/Firm:  30  "seconds 30 seconds~ very slight sway 30 seconds~ multiple trials need to pass   3. Eyes Open/feet together/Foam:  30 seconds - min sway 30 seconds~ 2 attempts to pass 30 seconds~ 1 attempt to pass   4. Eyes Closed/feet together/Foam:  2 seconds 30 seconds~ min sway and increased knee flexion 8 seconds~ multiple attempts made      9/5/23  6-Minute Walk Test  Assistive Device: none  Distance: 670 ft (had to sit after 4 minutes)      10/10/23  6-Minute Walk Test  Assistive Device: none  Distance: 1013 ft      10/24/23  6-Minute Walk Test  Assistive Device: none  Distance: 922 ft            Treatment     Kenji received the treatments listed below:      therapeutic exercises to develop strength, endurance, ROM, flexibility, posture, and core stabilization for 43 minutes including:  (Includes time spent on tests and measures)    Supine on mat:  X 2 minutes of manual down pressure to B distal femurs with LEs resting on foam roller(hamstring stretch)    Side lying on mat:  2 x 30" B manual hip flexor stretches      Patient Education and Home Exercises       Education provided:   - home exercise program issued and reviewed  -10/24/23: PT provided pt with a schedule slip for the next 8 weeks to cover his new plan of care extension.    Written Home Exercises Provided: Patient instructed to cont prior HEP. Exercises were reviewed and Kenji was able to demonstrate them prior to the end of the session.  Kenji demonstrated good  understanding of the education provided. See Electronic Medical Record under Patient Instructions for exercises provided during therapy sessions    Assessment     Joshua tolerated today's session well for his plan of care reassessment. This comes only 2 weeks after his monthly reassessment on 10/10/23. The initial reassessment was delayed due to pt being out of town visiting his family. With regard to today's testing, Joshua demonstrated a few improvements. His R LE MMT scores were better for knee " extension, hip abduction and hip extension. His L LE scores for hip and knee extension, as well as ankle dorsiflexion/plantarflexion also improved from the prior assessment.  His 5 x sit<> stand score improved by one second again. He was unable to pass condition # 4 of the mCTSIB and his 6 minute walk test was 91 feet less than previous. PT feels that this has to do with pt wearing different AFOs and a walking boot to his R LE. He is making progress with general activity tolerance but remains limited by decreased muscular strength and endurance. He remains appropriate for outpatient neuro PT to address mobility deficits, improve motor control, and increase independence. PT feels more manual stretching of hip flexors and hamstrings would be helpful, considering pt's LE ROM measurements declined from the previous assessment. PT would like to extend pt's current plan of care for another 8 weeks(until 12/19/23) to address remaining deficits. See below for most recent comments regarding goal achievement and any revisions made.    Kenji Is progressing well towards his goals.   Patient prognosis is Fair.     Patient will continue to benefit from skilled outpatient physical therapy to address the deficits listed in the problem list box on initial evaluation, provide pt/family education and to maximize pt's level of independence in the home and community environment.     Patient's spiritual, cultural and educational needs considered and pt agreeable to plan of care and goals.     Anticipated barriers to physical therapy: pt does not drive, chronicity of condition    Goals:   Short Term Goals: 4 weeks   Pt will be issued first installment of HEP and report at least partial compliance~ MET, 10/10/23   Pt will improve his B resting knee extension by 2-3 degrees B to allow for improved standing posture and gait fluidity ~ MET, 10/10/23, NOT MET, 10/24/23  Pt will improve postural control with MCTSIB condition 4 score of at  least 10s for decreased fall risk with standing ADLs~ MET, 10/10/23, NOT MET, 10/24/23  Pt to perform 6 minute walk test for full 6 minutes before stopping to improve gait speed & endurance ~  MET, 10/10/23  Pt to improve his 5x sit<>stand test to < 12 seconds for decreased overall fall risk ~ MET, 10/10/23  Pt to improve his SSWS to 1.0 m/sec for improved community ambulation and decreased fall risk Ongoing   Pt will improve his MMT grades for knee flexion OR hip abduction by 1/3 for improved stability and gait mechanics ~ MET, 10/10/23, partially MET, 10/24/23     Long Term Goals: 8 weeks   Pt will be independent and at least partially compliant with finalized HEP to help maintain potential gains realized in PT Ongoing   Pt will improve his B resting knee extension by 4-5 degrees B to allow for improved standing posture and gait fluidity~MET, 10/10/23, NOT MET, 10/24/23  Pt will improve his SSWS to 1.2 m/sec for improved community ambulation and decreased fall risk Ongoing   Pt will improve his MMT grades for knee flexion AND hip abduction by 1/3 each for improved stability and gait mechanics Ongoing and progressing  Pt will improve postural control with MCTSIB condition 4 score of at least 22s for decreased fall risk with standing ADLs ~ MET, 10/10/23, NOT MET, 10/24/23   Pt to perform 6 minute walk test for 800 feet or greater to improve gait speed & endurance ~ MET, 10/10/23 ~ Revise this goal to 2658-5286 feet~ ongoing    Plan     Continue outpatient physical therapy 2 x weekly under updated Plan of Care, 10/24/23 to 12/19/23, with treatment to include: pt education, HEP, therapeutic exercises, neuromuscular re-education/balance exercises, therapeutic activities, joint mobilizations, and modalities PRN, to work towards established goals. Pt may be seen by PTA to carry out plan of care.       Continue PT plan of care with a focus on lower extremity strengthening, improving flexibility, core/balance training,  and gait as appropriate    David Frankel, PT   10/24/2023

## 2023-10-26 ENCOUNTER — CLINICAL SUPPORT (OUTPATIENT)
Dept: REHABILITATION | Facility: HOSPITAL | Age: 18
End: 2023-10-26
Payer: MEDICAID

## 2023-10-26 DIAGNOSIS — M25.661 DECREASED RANGE OF MOTION OF BOTH KNEES: Primary | ICD-10-CM

## 2023-10-26 DIAGNOSIS — M25.662 DECREASED RANGE OF MOTION OF BOTH KNEES: Primary | ICD-10-CM

## 2023-10-26 DIAGNOSIS — R26.9 GAIT ABNORMALITY: ICD-10-CM

## 2023-10-26 PROCEDURE — 97110 THERAPEUTIC EXERCISES: CPT | Mod: PO

## 2023-10-26 NOTE — PROGRESS NOTES
"[facility]   Physical Therapy Treatment Note      Name: Kenji Malone  Clinic Number: [id number]    Therapy Diagnosis:   Encounter Diagnoses   Name Primary?    Decreased range of motion of both knees Yes    Gait abnormality              Physician: Melanie Delgado MD    Visit Date: 10/26/2023    Physician Orders: PT Eval and Treat   Medical Diagnosis from Referral: Spinal Muscular Atrophy  Evaluation Date: 8/29/2023  Authorization Period Expiration: 12/31/2023  Plan of Care Expiration: 10/24/23  Updated Plan of Care Expiration: 12/19/23  Progress Note Due: 9/29/23  Visit # / Visits authorized: 7/ 20 (+eval)  FOTO: 2/3     Precautions: Standard and Fall     Time In: 1513    Time Out: 1558     Total Billable Time: 45    minutes (Medicaid - TE only)    PTA Visit #: 0/5     Subjective     Patient reports: he gave the schedule slip to his mom but she has yet to schedule additional visits.  He was partially compliant with home exercise program  Response to previous treatment: good  Functional change: none    Pain: 0/10  Location: N/A    Objective      Pt arrives to today's appointment wearing different AFOs and a Darco walking boot to R LE.    Objective Measures updated at progress report unless specified. See below:    TUG: 10 seconds    SSWS: 1.0 m/sec(6m/6sec)         Treatment     Kenji received the treatments listed below:      therapeutic exercises to develop strength, endurance, ROM, flexibility, posture, and core stabilization for 45 minutes including:      Supine on mat:  X 3 minutes of manual down pressure to B distal femurs with LEs resting on foam roller(hamstring stretch)  2 x 30" B manual hamstring stretches  2 x 10 AA B hip and knee flex/ext with manual resistance during extension phase  2 x 10 clams against resistance of green theraband~ added to HEP  2 x 10 B bridges while holding 3 kg ball overhead~ cues for elbow extension    Side lying on mat:  2 x 30" B manual hip " flexor stretches        Sit on heels <> tall kneeling with explosive hip extension + throwing 3kg weighted med ball to PT, 2 x 10       Quadruped bird dogs, 2 x 5 reps, CGA for safety~ yoga mat placed to prevent knees from slipping       Core strengthening exercises while seated EOM with feet unsupported:  - Chest presses with 3kg weighted med ball, 2 x 10  - Shoulder presses with 3kg weighted med ball, 2 x 10  - Russian twists with 3kg weighted med ball, 2 x 10  - Full-range sit-ups to short sit EOM, 10 x ~ min A needed only on last 3 reps~ cues for erect trunk and anterior pelvic tilt upon achieving sitting position     LE strengthening:  -prone hamstring curls with no weight added, 1 x 10/leg  - prone hamstring curls with 1.5 # ankle weights, 1 x 10/leg ~min A on left lower extremity only on last 3 reps    Patient Education and Home Exercises       Education provided:   - home exercise program issued and reviewed  -10/24/23: PT provided pt with a schedule slip for the next 8 weeks to cover his new plan of care extension.    Written Home Exercises Provided: Patient instructed to cont prior HEP. Exercises were reviewed and Kenji was able to demonstrate them prior to the end of the session.  Kenji demonstrated good  understanding of the education provided. See Electronic Medical Record under Patient Instructions for exercises provided during therapy sessions    Assessment     Joshua continues to improve with both core strength and proximal stability strength. PT continues to address hip and knee ROM deficits via manual stretching. We added supine clams to HEP for hip abductor strengthening and pt was appropriately challenged with green theraband. Joshua progressed to full bird dogs in quadruped and this was very challenging for him( PT only had him complete 2 x 5 reps here.) PT also had pt perform TUG and SSWS; pt achieved STG for SSWS(self-selected walking speed) based on today's performance.  Overall, he is  progressing in neuro PT and will continue to benefit from skilled PT services to maximize endurance, independence and safety with functional mobility tasks. His mother has not yet scheduled additional visits.        Kenji Is progressing well towards his goals.   Patient prognosis is Fair.     Patient will continue to benefit from skilled outpatient physical therapy to address the deficits listed in the problem list box on initial evaluation, provide pt/family education and to maximize pt's level of independence in the home and community environment.     Patient's spiritual, cultural and educational needs considered and pt agreeable to plan of care and goals.     Anticipated barriers to physical therapy: pt does not drive, chronicity of condition    Goals:   Short Term Goals: 4 weeks   Pt will be issued first installment of HEP and report at least partial compliance~ MET, 10/10/23   Pt will improve his B resting knee extension by 2-3 degrees B to allow for improved standing posture and gait fluidity ~ MET, 10/10/23, NOT MET, 10/24/23  Pt will improve postural control with MCTSIB condition 4 score of at least 10s for decreased fall risk with standing ADLs~ MET, 10/10/23, NOT MET, 10/24/23  Pt to perform 6 minute walk test for full 6 minutes before stopping to improve gait speed & endurance ~  MET, 10/10/23  Pt to improve his 5x sit<>stand test to < 12 seconds for decreased overall fall risk ~ MET, 10/10/23  Pt to improve his SSWS to 1.0 m/sec for improved community ambulation and decreased fall risk~ MET, 10/26/23  Pt will improve his MMT grades for knee flexion OR hip abduction by 1/3 for improved stability and gait mechanics ~ MET, 10/10/23, partially MET, 10/24/23     Long Term Goals: 8 weeks   Pt will be independent and at least partially compliant with finalized HEP to help maintain potential gains realized in PT Ongoing   Pt will improve his B resting knee extension by 4-5 degrees B to allow for improved  standing posture and gait fluidity~MET, 10/10/23, NOT MET, 10/24/23  Pt will improve his SSWS to 1.2 m/sec for improved community ambulation and decreased fall risk Ongoing   Pt will improve his MMT grades for knee flexion AND hip abduction by 1/3 each for improved stability and gait mechanics Ongoing and progressing  Pt will improve postural control with MCTSIB condition 4 score of at least 22s for decreased fall risk with standing ADLs ~ MET, 10/10/23, NOT MET, 10/24/23   Pt to perform 6 minute walk test for 800 feet or greater to improve gait speed & endurance ~ MET, 10/10/23 ~ Revise this goal to 8533-8735 feet~ ongoing    Plan       Continue PT plan of care with a focus on lower extremity strengthening, improving flexibility, core/balance training, and gait as appropriate    David Frankel, PT   10/26/2023

## 2023-10-31 ENCOUNTER — TELEPHONE (OUTPATIENT)
Dept: NEUROSURGERY | Facility: CLINIC | Age: 18
End: 2023-10-31
Payer: MEDICAID

## 2023-10-31 NOTE — TELEPHONE ENCOUNTER
----- Message from Tonja Meeks sent at 10/31/2023  1:24 PM CDT -----  Regarding: Appt  Contact: Pt [phone]  Pt is calling to reschedule a appt please call

## 2023-10-31 NOTE — TELEPHONE ENCOUNTER
Unable to reach pt by phone, no way to leave message. Appointmnt rsld to next available and in the mail.   No indicators present

## 2023-11-14 ENCOUNTER — CLINICAL SUPPORT (OUTPATIENT)
Dept: REHABILITATION | Facility: HOSPITAL | Age: 18
End: 2023-11-14
Payer: MEDICAID

## 2023-11-14 DIAGNOSIS — R26.9 GAIT ABNORMALITY: ICD-10-CM

## 2023-11-14 DIAGNOSIS — M25.661 DECREASED RANGE OF MOTION OF BOTH KNEES: Primary | ICD-10-CM

## 2023-11-14 DIAGNOSIS — M25.662 DECREASED RANGE OF MOTION OF BOTH KNEES: Primary | ICD-10-CM

## 2023-11-14 PROCEDURE — 97110 THERAPEUTIC EXERCISES: CPT | Mod: PO

## 2023-11-14 NOTE — PROGRESS NOTES
[facility]   Physical Therapy Treatment Note      Name: Kenji Malone  Clinic Number: [id number]    Therapy Diagnosis:   Encounter Diagnoses   Name Primary?    Decreased range of motion of both knees Yes    Gait abnormality        Physician: Melanie Delgado MD    Visit Date: 11/14/2023    Physician Orders: PT Eval and Treat   Medical Diagnosis from Referral: Spinal Muscular Atrophy  Evaluation Date: 8/29/2023  Authorization Period Expiration: 12/31/2023  Plan of Care Expiration: 10/24/23  Updated Plan of Care Expiration: 12/19/23  Progress Note Due: 11/24/23  Visit # / Visits authorized: 8/ 20 (+eval)  FOTO: 2/3     Precautions: Standard and Fall     Time In: 15:30  Time Out: 16:14    Total Billable Time: 44 minutes (Medicaid - TE only)    PTA Visit #: 0/5     Subjective     Patient reports: he is still wearing his Darco boot but his pressure point has healed and is doing a lot better.   He was partially compliant with home exercise program  Response to previous treatment: good  Functional change: none    Pain: 0/10  Location: N/A    Objective      Pt arrives to today's appointment wearing different AFOs and a Darco walking boot to R LE.    Objective Measures updated at progress report unless specified.     Treatment     Kenji received the treatments listed below:      therapeutic exercises to develop strength, endurance, ROM, flexibility, posture, and core stabilization for 44 minutes including:    SciFit reciprocal stepper on L5 for [place], x8 min      Proximal strengthening/stabilization:   - Sit on heels <> tall kneeling with explosive hip extension + throwing 3kg weighted med ball to PT, 2 x 10  - Quadruped bird dogs, 2 x 10 reps  - SL bridges with foot on dome side of small BOSU,  2x10/leg     Core strengthening exercises while seated EOM on dome side of small BOSU with feet unsupported:  - Chest presses with 3kg weighted med ball, 2 x 10  - Shoulder presses with 3kg  weighted med ball, 2 x 10  - Russian twists with 3kg weighted med ball, 2 x 10    Full-range sit-ups to short sit EOM, 2x10 - min A for second set to achieve full sit-up      LE strengthening:  - prone hamstring curls with 1.5 # ankle weights, 2x10/leg  - leg press machine @ 170#, 2 x 10     Patient Education and Home Exercises       Education provided:   - home exercise program issued and reviewed  -10/24/23: PT provided pt with a schedule slip for the next 8 weeks to cover his new plan of care extension.    Written Home Exercises Provided: Patient instructed to cont prior HEP. Exercises were reviewed and Kenji was able to demonstrate them prior to the end of the session.  Kenji demonstrated good  understanding of the education provided. See Electronic Medical Record under Patient Instructions for exercises provided during therapy sessions    Assessment     Joshua demonstrate better  proximal stabilization as he was able to maintain more neutral hip positioning with bird dogs today. He also tolerated addition of heavy leg presses this session with appropriate full range of motion and no issue with fatigue. He remains limited by overall range of motion in bilateral lower extremities but strength appears to be improving. Consider heavier med ball for core strengthening,    Kenji Is progressing well towards his goals.   Patient prognosis is Fair.     Patient will continue to benefit from skilled outpatient physical therapy to address the deficits listed in the problem list box on initial evaluation, provide pt/family education and to maximize pt's level of independence in the home and community environment.     Patient's spiritual, cultural and educational needs considered and pt agreeable to plan of care and goals.     Anticipated barriers to physical therapy: pt does not drive, chronicity of condition    Goals:   Short Term Goals: 4 weeks   Pt will be issued first installment of HEP and report at least partial  compliance~ MET, 10/10/23   Pt will improve his B resting knee extension by 2-3 degrees B to allow for improved standing posture and gait fluidity ~ MET, 10/10/23, NOT MET, 10/24/23  Pt will improve postural control with MCTSIB condition 4 score of at least 10s for decreased fall risk with standing ADLs~ MET, 10/10/23, NOT MET, 10/24/23  Pt to perform 6 minute walk test for full 6 minutes before stopping to improve gait speed & endurance ~  MET, 10/10/23  Pt to improve his 5x sit<>stand test to < 12 seconds for decreased overall fall risk ~ MET, 10/10/23  Pt to improve his SSWS to 1.0 m/sec for improved community ambulation and decreased fall risk~ MET, 10/26/23  Pt will improve his MMT grades for knee flexion OR hip abduction by 1/3 for improved stability and gait mechanics ~ MET, 10/10/23, partially MET, 10/24/23     Long Term Goals: 8 weeks   Pt will be independent and at least partially compliant with finalized HEP to help maintain potential gains realized in PT Ongoing   Pt will improve his B resting knee extension by 4-5 degrees B to allow for improved standing posture and gait fluidity~MET, 10/10/23, NOT MET, 10/24/23  Pt will improve his SSWS to 1.2 m/sec for improved community ambulation and decreased fall risk Ongoing   Pt will improve his MMT grades for knee flexion AND hip abduction by 1/3 each for improved stability and gait mechanics Ongoing and progressing  Pt will improve postural control with MCTSIB condition 4 score of at least 22s for decreased fall risk with standing ADLs ~ MET, 10/10/23, NOT MET, 10/24/23   Pt to perform 6 minute walk test for 800 feet or greater to improve gait speed & endurance ~ MET, 10/10/23 ~ Revise this goal to 6541-4739 feet~ ongoing    Plan     Continue PT plan of care with a focus on lower extremity strengthening, improving flexibility, core/balance training, and gait as appropriate    Laly Fagan, PT   11/14/2023

## 2023-12-08 ENCOUNTER — DOCUMENTATION ONLY (OUTPATIENT)
Dept: REHABILITATION | Facility: HOSPITAL | Age: 18
End: 2023-12-08
Payer: MEDICAID

## 2023-12-08 NOTE — PROGRESS NOTES
OUTPATIENT PHYSICAL THERAPY DISCHARGE SUMMARY     Name: Kenji Malone  Clinic Number: [id number]    Diagnosis: Spinal Muscular Atrophy   Physician: Melanie Delgado MD   Treatment Orders: PT Eval and Treat  Past Medical History:   Diagnosis Date    Bilateral hydronephrosis 6/1/2022    Neurogenic bladder 6/1/2022    Spina bifida of lumbar region without hydrocephalus 2/22/2023    hydrocephalus resolved,  shunt not working       Initial visit: 8/29/23  Date of Last visit: 11/14/23  Date of Discharge Note:  12/8/23  Total Visits Received: 9  Missed Visits: 11 cancels and 1 no show  ASSESSMENT   Status Towards Goals Met:      The below represents pt's most recent goal achievement as of his last attended session on 11/14/23:       Goals:   Short Term Goals: 4 weeks   Pt will be issued first installment of HEP and report at least partial compliance~ MET, 10/10/23   Pt will improve his B resting knee extension by 2-3 degrees B to allow for improved standing posture and gait fluidity ~ MET, 10/10/23, NOT MET, 10/24/23  Pt will improve postural control with MCTSIB condition 4 score of at least 10s for decreased fall risk with standing ADLs~ MET, 10/10/23, NOT MET, 10/24/23  Pt to perform 6 minute walk test for full 6 minutes before stopping to improve gait speed & endurance ~  MET, 10/10/23  Pt to improve his 5x sit<>stand test to < 12 seconds for decreased overall fall risk ~ MET, 10/10/23  Pt to improve his SSWS to 1.0 m/sec for improved community ambulation and decreased fall risk~ MET, 10/26/23  Pt will improve his MMT grades for knee flexion OR hip abduction by 1/3 for improved stability and gait mechanics ~ MET, 10/10/23, partially MET, 10/24/23     Long Term Goals: 8 weeks   Pt will be independent and at least partially compliant with finalized HEP to help maintain potential gains realized in PT Ongoing   Pt will improve his B resting knee extension by 4-5 degrees B to allow for improved standing posture and  gait fluidity~MET, 10/10/23, NOT MET, 10/24/23  Pt will improve his SSWS to 1.2 m/sec for improved community ambulation and decreased fall risk Ongoing   Pt will improve his MMT grades for knee flexion AND hip abduction by 1/3 each for improved stability and gait mechanics Ongoing and progressing  Pt will improve postural control with MCTSIB condition 4 score of at least 22s for decreased fall risk with standing ADLs ~ MET, 10/10/23, NOT MET, 10/24/23   Pt to perform 6 minute walk test for 800 feet or greater to improve gait speed & endurance ~ MET, 10/10/23 ~ Revise this goal to 4588-3950 feet~ ongoing        Goals Not achieved and why:     Pt did not meet some goals due to multiple cancelled visits.         Discharge reason : Patient self discharge    PLAN   This patient is discharged from Outpatient Physical Therapy Services.     David Frankel, PT  12/08/2023

## 2023-12-12 ENCOUNTER — OUTPATIENT CASE MANAGEMENT (OUTPATIENT)
Dept: ADMINISTRATIVE | Facility: OTHER | Age: 18
End: 2023-12-12
Payer: MEDICAID

## 2023-12-12 NOTE — PROGRESS NOTES
Outpatient Care Management   - Low Risk Patient Assessment    Patient: Kenji Malone  MRN:  [id number]  Date of Service:  12/12/2023  Completed by:  Tsering Manzo LCSW  Referral Date:     Reason for Visit   Patient presents with    Social Work Assessment - Low/Mod Risk    OPCM Enrollment Call       Brief Summary:  received a referral from [facility] therapists (JOSEPH Hernandez) for the following patient identified psycho-social needs: issue with transportation via WC and getting out of the home for therapy. SW received call from mother regarding this issue. Pt and Pt mother elected to participate in the OPCM program. Social work assessment completed. Pt and Pt mother reported Pt lives with with her in a single story home that has several steps to enter. Pt utilizes a WC for DME until 1/20 when his leg should be healed. He is now dependent on her for his ADLs. Care plan was created in collaboration with patient/caregiver input with the following goals: Plan A is to try for therapy using HH, Plan B is to start using Medicaid transportation but if this is the option, she will need a temp ramp first.     Contacted [facility] and advised that while they take [employer] medicaid, they only get paid for providing skilled nursing services, [employer] Medicaid does not cover therapy in the home. Contacted [employer] Medicaid provider line. Advised they can see that home services are provided, but it does not specify if they cover therapy or just a skilled nurse. They reported Pt and his mother would probably get further by calling member services at [phone]. SW contacted Pt mother to report this and ask her to call. If she is able to get them to agree that they would provide a SCA for therapy, then this SW will arrange for HH. If not, then this SW will move to plan B.    No future appointments.     Tsering Manzo LCSW  Neuro Therapy   [facility]  [phone]

## 2023-12-18 ENCOUNTER — OUTPATIENT CASE MANAGEMENT (OUTPATIENT)
Dept: ADMINISTRATIVE | Facility: OTHER | Age: 18
End: 2023-12-18
Payer: MEDICAID

## 2023-12-18 NOTE — PROGRESS NOTES
Outpatient Care Management   - Care Plan Follow Up    Patient: Kenji Malone  MRN:  [id number]  Date of Service:  12/18/2023  Completed by:  Tsering Manzo LCSW  Referral Date: 12/12/2023    Reason for Visit   Patient presents with    OPCM SW Follow Up Call       Brief Summary: Pt mother reported she does not see the point in trying to get medicaid to pay for therapy at home so she rented a ramp and they plan to utilize medicaid transportation to get Pt to therapy. She has an appt tomorrow with Dr Babin with [facility] to obtain new orders for PT. Once she knows the orders are in, she will call this SW to get Pt on the schedule and this SW will email her the information to schedule transport along with his schedule.     Tsering Manzo LCSW  Neuro Therapy   [facility]  [phone]

## 2023-12-22 ENCOUNTER — OUTPATIENT CASE MANAGEMENT (OUTPATIENT)
Dept: ADMINISTRATIVE | Facility: OTHER | Age: 18
End: 2023-12-22
Payer: MEDICAID

## 2023-12-22 NOTE — PROGRESS NOTES
Outpatient Care Management   - Care Plan Follow Up    Patient: Kenji Malone  MRN:  [id number]  Date of Service:  12/22/2023  Completed by:  Tsering Manzo LCSW  Referral Date: 12/12/2023    Reason for Visit   Patient presents with    OPCM SW Follow Up Call              Brief Summary: Contacted Pt mother to check in, she was in MD office and reported she would call back. Orders for therapy not yet received. Will follow up next week.    No future appointments.    Tsering Manzo LCSW  Neuro Therapy   [facility]  [phone]

## 2024-01-04 ENCOUNTER — OUTPATIENT CASE MANAGEMENT (OUTPATIENT)
Dept: ADMINISTRATIVE | Facility: OTHER | Age: 19
End: 2024-01-04
Payer: MEDICAID

## 2024-01-04 NOTE — PROGRESS NOTES
Outpatient Care Management   - Care Plan Follow Up    Patient: Kenji Malone  MRN:  [id number]  Date of Service:  1/4/2024  Completed by:  Tsering Manzo LCSW  Referral Date: 12/12/2023    Reason for Visit   Patient presents with    OPCM SW Follow Up Call       Brief Summary: SW contacted Pt mother to check in and she reported they have a ramp now from [employer] and do not need Medicaid transportation anymore as Pt is able to fit himself in the car now. She is still working on getting a referral in for him to start therapy and has an MD appt tomorrow. Provided fax to this location so that this SW can expedite the scheduling process if possible.     1/5: Orders dropped off by mother in person. Uploaded to media and advised Pt access rep. Centralized scheduling to be notified to be scheduled asap.     Tsering Manzo LCSW  Neuro Therapy   [facility]  [phone]

## 2024-01-05 DIAGNOSIS — Z98.890 POSTOPERATIVE STATE: Primary | ICD-10-CM

## 2024-01-23 ENCOUNTER — OUTPATIENT CASE MANAGEMENT (OUTPATIENT)
Dept: ADMINISTRATIVE | Facility: OTHER | Age: 19
End: 2024-01-23
Payer: MEDICAID

## 2024-01-23 NOTE — PROGRESS NOTES
Outpatient Care Management   - Care Plan Follow Up    Patient: Kenji Malone  MRN:  [id number]  Date of Service:  1/23/2024  Completed by:  Tsering Manzo LCSW  Referral Date: 12/12/2023    Reason for Visit   Patient presents with    Other     1/23/2024  1st attempt to complete Follow-Up  for Outpatient Care Management, left message.        OPCM SW Follow Up Call       Brief Summary: SW advised by  that three messages left for Pt mother to schedule this Pt appts. Spoke with Pt mother today and she reported she did not get the calls. She advised they have an appt in [place] to see the MD that completed the surgery for follow up. They will get the list of restrictions and permitted activity and then she will call this SW when they return on Friday to set up his appts.     Tsering Manzo LCSW  Neuro Therapy   [facility]  [phone]

## 2024-01-29 ENCOUNTER — OUTPATIENT CASE MANAGEMENT (OUTPATIENT)
Dept: ADMINISTRATIVE | Facility: OTHER | Age: 19
End: 2024-01-29
Payer: MEDICAID

## 2024-02-01 ENCOUNTER — CLINICAL SUPPORT (OUTPATIENT)
Dept: REHABILITATION | Facility: HOSPITAL | Age: 19
End: 2024-02-01
Payer: MEDICAID

## 2024-02-01 DIAGNOSIS — R26.89 IMPAIRED WEIGHT BEARING: ICD-10-CM

## 2024-02-01 DIAGNOSIS — Z74.09 IMPAIRED FUNCTIONAL MOBILITY, BALANCE, GAIT, AND ENDURANCE: Primary | ICD-10-CM

## 2024-02-01 DIAGNOSIS — R29.898 DECREASED STRENGTH INVOLVING KNEE JOINT: ICD-10-CM

## 2024-02-01 PROCEDURE — 97162 PT EVAL MOD COMPLEX 30 MIN: CPT | Mod: PO

## 2024-02-01 NOTE — PROGRESS NOTES
"[facility]  Physical Therapy Neurological Rehabilitation Initial Evaluation     Name: Kenji Malone  Clinic Number: [id number]    Therapy Diagnosis:   Encounter Diagnoses   Name Primary?    Impaired functional mobility, balance, gait, and endurance Yes    Decreased strength involving knee joint     Impaired weight bearing      Physician: Mauricio Jaimes MD    Physician Orders: PT Eval and Treat   Medical Diagnosis from Referral: Postoperative state  Evaluation Date: 2/1/2024  Authorization Period Expiration: 12/31/2024  Plan of Care Expiration: 3/31/2024  Progress Note Due: 3/1/2024  Visit # / Visits authorized: 1/ 1  FOTO: 1/3    Precautions: Standard and Fall    Time In: 1345  Time Out: 1430  Total Billable Time: 45 minutes    Subjective      Date of onset: 11/25/2024    History of current condition - Kenji reports: He had "the ligament surgery" in November and hasn't really walked since then. Mother brings in after visit summary from MD in [facility],  see media. Per chart review, patient had a right distal femoral osteotomy in Nov 2023 in [place]. Per most recent encounter with the physician, he is weight bearing as tolerated on R LE, he can advance out of the brace beginning late Feb.      Imaging, x-ray "01/25/2024 9:43 AM EST     Healing osteotomy site distal right femur in good alignment with  presence of the hardware without failure in distal right femur " (1/25/2024)    Prior Therapy: Pediatric therapy at [facility] and Neuro PT at this site  Social History: lives with his family~ has an older brother  Falls: no falls in the past 6 months   DME: none    Home Environment: lives in one story home in [place], 3 steps to enter home with B Handrails but can only reach one at a time   Exercise Routine / History: goes to gym and works on stepper, leg press at [employer]  Family Present at time of Eval: mom, [place]  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  Prior Level of " "Function: modified independent but does not drive  Current Level of Function: modified independent but currently unable to stand up for long periods nor able to ambulate    Pain:  Current 0/10, worst 5/10, best 0/10   Location: right knee   Description: Tight  Aggravating Factors: Standing and Walking  Easing Factors: rest    Patient's goals: "walking again" Mom's goal "be independent again    Medical History:   Past Medical History:   Diagnosis Date    Bilateral hydronephrosis 6/1/2022    Neurogenic bladder 6/1/2022    Spina bifida of lumbar region without hydrocephalus 2/22/2023    hydrocephalus resolved,  shunt not working       Surgical History:   Kenji Malone  has no past surgical history on file.    Medications:   Kenji has a current medication list which includes the following prescription(s): myrbetriq, oxybutynin, and tamsulosin.    Allergies:   Review of patient's allergies indicates:   Allergen Reactions    Latex, natural rubber         Objective     Observation: pleasant and cooperative   Speech: fluent    Mental status: alert, oriented to person, place, and time, normal mood, behavior, speech, dress, motor activity, and thought processes  Appearance: Casually dressed and Well groomed  Behavior:  calm, cooperative, and adequate rapport can be established  Attention Span and Concentration:  Normal and Grossly intact    Posture Alignment :slouched posture    Dominant hand:  right     Skin integrity:  Intact    Visual/Auditory: denies changes     ROM:   GROSS AROM/PROM  UPPER EXTREMITY  (R) UE: WFLs  (L) UE: WFLs      10/24/23  ROM:   GROSS AROM/PROM     LOWER EXTREMITY~ tested in supine  (R) LE: limited as follows: R hip rests in 18 degrees flexion; R knee rests in 29 degrees flexion  (L) LE: limited as follows: L hip rests in 25 degrees flexion; L knee rests in 25 degrees flexion    LOWER EXTREMITY  (R) LE: limited as follows: R hip rests in 0 degrees flexion; R knee rests in 5 degrees flexion 0 with " overpressure, pt able to bring R LE into 95 degrees of flexion AROM  (L) LE: limited as follows: L hip rests in 18 degrees flexion; L knee rests in 18 degrees flexion, normal AROM into full flexion      Lower Extremity Strength~ tested in sitting  Right LE 10/24/23 previous POC Eval 2/6/2024   Left LE 10/24/23  Previous POC Eval 2/6/2024     Hip flexion:  5/5 5/5 Hip flexion: 5/5 5/5   Knee extension: 5/5 3/5 Knee extension: 5/5 5/5   Knee flexion: 4+/5 4-/5* Knee flexion: 3/5 3/5   Ankle dorsiflexion:  1+/5 1/5 Ankle dorsiflexion: trace trace/5   Ankle plantarflexion:  1/5 1/5 Ankle plantarflexion: trace trace/5   Hip abduction: 3+/5 to 4-/5* 3+/5 Hip abduction: 3+/5* 3+/5   Hip adduction: 5/5 5/5 Hip adduction 5/5 5/5   Hip extension: 4/5 3+/5 Hip extension: 5/5 5/5   *within ROM      Upper extremity strength:      Coordination:   Rapid Alternating Movements: NT  Point to Point:    -Finger to Nose: NT   -Heel to Shin: NT    Sensation: intact    Tone/Spasticity: hypotonia in B ankle and feet    Functional mobility:   Wheelchair to mat transfer: modified independent  Sit <> Stand from EOM to RW: SBA  Standing tolerance: 35 seconds standing tolerance, 1:17 seconds            Gait Assessment:   - AD used: RW  - Assistance: pt unable to ambulate today due to wearing socks during evaluation, no shoes brought to session  - Stairs: NT/unable        Endurance Deficit: moderate to severe, unable to tolerate more than a minute in standing at RW     PT Evaluation Completed? Yes      Intake Outcome Measure for FOTO Intake Survey    Therapist reviewed FOTO scores for Kenji Malone on 2/1/2024.   FOTO documents entered into [employer] - see Media section.    Intake Score: 21         Treatment     Total Treatment time separate from Evaluation: 5 minutes    Kenji received therapeutic exercises to develop strength, endurance, ROM, flexibility, posture, and core stabilization for 5 minutes including:  X10 reps quad set,         Patient  Education and Home Exercises     Education provided:   - home exercise program  - WBAT, use the limb  - role of PT, plan of care     Written Home Exercises Provided: yes.  Exercises were reviewed and Kenji was able to demonstrate them prior to the end of the session.  Kenji demonstrated good  understanding of the education provided.     See EMR under Patient Instructions for exercises provided 2/1/2024.    Assessment     Kenji is a 18 y.o. male referred to outpatient Physical Therapy with a medical diagnosis of status post R distal femoral osteotomy. Patient presents with improved ROM in the R knee post procedure, but he is very hesitant to move this extremity. He presents to session with brace donned and reports of not doing much since procedure. Pt demonstrates impaired standing endurance, but PT unable to assess ambulation today due to patient presenting to session with socks only and no shoes/ for ambulation. PT to assess this at future sessions. He overall demonstrates decreased strength, endurance, mobility, and some pain in the RLE since last session in October. He and his mother are agreeable to the goals set and are onboard for plan of care at 2x per week.     Patient prognosis is Good.   Patient will benefit from skilled outpatient Physical Therapy to address the deficits stated above and in the chart below, provide patient/family education, and to maximize patient's level of independence.     Plan of care discussed with patient: Yes  Patient's spiritual, cultural and educational needs considered and patient is agreeable to the plan of care and goals as stated below:     Anticipated Barriers for therapy: co-morbidities    Medical Necessity is demonstrated by the following  History  Co-morbidities and personal factors that may impact the plan of care [] LOW: no personal factors / co-morbidities  [x] MODERATE: 1-2 personal factors / co-morbidities  [] HIGH: 3+ personal factors /  co-morbidities    Moderate / High Support Documentation: spina bifida     Examination  Body Structures and Functions, activity limitations and participation restrictions that may impact the plan of care [] LOW: addressing 1-2 elements  [] MODERATE: 3+ elements  [x] HIGH: 4+ elements (please support below)    Moderate / High Support Documentation: strength, ROM, balance, gait, endurance     Clinical Presentation [] LOW: stable  [x] MODERATE: Evolving  [] HIGH: Unstable     Decision Making/ Complexity Score: moderate     Goals:  Short Term Goals: 4 weeks   Patient will be independent with home exercise program.  Patient will be able to tolerate standing for at least 5 minutes with LRAD in order to improve function at home.   Patient will be able to ambulate at least 50 feet with LRAD and CGA only in order to improve his independence at home.   Patient will improve R quad strength to at least 4-/5 in order to improve his ability to tolerate standing.  Patient will be able to complete TUG, SSWS, MCTSIB, and 5x STS testing and set appropriate long term goals.    Long Term Goals: 8 weeks   Patient will improve FOTO to at least 39 in order to demonstrate improved perception of his abilities.   Patient will be able to tolerate 5 minutes of standing balance without AD in order to improve function at home.   Patient will be able to ambulate at least 300' with LRAD or no AD at [facility] in order to return to job at [employer].   Patient will be able to improve R knee flexion to at least 110 degrees in order to demonstrate normal ROM in this extremity.      Plan     Plan of care Certification: 2/1/2024 to 3/31/2024.    Outpatient Physical Therapy 2 times weekly for 8 weeks to include the following interventions: Gait Training, Manual Therapy, Moist Heat/ Ice, Neuromuscular Re-ed, Orthotic Management and Training, Patient Education, Self Care, Therapeutic Activities, and Therapeutic Exercise.     Star Suarez, PT

## 2024-02-06 ENCOUNTER — OUTPATIENT CASE MANAGEMENT (OUTPATIENT)
Dept: ADMINISTRATIVE | Facility: OTHER | Age: 19
End: 2024-02-06
Payer: MEDICAID

## 2024-02-06 ENCOUNTER — CLINICAL SUPPORT (OUTPATIENT)
Dept: REHABILITATION | Facility: HOSPITAL | Age: 19
End: 2024-02-06
Payer: MEDICAID

## 2024-02-06 DIAGNOSIS — Z74.09 IMPAIRED FUNCTIONAL MOBILITY, BALANCE, GAIT, AND ENDURANCE: Primary | ICD-10-CM

## 2024-02-06 DIAGNOSIS — R26.89 IMPAIRED WEIGHT BEARING: ICD-10-CM

## 2024-02-06 DIAGNOSIS — R29.898 DECREASED STRENGTH INVOLVING KNEE JOINT: ICD-10-CM

## 2024-02-06 PROCEDURE — 97110 THERAPEUTIC EXERCISES: CPT | Mod: PO

## 2024-02-06 NOTE — PLAN OF CARE
"[facility]  Physical Therapy Neurological Rehabilitation Initial Evaluation     Name: Kenji Malone  Clinic Number: [id number]    Therapy Diagnosis:   Encounter Diagnoses   Name Primary?    Impaired functional mobility, balance, gait, and endurance Yes    Decreased strength involving knee joint     Impaired weight bearing      Physician: Mauricio Jaimes MD    Physician Orders: PT Eval and Treat   Medical Diagnosis from Referral: Postoperative state  Evaluation Date: 2/1/2024  Authorization Period Expiration: 12/31/2024  Plan of Care Expiration: 3/31/2024  Progress Note Due: 3/1/2024  Visit # / Visits authorized: 1/ 1  FOTO: 1/3    Precautions: Standard and Fall    Time In: 1345  Time Out: 1430  Total Billable Time: 45 minutes    Subjective      Date of onset: 11/25/2024    History of current condition - Kenji reports: He had "the ligament surgery" in November and hasn't really walked since then. Mother brings in after visit summary from MD in [facility],  see media. Per chart review, patient had a right distal femoral osteotomy in Nov 2023 in [place]. Per most recent encounter with the physician, he is weight bearing as tolerated on R LE, he can advance out of the brace beginning late Feb.      Imaging, x-ray "01/25/2024 9:43 AM EST     Healing osteotomy site distal right femur in good alignment with  presence of the hardware without failure in distal right femur " (1/25/2024)    Prior Therapy: Pediatric therapy at [facility] and Neuro PT at this site  Social History: lives with his family~ has an older brother  Falls: no falls in the past 6 months   DME: none    Home Environment: lives in one story home in [place], 3 steps to enter home with B Handrails but can only reach one at a time   Exercise Routine / History: goes to gym and works on stepper, leg press at [employer]  Family Present at time of Eval: mom, [place]  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  Prior Level of " "Function: modified independent but does not drive  Current Level of Function: modified independent but currently unable to stand up for long periods nor able to ambulate    Pain:  Current 0/10, worst 5/10, best 0/10   Location: right knee   Description: Tight  Aggravating Factors: Standing and Walking  Easing Factors: rest    Patient's goals: "walking again" Mom's goal "be independent again    Medical History:   Past Medical History:   Diagnosis Date    Bilateral hydronephrosis 6/1/2022    Neurogenic bladder 6/1/2022    Spina bifida of lumbar region without hydrocephalus 2/22/2023    hydrocephalus resolved,  shunt not working       Surgical History:   Kenji Malone  has no past surgical history on file.    Medications:   Kenji has a current medication list which includes the following prescription(s): myrbetriq, oxybutynin, and tamsulosin.    Allergies:   Review of patient's allergies indicates:   Allergen Reactions    Latex, natural rubber         Objective     Observation: pleasant and cooperative   Speech: fluent    Mental status: alert, oriented to person, place, and time, normal mood, behavior, speech, dress, motor activity, and thought processes  Appearance: Casually dressed and Well groomed  Behavior:  calm, cooperative, and adequate rapport can be established  Attention Span and Concentration:  Normal and Grossly intact    Posture Alignment :slouched posture    Dominant hand:  right     Skin integrity:  Intact    Visual/Auditory: denies changes     ROM:   GROSS AROM/PROM  UPPER EXTREMITY  (R) UE: WFLs  (L) UE: WFLs      10/24/23  ROM:   GROSS AROM/PROM     LOWER EXTREMITY~ tested in supine  (R) LE: limited as follows: R hip rests in 18 degrees flexion; R knee rests in 29 degrees flexion  (L) LE: limited as follows: L hip rests in 25 degrees flexion; L knee rests in 25 degrees flexion    LOWER EXTREMITY  (R) LE: limited as follows: R hip rests in 0 degrees flexion; R knee rests in 5 degrees flexion 0 with " overpressure, pt able to bring R LE into 95 degrees of flexion AROM  (L) LE: limited as follows: L hip rests in 18 degrees flexion; L knee rests in 18 degrees flexion, normal AROM into full flexion      Lower Extremity Strength~ tested in sitting  Right LE 10/24/23 previous POC Eval 2/6/2024   Left LE 10/24/23  Previous POC Eval 2/6/2024     Hip flexion:  5/5 5/5 Hip flexion: 5/5 5/5   Knee extension: 5/5 3/5 Knee extension: 5/5 5/5   Knee flexion: 4+/5 4-/5* Knee flexion: 3/5 3/5   Ankle dorsiflexion:  1+/5 1/5 Ankle dorsiflexion: trace trace/5   Ankle plantarflexion:  1/5 1/5 Ankle plantarflexion: trace trace/5   Hip abduction: 3+/5 to 4-/5* 3+/5 Hip abduction: 3+/5* 3+/5   Hip adduction: 5/5 5/5 Hip adduction 5/5 5/5   Hip extension: 4/5 3+/5 Hip extension: 5/5 5/5   *within ROM      Upper extremity strength:      Coordination:   Rapid Alternating Movements: NT  Point to Point:    -Finger to Nose: NT   -Heel to Shin: NT    Sensation: intact    Tone/Spasticity: hypotonia in B ankle and feet    Functional mobility:   Wheelchair to mat transfer: modified independent  Sit <> Stand from EOM to RW: SBA  Standing tolerance: 35 seconds standing tolerance, 1:17 seconds            Gait Assessment:   - AD used: RW  - Assistance: pt unable to ambulate today due to wearing socks during evaluation, no shoes brought to session  - Stairs: NT/unable        Endurance Deficit: moderate to severe, unable to tolerate more than a minute in standing at RW     PT Evaluation Completed? Yes      Intake Outcome Measure for FOTO Intake Survey    Therapist reviewed FOTO scores for Kenji Malone on 2/1/2024.   FOTO documents entered into [email or web address] - see Media section.    Intake Score: 21         Treatment     Total Treatment time separate from Evaluation: 5 minutes    Kenji received therapeutic exercises to develop strength, endurance, ROM, flexibility, posture, and core stabilization for 5 minutes including:  X10 reps quad set,         Patient  Education and Home Exercises     Education provided:   - home exercise program  - WBAT, use the limb  - role of PT, plan of care     Written Home Exercises Provided: yes.  Exercises were reviewed and Kenji was able to demonstrate them prior to the end of the session.  Kenji demonstrated good  understanding of the education provided.     See EMR under Patient Instructions for exercises provided 2/1/2024.    Assessment     Kenji is a 18 y.o. male referred to outpatient Physical Therapy with a medical diagnosis of status post R distal femoral osteotomy. Patient presents with improved ROM in the R knee post procedure, but he is very hesitant to move this extremity. He presents to session with brace donned and reports of not doing much since procedure. Pt demonstrates impaired standing endurance, but PT unable to assess ambulation today due to patient presenting to session with socks only and no shoes/ for ambulation. PT to assess this at future sessions. He overall demonstrates decreased strength, endurance, mobility, and some pain in the RLE since last session in October. He and his mother are agreeable to the goals set and are onboard for plan of care at 2x per week.     Patient prognosis is Good.   Patient will benefit from skilled outpatient Physical Therapy to address the deficits stated above and in the chart below, provide patient/family education, and to maximize patient's level of independence.     Plan of care discussed with patient: Yes  Patient's spiritual, cultural and educational needs considered and patient is agreeable to the plan of care and goals as stated below:     Anticipated Barriers for therapy: co-morbidities    Medical Necessity is demonstrated by the following  History  Co-morbidities and personal factors that may impact the plan of care [] LOW: no personal factors / co-morbidities  [x] MODERATE: 1-2 personal factors / co-morbidities  [] HIGH: 3+ personal factors /  co-morbidities    Moderate / High Support Documentation: spina bifida     Examination  Body Structures and Functions, activity limitations and participation restrictions that may impact the plan of care [] LOW: addressing 1-2 elements  [] MODERATE: 3+ elements  [x] HIGH: 4+ elements (please support below)    Moderate / High Support Documentation: strength, ROM, balance, gait, endurance     Clinical Presentation [] LOW: stable  [x] MODERATE: Evolving  [] HIGH: Unstable     Decision Making/ Complexity Score: moderate     Goals:  Short Term Goals: 4 weeks   Patient will be independent with home exercise program.  Patient will be able to tolerate standing for at least 5 minutes with LRAD in order to improve function at home.   Patient will be able to ambulate at least 50 feet with LRAD and CGA only in order to improve his independence at home.   Patient will improve R quad strength to at least 4-/5 in order to improve his ability to tolerate standing.  Patient will be able to complete TUG, SSWS, MCTSIB, and 5x STS testing and set appropriate long term goals.    Long Term Goals: 8 weeks   Patient will improve FOTO to at least 39 in order to demonstrate improved perception of his abilities.   Patient will be able to tolerate 5 minutes of standing balance without AD in order to improve function at home.   Patient will be able to ambulate at least 300' with LRAD or no AD at [facility] in order to return to job at [employer].   Patient will be able to improve R knee flexion to at least 110 degrees in order to demonstrate normal ROM in this extremity.      Plan     Plan of care Certification: 2/1/2024 to 3/31/2024.    Outpatient Physical Therapy 2 times weekly for 8 weeks to include the following interventions: Gait Training, Manual Therapy, Moist Heat/ Ice, Neuromuscular Re-ed, Orthotic Management and Training, Patient Education, Self Care, Therapeutic Activities, and Therapeutic Exercise.     Star Suarez, PT

## 2024-02-06 NOTE — PROGRESS NOTES
Outpatient Care Management   - Care Plan Follow Up    Patient: Kenji Malone  MRN:  [id number]  Date of Service:  2/6/2024  Completed by:  Tsering Manzo LCSW  Referral Date: 12/12/2023    Reason for Visit   Patient presents with    OPCM SW Follow Up Call       Brief Summary: SW checked in with Pt mother and Pt when they attended today's eval appt. PT reported she will complete the eval and then send the plan of care note to MD for review. Contacted MD office for fax so that he can receive the plan of care. [facility] but will be [facility] until Friday and will review it then. All faxes are uploaded to the chart and sent to the provider for review. Provided to PT so that she could complete route to MD. Pt mother reported everything else is going well.    Future Appointments   Date Time Provider Department Center   2/8/2024  2:30 PM [facility] PT [facility]   2/20/2024  4:15 PM Marian Mercado, PTA [facility]   2/27/2024  5:00 PM Laly Fagan, PT [facility]   2/29/2024  3:30 PM Laly Fagan, PT [facility]   3/5/2024  2:30 PM Marian Mercado, PTA [facility]   3/7/2024  2:30 PM Laly Fagan, PT [facility]   3/12/2024  2:30 PM Marian Mercado, PTA [facility]   3/14/2024  2:30 PM David Frankel, PT [facility]   3/19/2024  2:30 PM Laly Fagan, PT [facility]   3/21/2024  2:30 PM Marian Mercado, PTA [facility]   3/26/2024  2:30 PM Marian Mercado, PTA [facility]   3/28/2024  2:30 PM Laly Fagan, PT [facility]   4/2/2024  2:30 PM Laly Fagan, PT [facility]   4/4/2024  2:30 PM Laly Fagan, PT [facility]   4/9/2024  2:30 PM Laly Fagan, PT [facility]     Tsering Manzo, IRINA  Neuro Therapy   [facility]  [phone]

## 2024-02-06 NOTE — PROGRESS NOTES
[facility]   Physical Therapy Treatment Note     Name: Kenji Malone  Clinic Number: [id number]    Therapy Diagnosis:   Encounter Diagnoses   Name Primary?    Impaired functional mobility, balance, gait, and endurance Yes    Decreased strength involving knee joint     Impaired weight bearing      Physician: Order, Paper    Visit Date: 2/6/2024    Physician who did surgery: Mauricio Jaimes MD     Physician Orders: PT Eval and Treat   Medical Diagnosis from Referral: Postoperative state  Evaluation Date: 2/1/2024  Authorization Period Expiration: 12/31/2024  Plan of Care Expiration: 3/31/2024  Progress Note Due: 3/1/2024  Visit # / Visits authorized: 1/ 20  FOTO: 1/3    PTA Visit #: 0/5     Time In: 1430  Time Out: 1515  Total Billable Time: 45 minutes    Subjective     Pt reports: He is fine this afternoon. Nothing new to report, he is ready to work.    He was compliant with home exercise program.  Response to previous treatment: felt fine  Functional change: ongoing    Pain: 0/10  Location:  n/a     OBJECTIVE     Objective Measures updated at progress report unless specified.     Treatment     Kenji received the treatments listed below:      therapeutic exercises to develop strength, endurance, ROM, flexibility, posture, and core stabilization for 45 minutes including:    3 x 10 reps LAQ with 2 sec hold at top  2 x 10 reps supine heel slides, 2 sec hold at top, pt reports pain that subsides as soon as exercise is complete  2 x 10 reps sit to stand from EOM to RW, cues to keep B LE even (pt tends to place L LE posterior to R LE)    3 minutes standing at RW, L/R weight shifting    Ambulation various lengths, 10 feet up to ~50 feet, x4 trials total using RW and CGA only from PT    X2 trials stair negotiation:  - first trial with B UE on B handrails, cues from PT for which leg to lead with Eros for some balance, step to gait pattern  - second trial with B UE on R handrail, side stepping,  Eros for slight balance assist, cues for sequencing    Home Exercises Provided and Patient Education Provided     Education provided:   - home exercise program     Written Home Exercises Provided: yes.  Exercises were reviewed and Kenji was able to demonstrate them prior to the end of the session.  Kenji demonstrated good  understanding of the education provided.     See EMR under Patient Instructions for exercises provided 2/6/2024.    Assessment     Joshua did well in first session since reassessment. He and mom were educated on the need to move the R LE in order to improve strength and ROM in that leg. He was also educated in ensuring B LE bear weight when performing sit to stand and assigned additional exercises for home exercise program. He does well with ambulation and stair negotiation today though PT recommends to continue to attempt these prior to performing at home. PT reccommends attempting recumbent bike today, however he is uncomfortable attempting this today. He remains appropraite for skilled PT services in order to improve weight bearing on R LE, ROM, and strength of this LE.    Kenji Is progressing well towards his goals.   Pt prognosis is Good.     Pt will continue to benefit from skilled outpatient physical therapy to address the deficits listed in the problem list box on initial evaluation, provide pt/family education and to maximize pt's level of independence in the home and community environment.     Pt's spiritual, cultural and educational needs considered and pt agreeable to plan of care and goals.     Anticipated barriers to physical therapy: co-morbidities    Goals:  Short Term Goals: 4 weeks   Patient will be independent with home exercise program. ongoing  Patient will be able to tolerate standing for at least 5 minutes with LRAD in order to improve function at home. ongoing  Patient will be able to ambulate at least 50 feet with LRAD and CGA only in order to improve his independence at  home. ongoing  Patient will improve R quad strength to at least 4-/5 in order to improve his ability to tolerate standing.ongoing  Patient will be able to complete TUG, SSWS, MCTSIB, and 5x STS testing and set appropriate long term goals.ongoing     Long Term Goals: 8 weeks   Patient will improve FOTO to at least 39 in order to demonstrate improved perception of his abilities. ongoing  Patient will be able to tolerate 5 minutes of standing balance without AD in order to improve function at home. ongoing  Patient will be able to ambulate at least 300' with LRAD or no AD at [facility] in order to return to job at [employer]. ongoing  Patient will be able to improve R knee flexion to at least 110 degrees in order to demonstrate normal ROM in this extremity.ongoing    Plan     Continue to work on ambulation and stair negotiation as well as ROM of the R LE to the patient's tolerance.     Star Suarez, PT

## 2024-02-08 ENCOUNTER — CLINICAL SUPPORT (OUTPATIENT)
Dept: REHABILITATION | Facility: HOSPITAL | Age: 19
End: 2024-02-08
Payer: MEDICAID

## 2024-02-08 DIAGNOSIS — R26.89 IMPAIRED WEIGHT BEARING: ICD-10-CM

## 2024-02-08 DIAGNOSIS — Z74.09 IMPAIRED FUNCTIONAL MOBILITY, BALANCE, GAIT, AND ENDURANCE: Primary | ICD-10-CM

## 2024-02-08 DIAGNOSIS — R29.898 DECREASED STRENGTH INVOLVING KNEE JOINT: ICD-10-CM

## 2024-02-08 PROCEDURE — 97110 THERAPEUTIC EXERCISES: CPT | Mod: PO

## 2024-02-08 NOTE — PROGRESS NOTES
"[facility]   Physical Therapy Treatment Note     Name: Kenji Malone  Clinic Number: [id number]    Therapy Diagnosis:   Encounter Diagnoses   Name Primary?    Impaired functional mobility, balance, gait, and endurance Yes    Decreased strength involving knee joint     Impaired weight bearing        Physician: Order, Paper    Visit Date: 2/8/2024    Physician who did surgery: Mauricio Jaimes MD     Physician Orders: PT Eval and Treat   Medical Diagnosis from Referral: Postoperative state  Evaluation Date: 2/1/2024  Authorization Period Expiration: 12/31/2024  Plan of Care Expiration: 3/31/2024  Progress Note Due: 3/1/2024  Visit # / Visits authorized: 2/ 20  FOTO: 1/3    PTA Visit #: 0/5     Time In: 1437   Time Out: 1515  Total Billable Time: 38 minutes    Subjective     Pt reports: He is fine this afternoon. Nothing new to report, he is ready to work.    He was compliant with home exercise program.  Response to previous treatment: felt fine  Functional change: ongoing    Pain: 0/10  Location:  n/a     OBJECTIVE     Objective Measures updated at progress report unless specified.     Treatment     Kejni received the treatments listed below:      therapeutic exercises to develop strength, endurance, ROM, flexibility, posture, and core stabilization for 38 minutes including:    Pt doffs knee brace for the following exercises:   3 x 10 reps LAQ with 2 sec hold at top  2 x 10 reps supine heel slides, 2 sec hold at top, pt reports pain that subsides as soon as exercise is complete    Pt donns knee brace for the remainder of session with knee joint "unlocked":   2 x 2 minutes standing at RW, moving cylinders from R->L then L->R on table (to promote weight shifting), pt requires 1 UE support occasionally no UE support    Ambulation trials: 64' and 61' lengths, using RW and CGA only from PT, PT cues patient to take more equal step lengths, pt performing mostly step to gait pattern    X1 trial " stair negotiation with B UE on R handrail, side stepping, L UE leading with ascending and R LE leading when descending, Eros for slight balance assist, cues for sequencing      Home Exercises Provided and Patient Education Provided     Education provided:   - home exercise program     Written Home Exercises Provided: yes.  Exercises were reviewed and Kenji was able to demonstrate them prior to the end of the session.  Kenji demonstrated good  understanding of the education provided.     See EMR under Patient Instructions for exercises provided 2/6/2024.    Assessment     Joshua did well in session this afternoon. He continues to require cues and encouragement to put weight onto R LE. Due to not completing home exercise program, heel slides and LAQ performed today, continue to repeat these exercises if he is not performing them at home. Time spent performing functional standing tasks, ambulation and stairs to promote improved weightbearing and ROM of the R LE. He remains appropraite for skilled PT services in order to improve weight bearing on R LE, ROM, and strength of this LE.    Kenji Is progressing well towards his goals.   Pt prognosis is Good.     Pt will continue to benefit from skilled outpatient physical therapy to address the deficits listed in the problem list box on initial evaluation, provide pt/family education and to maximize pt's level of independence in the home and community environment.     Pt's spiritual, cultural and educational needs considered and pt agreeable to plan of care and goals.     Anticipated barriers to physical therapy: co-morbidities    Goals:  Short Term Goals: 4 weeks   Patient will be independent with home exercise program. ongoing  Patient will be able to tolerate standing for at least 5 minutes with LRAD in order to improve function at home. ongoing  Patient will be able to ambulate at least 50 feet with LRAD and CGA only in order to improve his independence at home.  ongoing  Patient will improve R quad strength to at least 4-/5 in order to improve his ability to tolerate standing.ongoing  Patient will be able to complete TUG, SSWS, MCTSIB, and 5x STS testing and set appropriate long term goals.ongoing     Long Term Goals: 8 weeks   Patient will improve FOTO to at least 39 in order to demonstrate improved perception of his abilities. ongoing  Patient will be able to tolerate 5 minutes of standing balance without AD in order to improve function at home. ongoing  Patient will be able to ambulate at least 300' with LRAD or no AD at [facility] in order to return to job at [employer]. ongoing  Patient will be able to improve R knee flexion to at least 110 degrees in order to demonstrate normal ROM in this extremity.ongoing    Plan     Continue to work on ambulation and stair negotiation as well as ROM of the R LE to the patient's tolerance.     Star Suarez, PT

## 2024-02-16 ENCOUNTER — CLINICAL SUPPORT (OUTPATIENT)
Dept: REHABILITATION | Facility: HOSPITAL | Age: 19
End: 2024-02-16
Payer: MEDICAID

## 2024-02-16 DIAGNOSIS — R29.898 DECREASED STRENGTH INVOLVING KNEE JOINT: ICD-10-CM

## 2024-02-16 DIAGNOSIS — Z74.09 IMPAIRED FUNCTIONAL MOBILITY, BALANCE, GAIT, AND ENDURANCE: Primary | ICD-10-CM

## 2024-02-16 DIAGNOSIS — R26.89 IMPAIRED WEIGHT BEARING: ICD-10-CM

## 2024-02-16 PROCEDURE — 97110 THERAPEUTIC EXERCISES: CPT | Mod: PO

## 2024-02-16 NOTE — PROGRESS NOTES
"[facility]   Physical Therapy Treatment Note     Name: Kenji Malone  Clinic Number: [id number]    Therapy Diagnosis:   Encounter Diagnoses   Name Primary?    Impaired functional mobility, balance, gait, and endurance Yes    Decreased strength involving knee joint     Impaired weight bearing          Physician: Order, Paper    Visit Date: 2/16/2024    Physician who did surgery: Mauricio Jaimes MD     Physician Orders: PT Eval and Treat   Medical Diagnosis from Referral: Postoperative state  Evaluation Date: 2/1/2024  Authorization Period Expiration: 12/31/2024  Plan of Care Expiration: 3/31/2024  Progress Note Due: 3/1/2024  Visit # / Visits authorized: 3/ 20(+eval)  FOTO: 1/3    PTA Visit #: 0/5     Time In: 1615    Time Out: 1705   Total Billable Time: 50  minutes    Subjective     Pt reports: He is fine this afternoon. Denies any pain.  He was compliant with home exercise program.  Response to previous treatment: felt fine  Functional change: ongoing    Pain: 0/10  Location:  N/A     OBJECTIVE     Objective Measures updated at progress report unless specified.     Pt presents to session via wheelchair with his mom. Pt wearing hinged knee brace to R LE in "locked" position.    Treatment     Kneji received the treatments listed below:      therapeutic exercises to develop strength, endurance, ROM, flexibility, posture, and core stabilization for 50 minutes including:    Stand pivot transfers w/c<> mat with mod I  Sit<> supine with mod I    Pt doffs knee brace for the following exercises:   2 x 10 reps R LE QS with 3 sec hold  2 x 10 reps R LE LAQ with 3 sec hold   3 x 10 reps supine R LE AA heel slides, 2 sec hold, pt reports discomfort with max knee flexion  2 x 10 reps supine R LE AA SLR~ added to HEP  2 x 10 reps supine R LE AA hip and knee flexion/extension    Pt dons knee brace for the remainder of session with knee joint "unlocked":     Ambulation trial: ~ 120 feet around gym " "using RW and SBA only from PT, PT cues patient to move walker more anteriorly to avoid stepping past wheels and to increase L step length     X 2 trials stair negotiation(4 steps) with B UE on R handrail, side stepping, L LE leading with ascending and R LE leading when descending, CGA for safety, occasional cues for appropriate foot placement.    Inside parallel bars:  X 3 laps forward/backward ambulation with B UE support  X 2 minutes of tossing beach ball back and forth with PT student, CGA  X 1 minute of tapping beach ball back and forth with PT student using alternate hands, CGA/slight min A due to posterior drift of hips      Home Exercises Provided and Patient Education Provided     Education provided:   - home exercise program  -2/16/24: PT provided new exercise for home( SLR). PT also recommended that pt " unlock" his brace at all times( to encourage improved R knee flexion) and encouraged him to ambulate more at home with his RW. Education also provided to pt and mother regarding appropriate brace position.    Written Home Exercises Provided: yes.  Exercises were reviewed and Kenji was able to demonstrate them prior to the end of the session.  Kenji demonstrated good  understanding of the education provided.     See EMR under Patient Instructions for exercises provided 2/6/2024; 2/16/24    Assessment     Joshua performed very well during today's session. He completed all mat exercises for R quad strengthening and knee ROM with only mild difficulty noted during heel slides( at max knee flexion). He was able to increase his ambulation distance with use of RW and managed to ascend and descend the training steps 2 times. He also practiced some forward/backward ambulation inside the parallel bars and worked on balance and R LE weight acceptance while tossing and tapping a beach ball. HEP was updated to include SLR. Joshua remains appropriate for skilled PT services in order to improve weight bearing on R LE, ROM, " and strength of this LE.    Kenji Is progressing well towards his goals.   Pt prognosis is Good.     Pt will continue to benefit from skilled outpatient physical therapy to address the deficits listed in the problem list box on initial evaluation, provide pt/family education and to maximize pt's level of independence in the home and community environment.     Pt's spiritual, cultural and educational needs considered and pt agreeable to plan of care and goals.     Anticipated barriers to physical therapy: co-morbidities    Goals:  Short Term Goals: 4 weeks   Patient will be independent with home exercise program. ongoing  Patient will be able to tolerate standing for at least 5 minutes with LRAD in order to improve function at home. ongoing  Patient will be able to ambulate at least 50 feet with LRAD and CGA only in order to improve his independence at home. ongoing  Patient will improve R quad strength to at least 4-/5 in order to improve his ability to tolerate standing.ongoing  Patient will be able to complete TUG, SSWS, MCTSIB, and 5x STS testing and set appropriate long term goals.ongoing     Long Term Goals: 8 weeks   Patient will improve FOTO to at least 39 in order to demonstrate improved perception of his abilities. ongoing  Patient will be able to tolerate 5 minutes of standing balance without AD in order to improve function at home. ongoing  Patient will be able to ambulate at least 300' with LRAD or no AD at [facility] in order to return to job at [employer]. ongoing  Patient will be able to improve R knee flexion to at least 110 degrees in order to demonstrate normal ROM in this extremity.ongoing    Plan     Continue to work on ambulation and stair negotiation as well as ROM of the R LE to the patient's tolerance.     David Frankel, PT   2/16/2024

## 2024-02-20 ENCOUNTER — CLINICAL SUPPORT (OUTPATIENT)
Dept: REHABILITATION | Facility: HOSPITAL | Age: 19
End: 2024-02-20
Payer: MEDICAID

## 2024-02-20 DIAGNOSIS — R26.89 IMPAIRED WEIGHT BEARING: ICD-10-CM

## 2024-02-20 DIAGNOSIS — Z74.09 IMPAIRED FUNCTIONAL MOBILITY, BALANCE, GAIT, AND ENDURANCE: Primary | ICD-10-CM

## 2024-02-20 DIAGNOSIS — R29.898 DECREASED STRENGTH INVOLVING KNEE JOINT: ICD-10-CM

## 2024-02-20 PROCEDURE — 97110 THERAPEUTIC EXERCISES: CPT | Mod: PO,CQ

## 2024-02-20 NOTE — PROGRESS NOTES
"[facility]   Physical Therapy Treatment Note     Name: Kenji Malone  Clinic Number: [id number]    Therapy Diagnosis:   Encounter Diagnoses   Name Primary?    Impaired functional mobility, balance, gait, and endurance Yes    Decreased strength involving knee joint     Impaired weight bearing            Physician: Order, Paper    Visit Date: 2/20/2024    Physician who did surgery: Mauricio Jaimes MD     Physician Orders: PT Eval and Treat   Medical Diagnosis from Referral: Postoperative state  Evaluation Date: 2/1/2024  Authorization Period Expiration: 12/31/2024  Plan of Care Expiration: 3/31/2024  Progress Note Due: 3/1/2024  Visit # / Visits authorized: 4/ 20(+eval)  FOTO: 1/3    PTA Visit #: 1/5     Time In: 1615    Time Out: 1700  Total Billable Time: 45  minutes    Subjective     Pt reports: " I'm doing good, I just need to use the rest room first please."   He was compliant with home exercise program.  Response to previous treatment: felt fine  Functional change: ongoing    Pain: 0/10  Location:  N/A     OBJECTIVE     Objective Measures updated at progress report unless specified.     Pt presents to session via wheelchair with his mom. Pt wearing hinged knee brace to R LE in "locked" position.    Treatment     Kenji received the treatments listed below:    Pt asked to use the rest room prior to tx session, pt able to perform all tasks Independently with therapist waiting outside the door.    therapeutic exercises to develop strength, endurance, ROM, flexibility, posture, and core stabilization for 45 minutes including:    Stand pivot transfers w/c<> mat with mod I  Sit<> supine with mod I    Pt doffs knee brace for the following exercises:   2 x 10 reps R LE QS with 3 sec hold  2 x 10 reps R LE LAQ with 3 sec hold   3 x 10 reps supine R LE AA heel slides, 2 sec hold, pt reports discomfort with max knee flexion  2 x 10 reps supine R LE AA SLR~ added to HEP  2 x 10 reps supine R LE " "AA hip and knee flexion/extension    Pt dons knee brace for the remainder of session with knee joint "unlocked":     Ambulation trial: ~ 120 feet x 2 trials around gym using RW and SBA only from PT, PT cues patient to move walker more anteriorly to avoid stepping past wheels and to increase L step length       Home Exercises Provided and Patient Education Provided     Education provided:   - home exercise program  -2/16/24: PT provided new exercise for home( SLR). PT also recommended that pt " unlock" his brace at all times( to encourage improved R knee flexion) and encouraged him to ambulate more at home with his RW. Education also provided to pt and mother regarding appropriate brace position.    Written Home Exercises Provided: yes.  Exercises were reviewed and Kenji was able to demonstrate them prior to the end of the session.  Kenji demonstrated good  understanding of the education provided.     See EMR under Patient Instructions for exercises provided 2/6/2024; 2/16/24    Assessment     Joshua performed very well during today's session and he was able to increase his gait trials.  Joshua cont to require AAROM for supine exercises and required verbal cues for correct hand placement for sit to stand transfers as well as pushing the walker more forward so his R foot didn't advance past the walker.  Cont with plan of care.      Kenji Is progressing well towards his goals.   Pt prognosis is Good.     Pt will continue to benefit from skilled outpatient physical therapy to address the deficits listed in the problem list box on initial evaluation, provide pt/family education and to maximize pt's level of independence in the home and community environment.     Pt's spiritual, cultural and educational needs considered and pt agreeable to plan of care and goals.     Anticipated barriers to physical therapy: co-morbidities    Goals:  Short Term Goals: 4 weeks   Patient will be independent with home exercise program. " ongoing  Patient will be able to tolerate standing for at least 5 minutes with LRAD in order to improve function at home. ongoing  Patient will be able to ambulate at least 50 feet with LRAD and CGA only in order to improve his independence at home. ongoing  Patient will improve R quad strength to at least 4-/5 in order to improve his ability to tolerate standing.ongoing  Patient will be able to complete TUG, SSWS, MCTSIB, and 5x STS testing and set appropriate long term goals.ongoing     Long Term Goals: 8 weeks   Patient will improve FOTO to at least 39 in order to demonstrate improved perception of his abilities. ongoing  Patient will be able to tolerate 5 minutes of standing balance without AD in order to improve function at home. ongoing  Patient will be able to ambulate at least 300' with LRAD or no AD at [facility] in order to return to job at [employer]. ongoing  Patient will be able to improve R knee flexion to at least 110 degrees in order to demonstrate normal ROM in this extremity.ongoing    Plan     Continue to work on ambulation and stair negotiation as well as ROM of the R LE to the patient's tolerance.     Marian Mercado, PTA   2/20/2024

## 2024-02-21 ENCOUNTER — OUTPATIENT CASE MANAGEMENT (OUTPATIENT)
Dept: ADMINISTRATIVE | Facility: OTHER | Age: 19
End: 2024-02-21
Payer: MEDICAID

## 2024-02-22 NOTE — PROGRESS NOTES
Outpatient Care Management   - Care Plan Follow Up    Patient: Kenji Malone  MRN:  [id number]  Date of Service:  2/22/2024  Completed by:  Tsering Manzo LCSW  Referral Date: 12/12/2023    Reason for Visit   Patient presents with    Other     2/21/2024  1st attempt to complete Follow-Up  for Outpatient Care Management, left message.      2/22/2024  2nd attempt to complete Follow-Up  for Outpatient Care Management, left message.  Will attempt to establish contact at next therapy appt.         Brief Summary: Pt has started therapy and is attending appts. Was unable to check in during last therapy appt and mother is not returning calls. Will check in at next appt 2/27.    Future Appointments   Date Time Provider Department Center   2/27/2024  5:00 PM Marian Mercado, PTA [facility]   2/29/2024  3:30 PM Jeffrey Faganrielle, PT [facility]   3/5/2024  2:30 PM Marian Mercado, PTA [facility]   3/7/2024  2:30 PM Fagan, Laly, PT [facility]   3/12/2024  2:30 PM Marian Mercado, PTA [facility]   3/14/2024  2:30 PM David Frankel, PT [facility]   3/19/2024  2:30 PM Fagan, Laly, PT [facility]   3/21/2024  2:30 PM Marian Mercado, PTA [facility]   3/26/2024  2:30 PM Marian Mercado, PTA [facility]   3/28/2024  2:30 PM Fagan, Laly, PT [facility]   4/2/2024  2:30 PM Fagan, Laly, PT [facility]   4/4/2024  2:30 PM Fagan, Laly, PT [facility]   4/9/2024  2:30 PM Fagan, Laly, PT [facility]     Tsering Manzo LCSW  Neuro Therapy   [facility]  [phone]

## 2024-02-27 ENCOUNTER — CLINICAL SUPPORT (OUTPATIENT)
Dept: REHABILITATION | Facility: HOSPITAL | Age: 19
End: 2024-02-27
Payer: MEDICAID

## 2024-02-27 DIAGNOSIS — Z74.09 IMPAIRED FUNCTIONAL MOBILITY, BALANCE, GAIT, AND ENDURANCE: Primary | ICD-10-CM

## 2024-02-27 DIAGNOSIS — R26.89 IMPAIRED WEIGHT BEARING: ICD-10-CM

## 2024-02-27 DIAGNOSIS — R29.898 DECREASED STRENGTH INVOLVING KNEE JOINT: ICD-10-CM

## 2024-02-27 PROCEDURE — 97110 THERAPEUTIC EXERCISES: CPT | Mod: PO,CQ

## 2024-02-27 NOTE — PROGRESS NOTES
"[facility]   Physical Therapy Treatment Note     Name: Kenji Malone  Clinic Number: [id number]    Therapy Diagnosis:   Encounter Diagnoses   Name Primary?    Impaired functional mobility, balance, gait, and endurance Yes    Decreased strength involving knee joint     Impaired weight bearing            Physician: Order, Paper    Visit Date: 2/27/2024    Physician who did surgery: Mauricio Jaimes MD     Physician Orders: PT Eval and Treat   Medical Diagnosis from Referral: Postoperative state  Evaluation Date: 2/1/2024  Authorization Period Expiration: 12/31/2024  Plan of Care Expiration: 3/31/2024  Progress Note Due: 3/1/2024  Visit # / Visits authorized: 5/ 20(+eval)  FOTO: 1/3    PTA Visit #: 2/5     Time In: 1700  Time Out: 1745  Total Billable Time: 45  minutes    Subjective     Pt reports: " I'm doing good."  He was compliant with home exercise program.  Response to previous treatment: felt fine  Functional change: ongoing    Pain: 0/10  Location:  N/A     OBJECTIVE     Objective Measures updated at progress report unless specified.     Pt presents to session via wheelchair with his mom. Pt wearing hinged knee brace to R LE in "locked" position.    Treatment     Kenji received the treatments listed below:      therapeutic exercises to develop strength, endurance, ROM, flexibility, posture, and core stabilization for 45 minutes including:    Stand pivot transfers w/c<> mat with mod I  Sit<> supine with mod I    Pt doffs knee brace for the following exercises:   3 x 10 reps R LE QS with 3 sec hold  2 x 10 reps R LE SAQ with 3 sec hold   3 x 10 reps supine R LE AA heel slides, 2 sec hold, pt reports discomfort with max knee flexion  3 x 10 reps supine R LE AA SLR  2 x 10 reps supine R LE AA hip and knee flexion/extension    Sitting EOM:  3 x 10 reps of R LE LAQ with 3 sec hold  3 x 5 reps of sit to stands from EOM, VCs for correct hand placement    Pt dons knee brace for the remainder " "of session with knee joint "unlocked":     Ambulation trial: ~ 120 feet x 2 trials around gym and 126ft using RW and SBA only from PT, PT cues patient to move walker more anteriorly to avoid stepping past wheels and to increase L step length       Home Exercises Provided and Patient Education Provided     Education provided:   - home exercise program  -2/16/24: PT provided new exercise for home( SLR). PT also recommended that pt " unlock" his brace at all times( to encourage improved R knee flexion) and encouraged him to ambulate more at home with his RW. Education also provided to pt and mother regarding appropriate brace position.    Written Home Exercises Provided: yes.  Exercises were reviewed and Kenji was able to demonstrate them prior to the end of the session.  Kenji demonstrated good  understanding of the education provided.     See EMR under Patient Instructions for exercises provided 2/6/2024; 2/16/24    Assessment     Joshua tolerated his tx session well and he cont to progress slowly towards his goals.  Joshua was able to increase his repetitions on supine therex and began sit to stand trails from the EOM, with verbal cues for correct hand placement.  Joshua was able to increase his gait distance today.  Cont with plan of care.     Kenji Is progressing well towards his goals.   Pt prognosis is Good.     Pt will continue to benefit from skilled outpatient physical therapy to address the deficits listed in the problem list box on initial evaluation, provide pt/family education and to maximize pt's level of independence in the home and community environment.     Pt's spiritual, cultural and educational needs considered and pt agreeable to plan of care and goals.     Anticipated barriers to physical therapy: co-morbidities    Goals:  Short Term Goals: 4 weeks   Patient will be independent with home exercise program. ongoing  Patient will be able to tolerate standing for at least 5 minutes with LRAD in order to " improve function at home. ongoing  Patient will be able to ambulate at least 50 feet with LRAD and CGA only in order to improve his independence at home. ongoing  Patient will improve R quad strength to at least 4-/5 in order to improve his ability to tolerate standing.ongoing  Patient will be able to complete TUG, SSWS, MCTSIB, and 5x STS testing and set appropriate long term goals.ongoing     Long Term Goals: 8 weeks   Patient will improve FOTO to at least 39 in order to demonstrate improved perception of his abilities. ongoing  Patient will be able to tolerate 5 minutes of standing balance without AD in order to improve function at home. ongoing  Patient will be able to ambulate at least 300' with LRAD or no AD at [facility] in order to return to job at [employer]. ongoing  Patient will be able to improve R knee flexion to at least 110 degrees in order to demonstrate normal ROM in this extremity.ongoing    Plan     Continue to work on ambulation and stair negotiation as well as ROM of the R LE to the patient's tolerance.     Marian Mercado, PTA   2/27/2024

## 2024-02-28 ENCOUNTER — OUTPATIENT CASE MANAGEMENT (OUTPATIENT)
Dept: ADMINISTRATIVE | Facility: OTHER | Age: 19
End: 2024-02-28
Payer: MEDICAID

## 2024-02-28 NOTE — PROGRESS NOTES
Outpatient Care Management   - Care Plan Follow Up    Patient: Kenji Malone  MRN:  [id number]  Date of Service:  2/28/2024  Completed by:  Tsering Manzo LCSW  Referral Date: 12/12/2023    Reason for Visit   Patient presents with    [facility] SW Follow Up Call    Case Closure     2/27/24       Brief Summary: SW met with Pt and Pt mother before their session today. Pt mother reported they have no other needs now that Pt has started his therapy. She is agreeable to closing.    Future Appointments   Date Time Provider Department Center   2/29/2024  3:30 PM Rylan, Laly, PT [facility]   3/5/2024  2:30 PM Marian Mercado, PTA [facility]   3/7/2024  2:30 PM Fagan, Laly, PT [facility]   3/12/2024  2:30 PM Marian Mercado, PTA [facility]   3/14/2024  2:30 PM David Frankel, PT [facility]   3/19/2024  2:30 PM Fagan, Laly, PT [facility]   3/21/2024  2:30 PM Marian Mercado, PTA [facility]   3/26/2024  2:30 PM Marian Mercado, PTA [facility]   3/28/2024  2:30 PM Fagan, Laly, PT [facility]   4/2/2024  2:30 PM Fagan, Laly, PT [facility]   4/4/2024  2:30 PM Fagan, Laly, PT [facility]   4/9/2024  2:30 PM Fagan, Laly, PT [facility]     Tsering Manzo LCSW  Neuro Therapy   [facility]  [phone]

## 2024-02-29 ENCOUNTER — CLINICAL SUPPORT (OUTPATIENT)
Dept: REHABILITATION | Facility: HOSPITAL | Age: 19
End: 2024-02-29
Payer: MEDICAID

## 2024-02-29 DIAGNOSIS — R29.898 DECREASED STRENGTH INVOLVING KNEE JOINT: ICD-10-CM

## 2024-02-29 DIAGNOSIS — R26.89 IMPAIRED WEIGHT BEARING: ICD-10-CM

## 2024-02-29 DIAGNOSIS — Z74.09 IMPAIRED FUNCTIONAL MOBILITY, BALANCE, GAIT, AND ENDURANCE: Primary | ICD-10-CM

## 2024-02-29 PROCEDURE — 97110 THERAPEUTIC EXERCISES: CPT | Mod: PO

## 2024-02-29 NOTE — PROGRESS NOTES
"[facility]   Physical Therapy Treatment Note     Name: Kenji Malone  Clinic Number: [id number]    Therapy Diagnosis:   Encounter Diagnoses   Name Primary?    Impaired functional mobility, balance, gait, and endurance Yes    Decreased strength involving knee joint     Impaired weight bearing        Physician: Order, Paper    Visit Date: 2/29/2024    Physician who did surgery: Mauricio Jaimes MD     Physician Orders: PT Eval and Treat   Medical Diagnosis from Referral: Postoperative state  Evaluation Date: 2/1/2024  Authorization Period Expiration: 12/31/2024  Plan of Care Expiration: 3/31/2024  Progress Note Due: 3/1/2024  Visit # / Visits authorized: 5/ 20(+eval)  FOTO: 1/3    Precautions: Standard and Fall     PTA Visit #: 0/5     Time In: 15:38  Time Out: 16:16  Total Billable Time: 38 minutes    Subjective     Pt reports: he is doing well. He has been doing the exercises at home but knows his right leg still needs to get a little bit stronger.   He was compliant with home exercise program.  Response to previous treatment: felt fine  Functional change: ongoing    Pain: 0/10  Location:  N/A     OBJECTIVE     Objective Measures updated at progress report unless specified.     Pt presents to session via wheelchair. Pt wearing hinged knee brace to R LE in "locked" position.    Measurements taken with brace off.   RLE ROM:  Knee flexion = 66 deg before pain  Knee extension = resting in 6 deg flexion but able to actively achieve 0 deg    LLE ROM:  Knee flexion = 125 deg  Knee extension = lacking 15 deg even with overpressure    Lower Extremity Strength~ tested in sitting  Right LE 10/24/23 previous POC Eval 2/6/2024 2/29/24 Left LE 10/24/23  Previous POC Eval 2/6/2024 2/9/24   Hip flexion:  5/5 5/5 5/5 Hip flexion: 5/5 5/5 5/5   Knee extension: 5/5 3/5 3+/5 Knee extension: 5/5 5/5 5/5   Knee flexion: 4+/5 4-/5* 4/5* Knee flexion: 3/5 3/5 3+/5   Ankle dorsiflexion:  1+/5 1/5 2/5 Ankle " "dorsiflexion: trace trace/5 1/5   Ankle plantarflexion:  1/5 1/5 1/5 Ankle plantarflexion: trace trace/5 1/5   Hip abduction: 3+/5 to 4-/5* 3+/5 3+/5 Hip abduction: 3+/5* 3+/5 3+/5   Hip adduction: 5/5 5/5 5/5 Hip adduction 5/5 5/5 5/5   Hip extension: 4/5 3+/5 4/5 Hip extension: 5/5 5/5 4-/5   *within range of motion    Standing tolerance: 33 sec unsupported standing (no AD)     Gait Assessment:   - AD used: RW  - Assistance: supervision  - Distance: limited community (100-200 ft)   - Stairs: asc/desc using 1 HR and lateral stepping foot to foot technique    Self-Selected Gait Speed:   Trial 1: 6 meters in 17 seconds, 0.35 m/s   Trial 2: 6 meters in 17 seconds, 0.35 m/s   Avg = 0.35 m/s      2-Minute Walk Test  AD used: rolling walker  Distance: 167 ft    Intake Outcome Measure for FOTO Intake Survey     Therapist reviewed FOTO scores for Kenji Malone on 2/29/2024.   FOTO documents entered into [email or web address] - see Media section.     Intake Score: 21%  Current Score: 53%        Treatment     Kenji received the treatments listed below:      therapeutic exercises to develop strength, endurance, ROM, flexibility, posture, and core stabilization for 38 minutes including:    Objective testing as above      Home Exercises Provided and Patient Education Provided     Education provided:   - home exercise program  -2/16/24: PT provided new exercise for home( SLR). PT also recommended that pt " unlock" his brace at all times( to encourage improved R knee flexion) and encouraged him to ambulate more at home with his RW. Education also provided to pt and mother regarding appropriate brace position.    Written Home Exercises Provided: yes.  Exercises were reviewed and Kenji was able to demonstrate them prior to the end of the session.  Kenji demonstrated good  understanding of the education provided.     See EMR under Patient Instructions for exercises provided 2/6/2024; 2/16/24    Assessment     Joshua demonstrates mild gains in lower " extremity strength though continues to be limited by range of motion limitations as well as pain in his right leg. He is now able to asc/desc steps utilizing a lateral step-to method, but hopes to negotiate steps fwd facing once knee range of motion improves. While his gait endurance appears to have improved, he is still limited by standing balance and continues to rely on use of a rolling walker at this time for steadying support. He is making good consistent gains towards goals and remains appropriate for outpatient neuro PT to maximize lower extremity strength as well as independence and safety with functional mobility tasks.     Kenji Is progressing well towards his goals.   Pt prognosis is Good.     Pt will continue to benefit from skilled outpatient physical therapy to address the deficits listed in the problem list box on initial evaluation, provide pt/family education and to maximize pt's level of independence in the home and community environment.     Pt's spiritual, cultural and educational needs considered and pt agreeable to plan of care and goals.     Anticipated barriers to physical therapy: co-morbidities    Goals:  Short Term Goals: 4 weeks   Patient will be independent with home exercise program. MET 2/29/24  Patient will be able to tolerate standing for at least 5 minutes with LRAD in order to improve function at home. ongoing  Patient will be able to ambulate at least 50 feet with LRAD and CGA only in order to improve his independence at home. MET 2/29/24  Patient will improve R quad strength to at least 4-/5 in order to improve his ability to tolerate standing.ongoing  Patient will be able to complete TUG, SSWS, MCTSIB, and 5x STS testing and set appropriate long term goals.ongoing     Long Term Goals: 8 weeks   Patient will improve FOTO to at least 39% in order to demonstrate improved perception of his abilities. MET 2/29/24  Patient will be able to tolerate 5 minutes of standing balance  without AD in order to improve function at home. ongoing  Patient will be able to ambulate at least 300' with LRAD or no AD at [facility] in order to return to job at [employer]. ongoing  Patient will be able to improve R knee flexion to at least 110 degrees in order to demonstrate normal ROM in this extremity.ongoing  Pt will improve self selected walking speed (SSWS) with Walker to at least 0.4 m/s for improved safety with home and community ambulation. Added 2/29/24  Pt to perform 2 minute walk test for 200 feet or greater to improve gait speed & endurance. Added 2/29/24      Plan     Continue to work on ambulation and stair negotiation as well as ROM of the R LE to the patient's tolerance.     Laly Fagan, PT   2/29/2024

## 2024-03-05 ENCOUNTER — CLINICAL SUPPORT (OUTPATIENT)
Dept: REHABILITATION | Facility: HOSPITAL | Age: 19
End: 2024-03-05
Payer: MEDICAID

## 2024-03-05 DIAGNOSIS — R29.898 DECREASED STRENGTH INVOLVING KNEE JOINT: ICD-10-CM

## 2024-03-05 DIAGNOSIS — Z74.09 IMPAIRED FUNCTIONAL MOBILITY, BALANCE, GAIT, AND ENDURANCE: Primary | ICD-10-CM

## 2024-03-05 DIAGNOSIS — R26.89 IMPAIRED WEIGHT BEARING: ICD-10-CM

## 2024-03-05 PROCEDURE — 97110 THERAPEUTIC EXERCISES: CPT | Mod: PO,CQ

## 2024-03-05 NOTE — PROGRESS NOTES
"[facility]   Physical Therapy Treatment Note     Name: eKnji Malone  Clinic Number: [id number]    Therapy Diagnosis:   Encounter Diagnoses   Name Primary?    Impaired functional mobility, balance, gait, and endurance Yes    Decreased strength involving knee joint     Impaired weight bearing        Physician: Order, Paper    Visit Date: 3/5/2024    Physician who did surgery: Mauricio Jaimes MD     Physician Orders: PT Eval and Treat   Medical Diagnosis from Referral: Postoperative state  Evaluation Date: 2/1/2024  Authorization Period Expiration: 12/31/2024  Plan of Care Expiration: 3/31/2024  Progress Note Due: 3/1/2024  Visit # / Visits authorized: 6/ 20(+eval)  FOTO: 1/3    Precautions: Standard and Fall     PTA Visit #: 1/5     Time In: 14:30  Time Out: 15:15  Total Billable Time: 45  minutes    Subjective     Pt reports: " I did some more exercises last night on my own."   He was compliant with home exercise program.  Response to previous treatment: felt fine  Functional change: ongoing    Pain: 0/10  Location:  N/A     OBJECTIVE     Objective Measures updated at progress report unless specified.       Treatment     Kenji received the treatments listed below:      therapeutic exercises to develop strength, endurance, ROM, flexibility, posture, and core stabilization for 45 minutes including:      Stand pivot transfers w/c<> mat with mod I  Sit<> supine with mod I     Pt doffs knee brace for the following exercises:   3 x 10 reps R LE QS with 3 sec hold  2 x 10 reps R LE SAQ with 3 sec hold , #1lb cuff weight added   3 x 10 reps supine R LE AA heel slides, 2 sec hold, pt reports discomfort with max knee flexion  3 x 10 reps supine R LE AA SLR  2 x 10 reps supine R LE AA hip and knee flexion/extension     Sitting EOM:  3 x 10 reps of R LE LAQ with 3 sec hold, added #1lb cuff weight  3 x 5 reps of sit to stands from EOM, VCs for correct hand placement     Pt dons knee brace for the " "remainder of session with knee joint "unlocked":      Ambulation trial: ~ 120 feet around gym and 365 ft using RW and SBA only from PT, PT cues patient to move walker more anteriorly to avoid stepping past wheels and to increase L step length        Home Exercises Provided and Patient Education Provided     Education provided:   - home exercise program  -2/16/24: PT provided new exercise for home( SLR). PT also recommended that pt " unlock" his brace at all times( to encourage improved R knee flexion) and encouraged him to ambulate more at home with his RW. Education also provided to pt and mother regarding appropriate brace position.    Written Home Exercises Provided: yes.  Exercises were reviewed and Kenji was able to demonstrate them prior to the end of the session.  Kenji demonstrated good  understanding of the education provided.     See EMR under Patient Instructions for exercises provided 2/6/2024; 2/16/24    Assessment     Joshua tolerated his tx session well and did not have any problems noted.  Joshua was able to progress to #1 cuff weight on  his SAQ and LAQs and was able to increase his gait distance today.  Joshua was educated as per pt chart, he does not need to wear his brace anymore and he was encouraged to not wear it or gradually not wear it as much, but he was hesitant to get rid of it.    Kenji Is progressing well towards his goals.   Pt prognosis is Good.     Pt will continue to benefit from skilled outpatient physical therapy to address the deficits listed in the problem list box on initial evaluation, provide pt/family education and to maximize pt's level of independence in the home and community environment.     Pt's spiritual, cultural and educational needs considered and pt agreeable to plan of care and goals.     Anticipated barriers to physical therapy: co-morbidities    Goals:  Short Term Goals: 4 weeks   Patient will be independent with home exercise program. MET 2/29/24  Patient will be " able to tolerate standing for at least 5 minutes with LRAD in order to improve function at home. ongoing  Patient will be able to ambulate at least 50 feet with LRAD and CGA only in order to improve his independence at home. MET 2/29/24  Patient will improve R quad strength to at least 4-/5 in order to improve his ability to tolerate standing.ongoing  Patient will be able to complete TUG, SSWS, MCTSIB, and 5x STS testing and set appropriate long term goals.ongoing     Long Term Goals: 8 weeks   Patient will improve FOTO to at least 39% in order to demonstrate improved perception of his abilities. MET 2/29/24  Patient will be able to tolerate 5 minutes of standing balance without AD in order to improve function at home. ongoing  Patient will be able to ambulate at least 300' with LRAD or no AD at [facility] in order to return to job at [employer]. ongoing  Patient will be able to improve R knee flexion to at least 110 degrees in order to demonstrate normal ROM in this extremity.ongoing  Pt will improve self selected walking speed (SSWS) with Walker to at least 0.4 m/s for improved safety with home and community ambulation. Added 2/29/24  Pt to perform 2 minute walk test for 200 feet or greater to improve gait speed & endurance. Added 2/29/24      Plan     Continue to work on ambulation and stair negotiation as well as ROM of the R LE to the patient's tolerance.     Marian Mercado, PTA   3/5/2024

## 2024-03-07 ENCOUNTER — CLINICAL SUPPORT (OUTPATIENT)
Dept: REHABILITATION | Facility: HOSPITAL | Age: 19
End: 2024-03-07
Payer: MEDICAID

## 2024-03-07 DIAGNOSIS — R26.89 IMPAIRED WEIGHT BEARING: ICD-10-CM

## 2024-03-07 DIAGNOSIS — R29.898 DECREASED STRENGTH INVOLVING KNEE JOINT: ICD-10-CM

## 2024-03-07 DIAGNOSIS — Z74.09 IMPAIRED FUNCTIONAL MOBILITY, BALANCE, GAIT, AND ENDURANCE: Primary | ICD-10-CM

## 2024-03-07 PROCEDURE — 97110 THERAPEUTIC EXERCISES: CPT | Mod: PO

## 2024-03-07 NOTE — PROGRESS NOTES
"[facility]   Physical Therapy Treatment Note     Name: Kenji Malone  Clinic Number: [id number]    Therapy Diagnosis:   No diagnosis found.      Physician: Order, Paper    Visit Date: 3/7/2024    Physician who did surgery: Mauricio Jaimes MD     Physician Orders: PT Eval and Treat   Medical Diagnosis from Referral: Postoperative state  Evaluation Date: 2/1/2024  Authorization Period Expiration: 12/31/2024  Plan of Care Expiration: 3/31/2024  Progress Note Due: 3/31/2024  Visit # / Visits authorized: 8/ 20(+eval)  FOTO: 1/3    Precautions: Standard and Fall     PTA Visit #: 1/5     Time In: 14:***  Time Out: 15:***  Total Billable Time: *** minutes    Subjective     Pt reports: ***  He was compliant with home exercise program.  Response to previous treatment: felt fine  Functional change: ongoing    Pain: 0/10  Location:  N/A     OBJECTIVE     Objective Measures updated at progress report unless specified.       Treatment     Kenji received the treatments listed below:      therapeutic exercises to develop strength, endurance, ROM, flexibility, posture, and core stabilization for *** minutes including:    Stand pivot transfers w/c<> mat with mod I  Sit<> supine with mod I     Pt doffs knee brace for the following exercises:   3 x 10 reps R LE QS with 3 sec hold  2 x 10 reps R LE SAQ with 3 sec hold , #1lb cuff weight added   3 x 10 reps supine R LE AA heel slides, 2 sec hold, pt reports discomfort with max knee flexion  3 x 10 reps supine R LE AA SLR  2 x 10 reps supine R LE AA hip and knee flexion/extension     Sitting EOM:  3 x 10 reps of R LE LAQ with 3 sec hold, added #1lb cuff weight  3 x 5 reps of sit to stands from EOM, VCs for correct hand placement     Pt dons knee brace for the remainder of session with knee joint "unlocked":      Ambulation trial: ~ 120 feet around gym and 365 ft using RW and SBA only from PT, PT cues patient to move walker more anteriorly to avoid stepping " "past wheels and to increase L step length        Home Exercises Provided and Patient Education Provided     Education provided:   - home exercise program  -2/16/24: PT provided new exercise for home( SLR). PT also recommended that pt " unlock" his brace at all times( to encourage improved R knee flexion) and encouraged him to ambulate more at home with his RW. Education also provided to pt and mother regarding appropriate brace position.    Written Home Exercises Provided: yes.  Exercises were reviewed and Kenji was able to demonstrate them prior to the end of the session.  Kenji demonstrated good  understanding of the education provided.     See EMR under Patient Instructions for exercises provided 2/6/2024; 2/16/24    Assessment     Joshua ***    Kenji Is progressing well towards his goals.   Pt prognosis is Good.     Pt will continue to benefit from skilled outpatient physical therapy to address the deficits listed in the problem list box on initial evaluation, provide pt/family education and to maximize pt's level of independence in the home and community environment.     Pt's spiritual, cultural and educational needs considered and pt agreeable to plan of care and goals.     Anticipated barriers to physical therapy: co-morbidities    Goals:  Short Term Goals: 4 weeks   Patient will be independent with home exercise program. MET 2/29/24  Patient will be able to tolerate standing for at least 5 minutes with LRAD in order to improve function at home. ongoing  Patient will be able to ambulate at least 50 feet with LRAD and CGA only in order to improve his independence at home. MET 2/29/24  Patient will improve R quad strength to at least 4-/5 in order to improve his ability to tolerate standing.ongoing  Patient will be able to complete TUG, SSWS, MCTSIB, and 5x STS testing and set appropriate long term goals.ongoing     Long Term Goals: 8 weeks   Patient will improve FOTO to at least 39% in order to demonstrate " improved perception of his abilities. MET 2/29/24  Patient will be able to tolerate 5 minutes of standing balance without AD in order to improve function at home. ongoing  Patient will be able to ambulate at least 300' with LRAD or no AD at [facility] in order to return to job at [employer]. ongoing  Patient will be able to improve R knee flexion to at least 110 degrees in order to demonstrate normal ROM in this extremity.ongoing  Pt will improve self selected walking speed (SSWS) with Walker to at least 0.4 m/s for improved safety with home and community ambulation. Added 2/29/24  Pt to perform 2 minute walk test for 200 feet or greater to improve gait speed & endurance. Added 2/29/24      Plan     Continue to work on ambulation and stair negotiation as well as ROM of the R LE to the patient's tolerance.     Laly Fagan, PT   3/7/2024

## 2024-03-07 NOTE — PROGRESS NOTES
[facility]   Physical Therapy Treatment Note     Name: Kenji Malone  Clinic Number: [id number]    Therapy Diagnosis:   Encounter Diagnoses   Name Primary?    Impaired functional mobility, balance, gait, and endurance Yes    Decreased strength involving knee joint     Impaired weight bearing          Physician: Order, Paper    Visit Date: 3/7/2024    Physician who did surgery: Mauricio Jaimes MD     Physician Orders: PT Eval and Treat   Medical Diagnosis from Referral: Postoperative state  Evaluation Date: 2/1/2024  Authorization Period Expiration: 12/31/2024  Plan of Care Expiration: 3/31/2024  Progress Note Due: 3/31/2024  Visit # / Visits authorized: 8/20(+eval)  FOTO: 1/3    Precautions: Standard and Fall     PTA Visit #: 0/5     Time In: 14:40 (pt arrived 10 minutes late to session; PT unable to accommodate)  Time Out: 15:15  Total Billable Time: 35 minutes    Subjective     Pt reports: He is feeling pretty good today. Denies any complaints at the start of his session    He was compliant with home exercise program.  Response to previous treatment: felt fine  Functional change: ongoing    Pain: 0/10  Location:  N/A     OBJECTIVE     Objective Measures updated at progress report unless specified.       Treatment     Kenji received the treatments listed below:      therapeutic exercises to develop strength, endurance, ROM, flexibility, posture, and core stabilization for 35 minutes including:    Stand pivot transfers w/c<> mat with mod I  Sit<> supine with mod I     Pt doffs knee brace for the following exercises:   3 x 10 reps R LE QS with 3 sec hold  2 x 10 reps R LE SAQ with 3 sec hold, #1lb cuff weight added   3 x 10 reps supine R LE AA heel slides, 2 sec hold, pt reports discomfort with max knee flexion  3 x 10 reps supine R LE AA SLR  2 x 10 reps supine R LE AA hip and knee flexion/extension     Sitting EOM:  3 x 10 reps of R LE LAQ with 3 sec hold, added #1lb cuff weight    Pt  "dons knee brace for the remainder of session with knee joint "unlocked":      Ambulation trial: ~ 250 feet around gym using RW and CGA - SBA only from PT, PT cues patient to move walker more anteriorly to avoid stepping past wheels and to increase L step length      Not today:  3 x 5 reps of sit to stands from EOM, VCs for correct hand placement      Home Exercises Provided and Patient Education Provided     Education provided:   - home exercise program  -2/16/24: PT provided new exercise for home( SLR). PT also recommended that pt " unlock" his brace at all times( to encourage improved R knee flexion) and encouraged him to ambulate more at home with his RW. Education also provided to pt and mother regarding appropriate brace position.    Written Home Exercises Provided: yes.  Exercises were reviewed and Kenji was able to demonstrate them prior to the end of the session.  Kenji demonstrated good  understanding of the education provided.     See EMR under Patient Instructions for exercises provided 2/6/2024; 2/16/24    Assessment     Joshua tolerated today's session well. He showed good quad activation with supine therex and required very minimal active assistance initially to perform exercises as desired. As session continued, he began to show fatigue and required more assistance to maintain knee extension with SLR's. His fatigue translated to his gait trial as he showed more difficulty maintaining right knee extension in stance towards the end of his gait trial. He continues to be appropriate for skilled physical therapy services to maximize his strength and functional mobility.     Kenji Is progressing well towards his goals.   Pt prognosis is Good.     Pt will continue to benefit from skilled outpatient physical therapy to address the deficits listed in the problem list box on initial evaluation, provide pt/family education and to maximize pt's level of independence in the home and community environment. "     Pt's spiritual, cultural and educational needs considered and pt agreeable to plan of care and goals.     Anticipated barriers to physical therapy: co-morbidities    Goals:  Short Term Goals: 4 weeks   Patient will be independent with home exercise program. MET 2/29/24  Patient will be able to tolerate standing for at least 5 minutes with LRAD in order to improve function at home. ongoing  Patient will be able to ambulate at least 50 feet with LRAD and CGA only in order to improve his independence at home. MET 2/29/24  Patient will improve R quad strength to at least 4-/5 in order to improve his ability to tolerate standing.ongoing  Patient will be able to complete TUG, SSWS, MCTSIB, and 5x STS testing and set appropriate long term goals.ongoing     Long Term Goals: 8 weeks   Patient will improve FOTO to at least 39% in order to demonstrate improved perception of his abilities. MET 2/29/24  Patient will be able to tolerate 5 minutes of standing balance without AD in order to improve function at home. ongoing  Patient will be able to ambulate at least 300' with LRAD or no AD at [facility] in order to return to job at [employer]. ongoing  Patient will be able to improve R knee flexion to at least 110 degrees in order to demonstrate normal ROM in this extremity.ongoing  Pt will improve self selected walking speed (SSWS) with Walker to at least 0.4 m/s for improved safety with home and community ambulation. Added 2/29/24  Pt to perform 2 minute walk test for 200 feet or greater to improve gait speed & endurance. Added 2/29/24      Plan     Continue to work on ambulation and stair negotiation as well as ROM of the R LE to the patient's tolerance.     Jess Hedrick, PT   3/7/2024

## 2024-03-12 ENCOUNTER — CLINICAL SUPPORT (OUTPATIENT)
Dept: REHABILITATION | Facility: HOSPITAL | Age: 19
End: 2024-03-12
Payer: MEDICAID

## 2024-03-12 DIAGNOSIS — Z74.09 IMPAIRED FUNCTIONAL MOBILITY, BALANCE, GAIT, AND ENDURANCE: Primary | ICD-10-CM

## 2024-03-12 DIAGNOSIS — R26.89 IMPAIRED WEIGHT BEARING: ICD-10-CM

## 2024-03-12 DIAGNOSIS — R29.898 DECREASED STRENGTH INVOLVING KNEE JOINT: ICD-10-CM

## 2024-03-12 PROCEDURE — 97110 THERAPEUTIC EXERCISES: CPT | Mod: PO,CQ

## 2024-03-12 NOTE — PROGRESS NOTES
"[facility]   Physical Therapy Treatment Note     Name: Kenji Malone  Clinic Number: [id number]    Therapy Diagnosis:   Encounter Diagnoses   Name Primary?    Impaired functional mobility, balance, gait, and endurance Yes    Decreased strength involving knee joint     Impaired weight bearing          Physician: Order, Paper    Visit Date: 3/12/2024    Physician who did surgery: Mauricio Jaimes MD     Physician Orders: PT Eval and Treat   Medical Diagnosis from Referral: Postoperative state  Evaluation Date: 2/1/2024  Authorization Period Expiration: 12/31/2024  Plan of Care Expiration: 3/31/2024  Progress Note Due: 3/31/2024  Visit # / Visits authorized: 9/20(+eval)  FOTO: 1/3    Precautions: Standard and Fall     PTA Visit #: 1/5     Time In: 14:30  Time Out: 15:15  Total Billable Time: 45 minutes    Subjective     Pt reports: " I have been walking more at home and only using the Wheelchair for long distances."     He was compliant with home exercise program.  Response to previous treatment: felt fine  Functional change: ongoing    Pain: 0/10  Location:  N/A     OBJECTIVE     Objective Measures updated at progress report unless specified.       Treatment     Kenji received the treatments listed below:      therapeutic exercises to develop strength, endurance, ROM, flexibility, posture, and core stabilization for 35 minutes including:    Stand pivot transfers w/c<> mat with mod I  Sit<> supine with mod I     Pt doffs knee brace for the following exercises:   3 x 10 reps R LE QS with 3 sec hold  2 x 10 reps R LE SAQ with 3 sec hold, #1lb cuff weight added   3 x 10 reps supine R LE AA heel slides, 2 sec hold, pt reports discomfort with max knee flexion  3 x 10 reps supine R LE AA SLR  2 x 10 reps supine R LE AA hip and knee flexion/extension     Sitting EOM:  3 x 10 reps of R LE LAQ with 3 sec hold, added #1lb cuff weight    Pt dons knee brace for the remainder of session with knee joint " ""unlocked":      Ambulation trial: ~ 325 x 2 trials and 200 feet around gym using RW and CGA - SBA only from PT, PT cues patient to move walker more anteriorly to avoid stepping past wheels and to increase L step length          Home Exercises Provided and Patient Education Provided     Education provided:   - home exercise program  -2/16/24: PT provided new exercise for home( SLR). PT also recommended that pt " unlock" his brace at all times( to encourage improved R knee flexion) and encouraged him to ambulate more at home with his RW. Education also provided to pt and mother regarding appropriate brace position.    Written Home Exercises Provided: yes.  Exercises were reviewed and Kenji was able to demonstrate them prior to the end of the session.  Kenji demonstrated good  understanding of the education provided.     See EMR under Patient Instructions for exercises provided 2/6/2024; 2/16/24    Assessment     Joshua tolerated today's session well and he cont to slowly progress towards his goals.  Joshua was able to increase his gait distance and gait trials.  Begin standing balance next tx session if able.  Cont with plan of care.     Kenji Is progressing well towards his goals.   Pt prognosis is Good.     Pt will continue to benefit from skilled outpatient physical therapy to address the deficits listed in the problem list box on initial evaluation, provide pt/family education and to maximize pt's level of independence in the home and community environment.     Pt's spiritual, cultural and educational needs considered and pt agreeable to plan of care and goals.     Anticipated barriers to physical therapy: co-morbidities    Goals:  Short Term Goals: 4 weeks   Patient will be independent with home exercise program. MET 2/29/24  Patient will be able to tolerate standing for at least 5 minutes with LRAD in order to improve function at home. ongoing  Patient will be able to ambulate at least 50 feet with LRAD and CGA " only in order to improve his independence at home. MET 2/29/24  Patient will improve R quad strength to at least 4-/5 in order to improve his ability to tolerate standing.ongoing  Patient will be able to complete TUG, SSWS, MCTSIB, and 5x STS testing and set appropriate long term goals.ongoing     Long Term Goals: 8 weeks   Patient will improve FOTO to at least 39% in order to demonstrate improved perception of his abilities. MET 2/29/24  Patient will be able to tolerate 5 minutes of standing balance without AD in order to improve function at home. ongoing  Patient will be able to ambulate at least 300' with LRAD or no AD at [facility] in order to return to job at [employer]. ongoing  Patient will be able to improve R knee flexion to at least 110 degrees in order to demonstrate normal ROM in this extremity.ongoing  Pt will improve self selected walking speed (SSWS) with Walker to at least 0.4 m/s for improved safety with home and community ambulation. Added 2/29/24  Pt to perform 2 minute walk test for 200 feet or greater to improve gait speed & endurance. Added 2/29/24      Plan     Continue to work on ambulation and stair negotiation as well as ROM of the R LE to the patient's tolerance.     Marian Mercado, PTA   3/12/2024

## 2024-03-13 NOTE — PROGRESS NOTES
"[facility]   Physical Therapy Treatment Note     Name: Kenji Malone  Clinic Number: [id number]    Therapy Diagnosis:   Encounter Diagnoses   Name Primary?    Impaired functional mobility, balance, gait, and endurance Yes    Decreased strength involving knee joint     Impaired weight bearing            Physician: Order, Paper    Visit Date: 3/14/2024    Physician who did surgery: Mauricio Jaimes MD     Physician Orders: PT Eval and Treat   Medical Diagnosis from Referral: Postoperative state  Evaluation Date: 2/1/2024  Authorization Period Expiration: 12/31/2024  Plan of Care Expiration: 3/31/2024  Progress Note Due: 3/31/2024  Visit # / Visits authorized: 10/ 20(+eval)  FOTO: 2/3    PTA Visit #: 0/5     Time In: 1430     Time Out: 1515    Total Billable Time: 45   minutes    Subjective     Pt reports: He is fine this afternoon. Denies any pain. States he is getting an X-ray at [facility] in  on 3/25/24 to see how his R femur is healing.  He was compliant with home exercise program.  Response to previous treatment: felt fine  Functional change: ongoing    Pain: 0/10  Location:  N/A     OBJECTIVE     Objective Measures updated at progress report unless specified.     Pt presents to session via wheelchair. Pt wearing hinged knee brace to R LE in partially "locked" position. Brace axis is well inferior to knee joint.    Treatment     Kenji received the treatments listed below:      therapeutic exercises to develop strength, endurance, ROM, flexibility, posture, and core stabilization for 45 minutes including:    Stand pivot transfers w/c<> mat with mod I  Sit<> supine with independence x 2 trials    Pt doffs knee brace for the following exercises:   3 x 10 reps R LE QS with 3 sec hold~ towel placed beneath knee and R shoe removed  3 x 10 reps R LE LAQ with 3 sec hold~ 2 # weight added  3 x 10 reps supine R LE AA heel slides~ improved end range knee flexion noted  3 x 10 reps " "supine R LE AA SLR~ extensor lag noted  3 x 10 reps supine R LE AA hip and knee flexion/extension with resistance applied during extension phase  1 x 5 reps of passive knee flexion with PT holding hip at 90 degrees    2 x 10 trials of sit<> stand from mat with pt holding back of gold chair, NO R knee brace, CGA/SBA and cues for symmetrical LE weight bearing      Ambulation trial: ~ 334 feet around gym and in hallway using RW and S only from PT       X 2 trials stair negotiation(4 steps) with B UE on each rail, L LE leading when ascending and R LE leading when descending, SBA for safety      Home Exercises Provided and Patient Education Provided     Education provided:   - home exercise program  -2/16/24: PT provided new exercise for home( SLR). PT also recommended that pt " unlock" his brace at all times( to encourage improved R knee flexion) and encouraged him to ambulate more at home with his RW. Education also provided to pt and mother regarding appropriate brace position.  -3/14/24: PT provided considerable education regarding pt's need to transition out of his current knee brace. PT even pointed to his ortho visit to [facility] on 1/25/24~ recommendation at that time was for pt to begin removing his brace. However, pt continues to wear his hinged knee brace(even during sleep). PT recommended pt work on sit<> stand trials at home without use of brace and to begin sleeping without brace.    Written Home Exercises Provided: yes.  Exercises were reviewed and Kenji was able to demonstrate them prior to the end of the session.  Kenji demonstrated good  understanding of the education provided.     See EMR under Patient Instructions for exercises provided 2/6/2024; 2/16/24    Assessment     Joshua performed very well during today's session. PT continues to encourage him to wean out of his hinged knee brace but he remains hesitant. He completed all mat exercises for R quad strengthening and knee ROM with seemingly more ease " compared to previous session with this therapist. He still struggles to complete SLR without an extensor lag, but his QS looks better. He also performed LAQ with 2 # ankle weight and good quad control. He did well to ascend and descend the training steps in a forward direction. He also is ambulating with less UE reliance on walker. Joshua remains appropriate for skilled PT services in order to improve weight bearing on R LE, ROM, and strength of this LE.    Kenji Is progressing well towards his goals.   Pt prognosis is Good.     Pt will continue to benefit from skilled outpatient physical therapy to address the deficits listed in the problem list box on initial evaluation, provide pt/family education and to maximize pt's level of independence in the home and community environment.     Pt's spiritual, cultural and educational needs considered and pt agreeable to plan of care and goals.     Anticipated barriers to physical therapy: co-morbidities    Goals:  Short Term Goals: 4 weeks   Patient will be independent with home exercise program. ongoing  Patient will be able to tolerate standing for at least 5 minutes with LRAD in order to improve function at home. ongoing  Patient will be able to ambulate at least 50 feet with LRAD and CGA only in order to improve his independence at home. ongoing  Patient will improve R quad strength to at least 4-/5 in order to improve his ability to tolerate standing.ongoing  Patient will be able to complete TUG, SSWS, MCTSIB, and 5x STS testing and set appropriate long term goals.ongoing     Long Term Goals: 8 weeks   Patient will improve FOTO to at least 39 in order to demonstrate improved perception of his abilities. ongoing  Patient will be able to tolerate 5 minutes of standing balance without AD in order to improve function at home. ongoing  Patient will be able to ambulate at least 300' with LRAD or no AD at [facility] in order to return to job at [employer].  ongoing  Patient will be able to improve R knee flexion to at least 110 degrees in order to demonstrate normal ROM in this extremity.ongoing    Plan     Continue to work on ambulation and stair negotiation as well as ROM of the R LE to the patient's tolerance. Encourage more activities without use of R knee brace.    David Frankel, PT   3/14/2024

## 2024-03-14 ENCOUNTER — CLINICAL SUPPORT (OUTPATIENT)
Dept: REHABILITATION | Facility: HOSPITAL | Age: 19
End: 2024-03-14
Payer: MEDICAID

## 2024-03-14 DIAGNOSIS — Z74.09 IMPAIRED FUNCTIONAL MOBILITY, BALANCE, GAIT, AND ENDURANCE: Primary | ICD-10-CM

## 2024-03-14 DIAGNOSIS — R26.89 IMPAIRED WEIGHT BEARING: ICD-10-CM

## 2024-03-14 DIAGNOSIS — R29.898 DECREASED STRENGTH INVOLVING KNEE JOINT: ICD-10-CM

## 2024-03-14 PROCEDURE — 97110 THERAPEUTIC EXERCISES: CPT | Mod: PO

## 2024-03-19 ENCOUNTER — CLINICAL SUPPORT (OUTPATIENT)
Dept: REHABILITATION | Facility: HOSPITAL | Age: 19
End: 2024-03-19
Payer: MEDICAID

## 2024-03-19 DIAGNOSIS — R26.89 IMPAIRED WEIGHT BEARING: ICD-10-CM

## 2024-03-19 DIAGNOSIS — Z74.09 IMPAIRED FUNCTIONAL MOBILITY, BALANCE, GAIT, AND ENDURANCE: Primary | ICD-10-CM

## 2024-03-19 DIAGNOSIS — R29.898 DECREASED STRENGTH INVOLVING KNEE JOINT: ICD-10-CM

## 2024-03-19 PROCEDURE — 97110 THERAPEUTIC EXERCISES: CPT | Mod: PO

## 2024-03-19 NOTE — PROGRESS NOTES
"[facility]   Physical Therapy Treatment Note     Name: Kenji Malone  Clinic Number: [id number]    Therapy Diagnosis:   Encounter Diagnoses   Name Primary?    Impaired functional mobility, balance, gait, and endurance Yes    Decreased strength involving knee joint     Impaired weight bearing          Physician: Order, Paper    Visit Date: 3/19/2024    Physician who did surgery: Mauricio Jaimes MD     Physician Orders: PT Eval and Treat   Medical Diagnosis from Referral: Postoperative state  Evaluation Date: 2/1/2024  Authorization Period Expiration: 12/31/2024  Plan of Care Expiration: 3/31/2024  Progress Note Due: 3/31/2024  Visit # / Visits authorized: 11/ 20(+eval)  FOTO: 2/3    PTA Visit #: 0/5     Time In: 14:30  Time Out: 15:15    Total Billable Time: 45 minutes    Subjective     Pt reports: he is doing well with no new complaints. He got new x-rays this morning at [facility] and they said it was healing well.     He was compliant with home exercise program.  Response to previous treatment: felt fine  Functional change: ongoing    Pain: 0/10  Location:  N/A     OBJECTIVE     Objective Measures updated at progress report unless specified.     Pt presents to session via wheelchair with mom present for therapy session. Pt wearing hinged knee brace to R LE in partially "locked" position. Brace axis is well inferior to knee joint.    Treatment     Kenji received the treatments listed below:      therapeutic exercises to develop strength, endurance, ROM, flexibility, posture, and core stabilization for 45 minutes including:    In // bars: NO R knee brace   - Ambulation trial without UE support, CGA-min A for 2x15 ft   - 4-point fitter staggered stance hold, 2x30 sec/side ~ touchdown assist as needed  - 4-point fitter step-up RLE leading only, 2x10  ~ 1 upper extremity support on bar    Pt doffs knee brace for the following exercises:   3 x 10 reps R LE QS with 3 sec hold~ towel " "placed beneath knee and R shoe removed  3 x 10 reps R LE LAQ with 3 sec hold~ 3# weight added  3 x 10 reps supine R LE AA heel slides~ improved end range knee flexion noted  3 x 10 reps supine R LE SLR~ extensor lag noted  3 x 10 reps prone R LE AA hip extension     2 x 10 trials of sit<> stand from mat, NO R knee brace, CGA/SBA and cues for symmetrical LE weight bearing      Home Exercises Provided and Patient Education Provided     Education provided:   - home exercise program  -2/16/24: PT provided new exercise for home( SLR). PT also recommended that pt " unlock" his brace at all times( to encourage improved R knee flexion) and encouraged him to ambulate more at home with his RW. Education also provided to pt and mother regarding appropriate brace position.  -3/14/24: PT provided considerable education regarding pt's need to transition out of his current knee brace. PT even pointed to his ortho visit to [facility] on 1/25/24~ recommendation at that time was for pt to begin removing his brace. However, pt continues to wear his hinged knee brace(even during sleep). PT recommended pt work on sit<> stand trials at home without use of brace and to begin sleeping without brace.    Written Home Exercises Provided: yes.  Exercises were reviewed and Kenji was able to demonstrate them prior to the end of the session.  Kenji demonstrated good  understanding of the education provided.     See EMR under Patient Instructions for exercises provided 2/6/2024; 2/16/24    Assessment     Joshua is making slow but steady gains with knee flexion range of motion, mainly limited by increased time spent in hinged knee brace outside of therapy. In addition, he continues to have decreased quad and glute strength and relies heavily on upper extremity support for all standing activities. He would benefit from continued therapy to work on unsupported standing, functional transfers, and improving range of motion/strength of right lower extremity.  "      Kenji Is progressing well towards his goals.   Pt prognosis is Good.     Pt will continue to benefit from skilled outpatient physical therapy to address the deficits listed in the problem list box on initial evaluation, provide pt/family education and to maximize pt's level of independence in the home and community environment.     Pt's spiritual, cultural and educational needs considered and pt agreeable to plan of care and goals.     Anticipated barriers to physical therapy: co-morbidities    Goals:  Short Term Goals: 4 weeks   Patient will be independent with home exercise program. ongoing  Patient will be able to tolerate standing for at least 5 minutes with LRAD in order to improve function at home. ongoing  Patient will be able to ambulate at least 50 feet with LRAD and CGA only in order to improve his independence at home. ongoing  Patient will improve R quad strength to at least 4-/5 in order to improve his ability to tolerate standing.ongoing  Patient will be able to complete TUG, SSWS, MCTSIB, and 5x STS testing and set appropriate long term goals.ongoing     Long Term Goals: 8 weeks   Patient will improve FOTO to at least 39 in order to demonstrate improved perception of his abilities. ongoing  Patient will be able to tolerate 5 minutes of standing balance without AD in order to improve function at home. ongoing  Patient will be able to ambulate at least 300' with LRAD or no AD at [facility] in order to return to job at [employer]. ongoing  Patient will be able to improve R knee flexion to at least 110 degrees in order to demonstrate normal ROM in this extremity.ongoing    Plan     Continue to work on ambulation and stair negotiation as well as ROM of the R LE to the patient's tolerance. Encourage more activities without use of R knee brace.    Laly Fagan, PT   3/19/2024

## 2024-03-20 ENCOUNTER — DOCUMENTATION ONLY (OUTPATIENT)
Dept: REHABILITATION | Facility: HOSPITAL | Age: 19
End: 2024-03-20
Payer: MEDICAID

## 2024-03-20 NOTE — PROGRESS NOTES
PT spoke with Santhosh from [facility] over telephone to clarify MD orders re: hinged knee brace.     Per Dr. Jaimes, Santhosh reports that patient should keep hinged knee brace on until MD receives new imaging and can make a clinical decision. Medical team will follow-up with both patient/patient's mom as well as PT once decision is made in order to further progress patient in therapy.    PT will make note of this for edu in future PT session.    Laly Fagan, PT, DPT, NCS

## 2024-03-21 ENCOUNTER — CLINICAL SUPPORT (OUTPATIENT)
Dept: REHABILITATION | Facility: HOSPITAL | Age: 19
End: 2024-03-21
Payer: MEDICAID

## 2024-03-21 DIAGNOSIS — R29.898 DECREASED STRENGTH INVOLVING KNEE JOINT: ICD-10-CM

## 2024-03-21 DIAGNOSIS — R26.89 IMPAIRED WEIGHT BEARING: ICD-10-CM

## 2024-03-21 DIAGNOSIS — Z74.09 IMPAIRED FUNCTIONAL MOBILITY, BALANCE, GAIT, AND ENDURANCE: Primary | ICD-10-CM

## 2024-03-21 PROCEDURE — 97110 THERAPEUTIC EXERCISES: CPT | Mod: PO,CQ

## 2024-03-21 NOTE — PROGRESS NOTES
"[facility]   Physical Therapy Treatment Note     Name: Kenji Malone  Clinic Number: [id number]    Therapy Diagnosis:   Encounter Diagnoses   Name Primary?    Impaired functional mobility, balance, gait, and endurance Yes    Decreased strength involving knee joint     Impaired weight bearing          Physician: Order, Paper    Visit Date: 3/21/2024    Physician who did surgery: Mauricio Jaimes MD     Physician Orders: PT Eval and Treat   Medical Diagnosis from Referral: Postoperative state  Evaluation Date: 2/1/2024  Authorization Period Expiration: 12/31/2024  Plan of Care Expiration: 3/31/2024  Progress Note Due: 3/31/2024  Visit # / Visits authorized: 11/ 20(+eval)  FOTO: 2/3    PTA Visit #: 0/5     Time In: 14:30  Time Out: 15:15    Total Billable Time: 45 minutes    Subjective     Pt reports: he is doing well with no new complaints. He got new x-rays this morning at [facility] and they said it was healing well.     He was compliant with home exercise program.  Response to previous treatment: felt fine  Functional change: ongoing    Pain: 0/10  Location:  N/A     OBJECTIVE     Objective Measures updated at progress report unless specified.     Pt presents to session via wheelchair with mom present for therapy session. Pt wearing hinged knee brace to R LE in partially "locked" position. Brace axis is well inferior to knee joint.    Treatment     Kenji received the treatments listed below:      therapeutic exercises to develop strength, endurance, ROM, flexibility, posture, and core stabilization for 45 minutes including:    Pt doffs knee brace for the following exercises:   3 x 10 reps R LE QS with 3 sec hold~ towel placed beneath knee and R shoe removed  3 x 10 reps R LE LAQ with 3 sec hold~ 3# weight added  3 x 10 reps supine R LE AA heel slides~ improved end range knee flexion noted  3 x 10 reps supine R LE SLR~ extensor lag noted      3 x 10 reps prone R LE AA hip " "extension   3 x 10 reps of prone R HS curls with AAROM on 3rd set    2 x 10 trials of sit<> stand from mat, with  R knee brace, CGA/SBA cues for symmetrical LE weight bearing    Gait:   Pt ambulated ~200ft with RW and SBA.  Minimal verbal cues for correct step length and upright posture.      Home Exercises Provided and Patient Education Provided     Education provided:   - home exercise program  -2/16/24: PT provided new exercise for home( SLR). PT also recommended that pt " unlock" his brace at all times( to encourage improved R knee flexion) and encouraged him to ambulate more at home with his RW. Education also provided to pt and mother regarding appropriate brace position.  -3/14/24: PT provided considerable education regarding pt's need to transition out of his current knee brace. PT even pointed to his ortho visit to [facility] on 1/25/24~ recommendation at that time was for pt to begin removing his brace. However, pt continues to wear his hinged knee brace(even during sleep). PT recommended pt work on sit<> stand trials at home without use of brace and to begin sleeping without brace.    Written Home Exercises Provided: yes.  Exercises were reviewed and Kenji was able to demonstrate them prior to the end of the session.  Kenji demonstrated good  understanding of the education provided.     See EMR under Patient Instructions for exercises provided 2/6/2024; 2/16/24    Assessment     Joshua tolerated his tx session well and cont to slowly progress towards his goals.  Joshua cont with supine therex and began prone HS curls with AAROM.  Joshua only required minimal verbal cues for correct step length with the Rolling walker.  Cont with plan of care.    Kenji Is progressing well towards his goals.   Pt prognosis is Good.     Pt will continue to benefit from skilled outpatient physical therapy to address the deficits listed in the problem list box on initial evaluation, provide pt/family education and to maximize pt's " level of independence in the home and community environment.     Pt's spiritual, cultural and educational needs considered and pt agreeable to plan of care and goals.     Anticipated barriers to physical therapy: co-morbidities    Goals:  Short Term Goals: 4 weeks   Patient will be independent with home exercise program. ongoing  Patient will be able to tolerate standing for at least 5 minutes with LRAD in order to improve function at home. ongoing  Patient will be able to ambulate at least 50 feet with LRAD and CGA only in order to improve his independence at home. ongoing  Patient will improve R quad strength to at least 4-/5 in order to improve his ability to tolerate standing.ongoing  Patient will be able to complete TUG, SSWS, MCTSIB, and 5x STS testing and set appropriate long term goals.ongoing     Long Term Goals: 8 weeks   Patient will improve FOTO to at least 39 in order to demonstrate improved perception of his abilities. ongoing  Patient will be able to tolerate 5 minutes of standing balance without AD in order to improve function at home. ongoing  Patient will be able to ambulate at least 300' with LRAD or no AD at [facility] in order to return to job at [employer]. ongoing  Patient will be able to improve R knee flexion to at least 110 degrees in order to demonstrate normal ROM in this extremity.ongoing    Plan     Continue to work on ambulation and stair negotiation as well as ROM of the R LE to the patient's tolerance. Encourage more activities without use of R knee brace.    Marian Mercado, PTA   3/21/2024

## 2024-03-28 ENCOUNTER — CLINICAL SUPPORT (OUTPATIENT)
Dept: REHABILITATION | Facility: HOSPITAL | Age: 19
End: 2024-03-28
Payer: MEDICAID

## 2024-03-28 DIAGNOSIS — Z74.09 IMPAIRED FUNCTIONAL MOBILITY, BALANCE, GAIT, AND ENDURANCE: Primary | ICD-10-CM

## 2024-03-28 DIAGNOSIS — R26.89 IMPAIRED WEIGHT BEARING: ICD-10-CM

## 2024-03-28 DIAGNOSIS — R29.898 DECREASED STRENGTH INVOLVING KNEE JOINT: ICD-10-CM

## 2024-03-28 PROCEDURE — 97110 THERAPEUTIC EXERCISES: CPT | Mod: PO

## 2024-03-28 NOTE — PLAN OF CARE
"[facility]   Physical Therapy Treatment Note     Name: Kenji Malone  Clinic Number: [id number]    Therapy Diagnosis:   Encounter Diagnoses   Name Primary?    Impaired functional mobility, balance, gait, and endurance Yes    Decreased strength involving knee joint     Impaired weight bearing        Physician: Order, Paper    Visit Date: 3/28/2024    Physician who did surgery: Mauricio Jaimes MD     Physician Orders: PT Eval and Treat   Medical Diagnosis from Referral: Postoperative state  Evaluation Date: 2/1/2024  Authorization Period Expiration: 12/31/2024  Plan of Care Expiration: 5/23/2024  Progress Note Due: 4/28/2024  Visit # / Visits authorized: 13/ 20(+eval)  FOTO: 2/3    PTA Visit #: 0/5     Time In: 14:30  Time Out: 15:13    Total Billable Time: 43 minutes    Subjective     Pt reports: he is doing more walking in the home and not using the wheelchair as much.     He was compliant with home exercise program.  Response to previous treatment: felt fine  Functional change: ongoing    Pain: 0/10  Location:  N/A     OBJECTIVE     Objective Measures updated at progress report unless specified.     Pt presents to session without wheelchair for therapy session. Pt wearing hinged knee brace to R LE in partially "locked" position. Brace axis is inferior to knee joint. PT reinforced hinged knee brace positioning.     Measurements taken with brace off.   RLE ROM:  Knee flexion = 98 deg before reported pain  Knee extension = resting in 4 deg flexion but able to actively achieve 0 deg     LLE ROM:  Knee flexion = 137 deg  Knee extension = lacking 10 deg even with overpressure     Lower Extremity Strength~ tested in sitting  Right LE 10/24/23 previous POC Eval 2/6/2024    2/29/24 3/28/24 Left LE 10/24/23  Previous POC Eval 2/6/2024    2/9/24 3/28/24   Hip flexion:  5/5 5/5 5/5 5/5 Hip flexion: 5/5 5/5 5/5 5/5   Knee extension: 5/5 3/5 3+/5 4/5 Knee extension: 5/5 5/5 5/5 5/5   Knee flexion: " 4+/5 4-/5* 4/5* 4/5 Knee flexion: 3/5 3/5 3+/5 4-/5   Ankle dorsiflexion:  1+/5 1/5 2/5 2+/5 Ankle dorsiflexion: trace trace/5 1/5 1/5   Ankle plantarflexion:  1/5 1/5 1/5 1/5 Ankle plantarflexion: trace trace/5 1/5 1/5   Hip abduction: 3+/5 to 4-/5* 3+/5 3+/5 3+/5 Hip abduction: 3+/5* 3+/5 3+/5 4-/5   Hip adduction: 5/5 5/5 5/5 5/5 Hip adduction 5/5 5/5 5/5 5/5   Hip extension: 4/5 3+/5 4/5 4/5 Hip extension: 5/5 5/5 4-/5 4/5   *within range of motion     Standing tolerance: 37 sec unsupported standing (no AD)     Gait assessments done with hinged knee brace ON    Gait Assessment:   - AD used: RW  - Assistance: supervision  - Distance: limited community (100-200 ft)   - Stairs: asc/desc using 1 HR and lateral stepping foot to foot technique     Self-Selected Gait Speed:   Trial 1: 6 meters in 14 seconds, 0.43 m/s   Trial 2: 6 meters in 15 seconds, 0.4 m/s   Avg = 0.41 m/s       2-Minute Walk Test  AD used: rolling walker  Distance: 221 ft     Intake Outcome Measure for FOTO Intake Survey     Therapist reviewed FOTO scores for Kenji Malone on 3/28/2024.   FOTO documents entered into [employer] - see Media section.     Intake Score: 21%  Current Score: 59%        Treatment     Kenji received the treatments listed below:      therapeutic exercises to develop strength, endurance, ROM, flexibility, posture, and core stabilization for 43 minutes including:    Objective testing     Pt doffs knee brace for the following exercises:   3 x 10 reps R LE QS with 3 sec hold~ towel placed beneath knee and R shoe removed  3 x 10 reps R LE LAQ with 3 sec hold~ 3# ankle weight added  3 x 10 reps supine R LE AA heel slides~ improved end range knee flexion noted  3 x 10 reps supine R LE SLR~ extensor lag noted  3 x 10 reps prone R LE AA hip extension   3 x 10 reps of prone R HS curls     2 x 10 trials of sit<> stand from mat, without R knee brace, CGA cues for symmetrical LE weight bearing and anterior trunk lean       Home Exercises  "Provided and Patient Education Provided     Education provided:   - home exercise program  -2/16/24: PT provided new exercise for home( SLR). PT also recommended that pt " unlock" his brace at all times( to encourage improved R knee flexion) and encouraged him to ambulate more at home with his RW. Education also provided to pt and mother regarding appropriate brace position.  -3/14/24: PT provided considerable education regarding pt's need to transition out of his current knee brace. PT even pointed to his ortho visit to [facility] on 1/25/24~ recommendation at that time was for pt to begin removing his brace. However, pt continues to wear his hinged knee brace(even during sleep). PT recommended pt work on sit<> stand trials at home without use of brace and to begin sleeping without brace.    Written Home Exercises Provided: Patient instructed to cont prior HEP.    See EMR under Patient Instructions for exercises provided 2/6/2024; 2/16/24    Assessment     Joshua is steadily progressing towards established PT goals, demonstrating improved knee range of motion as well as right lower extremity strength throughout hip and knee. While he still lacks unsupported standing balance for >30 sec consistently, he does appear to better tolerance weight bearing through his right lower extremity. His endurance has also improved with an increase of at least 50 ft in his 2MWT distance. He remains limited by decreased glute strength with an over-reliance on upper extremity support at this time. Overall, he is participating well and remains appropriate for outpatient neuro PT.      Kenji Is progressing well towards his goals.   Pt prognosis is Good.     Pt will continue to benefit from skilled outpatient physical therapy to address the deficits listed in the problem list box on initial evaluation, provide pt/family education and to maximize pt's level of independence in the home and community environment.     Pt's spiritual, cultural and " educational needs considered and pt agreeable to plan of care and goals.     Anticipated barriers to physical therapy: co-morbidities    Goals:  Short Term Goals: 4 weeks   Patient will be independent with home exercise program. MET 3/28/2024  Patient will be able to tolerate standing for at least 5 minutes with LRAD in order to improve function at home. MET 3/28/2024 (with RW)  Patient will be able to ambulate at least 50 feet with LRAD and CGA only in order to improve his independence at home. MET 3/28/2024  Patient will improve R quad strength to at least 4-/5 in order to improve his ability to tolerate standing. MET 3/28/2024  Patient will be able to complete TUG, SSWS, MCTSIB, and 5x STS testing and set appropriate long term goals. Progressing, SSWS completed     Long Term Goals: 8 weeks   Patient will improve FOTO limitation to at least 39% in order to demonstrate improved perception of his abilities. ongoing  Patient will be able to tolerate 5 minutes of standing balance without AD in order to improve function at home. ongoing  Patient will be able to ambulate at least 300' with LRAD or no AD at [facility] in order to return to job at [email or web address]. ongoing  Patient will be able to improve R knee flexion to at least 110 degrees in order to demonstrate normal ROM in this extremity. Progressing    Plan     Plan of care Certification: 3/28/2024 to 5/23/2024.     Outpatient Physical Therapy 2 times weekly for 8 weeks to include the following interventions: Gait Training, Manual Therapy, Moist Heat/ Ice, Neuromuscular Re-ed, Orthotic Management and Training, Patient Education, Self Care, Therapeutic Activities, and Therapeutic Exercise. Plan may be carried out by a PTA.    Continue to work on ambulation and stair negotiation as well as ROM of the R LE to the patient's tolerance. Focus more on standing balance and glute strength.     Laly Fagan, PT   3/28/2024

## 2024-03-28 NOTE — PROGRESS NOTES
"See PT updated POC in plan of care.    Laly Fagan, PT, DPT, NCS    [facility]   Physical Therapy Treatment Note     Name: Kenji Malone  Clinic Number: [id number]    Therapy Diagnosis:   Encounter Diagnoses   Name Primary?    Impaired functional mobility, balance, gait, and endurance Yes    Decreased strength involving knee joint     Impaired weight bearing        Physician: Order, Paper    Visit Date: 3/28/2024    Physician who did surgery: Mauricio Jaimes MD     Physician Orders: PT Eval and Treat   Medical Diagnosis from Referral: Postoperative state  Evaluation Date: 2/1/2024  Authorization Period Expiration: 12/31/2024  Plan of Care Expiration: 5/23/2024  Progress Note Due: 4/28/2024  Visit # / Visits authorized: 13/ 20(+eval)  FOTO: 2/3    PTA Visit #: 0/5     Time In: 14:30  Time Out: 15:13    Total Billable Time: 43 minutes    Subjective     Pt reports: he is doing more walking in the home and not using the wheelchair as much.     He was compliant with home exercise program.  Response to previous treatment: felt fine  Functional change: ongoing    Pain: 0/10  Location:  N/A     OBJECTIVE     Objective Measures updated at progress report unless specified.     Pt presents to session without wheelchair for therapy session. Pt wearing hinged knee brace to R LE in partially "locked" position. Brace axis is inferior to knee joint. PT reinforced hinged knee brace positioning.     Measurements taken with brace off.   RLE ROM:  Knee flexion = 98 deg before reported pain  Knee extension = resting in 4 deg flexion but able to actively achieve 0 deg     LLE ROM:  Knee flexion = 137 deg  Knee extension = lacking 10 deg even with overpressure     Lower Extremity Strength~ tested in sitting  Right LE 10/24/23 previous POC Eval 2/6/2024    2/29/24 3/28/24 Left LE 10/24/23  Previous POC Eval 2/6/2024    2/9/24 3/28/24   Hip flexion:  5/5 5/5 5/5 5/5 Hip flexion: 5/5 5/5 5/5 5/5   Knee " extension: 5/5 3/5 3+/5 4/5 Knee extension: 5/5 5/5 5/5 5/5   Knee flexion: 4+/5 4-/5* 4/5* 4/5 Knee flexion: 3/5 3/5 3+/5 4-/5   Ankle dorsiflexion:  1+/5 1/5 2/5 2+/5 Ankle dorsiflexion: trace trace/5 1/5 1/5   Ankle plantarflexion:  1/5 1/5 1/5 1/5 Ankle plantarflexion: trace trace/5 1/5 1/5   Hip abduction: 3+/5 to 4-/5* 3+/5 3+/5 3+/5 Hip abduction: 3+/5* 3+/5 3+/5 4-/5   Hip adduction: 5/5 5/5 5/5 5/5 Hip adduction 5/5 5/5 5/5 5/5   Hip extension: 4/5 3+/5 4/5 4/5 Hip extension: 5/5 5/5 4-/5 4/5   *within range of motion     Standing tolerance: 37 sec unsupported standing (no AD)     Gait assessments done with hinged knee brace ON    Gait Assessment:   - AD used: RW  - Assistance: supervision  - Distance: limited community (100-200 ft)   - Stairs: asc/desc using 1 HR and lateral stepping foot to foot technique     Self-Selected Gait Speed:   Trial 1: 6 meters in 14 seconds, 0.43 m/s   Trial 2: 6 meters in 15 seconds, 0.4 m/s   Avg = 0.41 m/s       2-Minute Walk Test  AD used: rolling walker  Distance: 221 ft     Intake Outcome Measure for FOTO Intake Survey     Therapist reviewed FOTO scores for Kenji Malone on 3/28/2024.   FOTO documents entered into [employer] - see Media section.     Intake Score: 21%  Current Score: 59%        Treatment     Kenji received the treatments listed below:      therapeutic exercises to develop strength, endurance, ROM, flexibility, posture, and core stabilization for 43 minutes including:    Objective testing     Pt doffs knee brace for the following exercises:   3 x 10 reps R LE QS with 3 sec hold~ towel placed beneath knee and R shoe removed  3 x 10 reps R LE LAQ with 3 sec hold~ 3# ankle weight added  3 x 10 reps supine R LE AA heel slides~ improved end range knee flexion noted  3 x 10 reps supine R LE SLR~ extensor lag noted  3 x 10 reps prone R LE AA hip extension   3 x 10 reps of prone R HS curls     2 x 10 trials of sit<> stand from mat, without R knee brace, CGA cues for  "symmetrical LE weight bearing and anterior trunk lean       Home Exercises Provided and Patient Education Provided     Education provided:   - home exercise program  -2/16/24: PT provided new exercise for home( SLR). PT also recommended that pt " unlock" his brace at all times( to encourage improved R knee flexion) and encouraged him to ambulate more at home with his RW. Education also provided to pt and mother regarding appropriate brace position.  -3/14/24: PT provided considerable education regarding pt's need to transition out of his current knee brace. PT even pointed to his ortho visit to [facility] on 1/25/24~ recommendation at that time was for pt to begin removing his brace. However, pt continues to wear his hinged knee brace(even during sleep). PT recommended pt work on sit<> stand trials at home without use of brace and to begin sleeping without brace.    Written Home Exercises Provided: Patient instructed to cont prior HEP.    See EMR under Patient Instructions for exercises provided 2/6/2024; 2/16/24    Assessment     Joshua is steadily progressing towards established PT goals, demonstrating improved knee range of motion as well as right lower extremity strength throughout hip and knee. While he still lacks unsupported standing balance for >30 sec consistently, he does appear to better tolerance weight bearing through his right lower extremity. His endurance has also improved with an increase of at least 50 ft in his 2MWT distance. He remains limited by decreased glute strength with an over-reliance on upper extremity support at this time. Overall, he is participating well and remains appropriate for outpatient neuro PT.      Kenji Is progressing well towards his goals.   Pt prognosis is Good.     Pt will continue to benefit from skilled outpatient physical therapy to address the deficits listed in the problem list box on initial evaluation, provide pt/family education and to maximize pt's level of " independence in the home and community environment.     Pt's spiritual, cultural and educational needs considered and pt agreeable to plan of care and goals.     Anticipated barriers to physical therapy: co-morbidities    Goals:  Short Term Goals: 4 weeks   Patient will be independent with home exercise program. MET 3/28/2024  Patient will be able to tolerate standing for at least 5 minutes with LRAD in order to improve function at home. MET 3/28/2024 (with RW)  Patient will be able to ambulate at least 50 feet with LRAD and CGA only in order to improve his independence at home. MET 3/28/2024  Patient will improve R quad strength to at least 4-/5 in order to improve his ability to tolerate standing. MET 3/28/2024  Patient will be able to complete TUG, SSWS, MCTSIB, and 5x STS testing and set appropriate long term goals. Progressing, SSWS completed     Long Term Goals: 8 weeks   Patient will improve FOTO limitation to at least 39% in order to demonstrate improved perception of his abilities. ongoing  Patient will be able to tolerate 5 minutes of standing balance without AD in order to improve function at home. ongoing  Patient will be able to ambulate at least 300' with LRAD or no AD at [facility] in order to return to job at [employer]. ongoing  Patient will be able to improve R knee flexion to at least 110 degrees in order to demonstrate normal ROM in this extremity. Progressing    Plan     Plan of care Certification: 3/28/2024 to 5/23/2024.     Outpatient Physical Therapy 2 times weekly for 8 weeks to include the following interventions: Gait Training, Manual Therapy, Moist Heat/ Ice, Neuromuscular Re-ed, Orthotic Management and Training, Patient Education, Self Care, Therapeutic Activities, and Therapeutic Exercise. Plan may be carried out by a PTA.    Continue to work on ambulation and stair negotiation as well as ROM of the R LE to the patient's tolerance. Focus more on standing balance and glute  strength.     Laly Fagan, PT   3/28/2024

## 2024-04-01 NOTE — PROGRESS NOTES
"[facility]   Physical Therapy Treatment Note     Name: Kenji Malone  Clinic Number: [id number]    Therapy Diagnosis:   Encounter Diagnoses   Name Primary?    Impaired functional mobility, balance, gait, and endurance Yes    Decreased strength involving knee joint     Impaired weight bearing          Physician: Order, Paper    Visit Date: 4/2/2024    Physician who did surgery: Mauricio Jaimes MD     Physician Orders: PT Eval and Treat   Medical Diagnosis from Referral: Postoperative state  Evaluation Date: 2/1/2024  Authorization Period Expiration: 12/31/2024  Plan of Care Expiration: 5/23/2024  Progress Note Due: 4/28/2024  Visit # / Visits authorized: 14/ 20(+eval)  FOTO: 2/3    PTA Visit #: 0/5     Time In: 14:34  Time Out: 15:15    Total Billable Time: 41 minutes    Subjective     Pt reports: he thought he was going to be discharged but it was a mix-up. His mom is there with him to clarify what PT is going to do and is relieved to hear that he has been extended.      He was compliant with home exercise program.  Response to previous treatment: felt fine  Functional change: ongoing    Pain: 0/10  Location:  N/A     OBJECTIVE     Objective Measures updated at progress report unless specified.     Pt presents to session without wheelchair for therapy session. Pt wearing hinged knee brace to R LE in "unlocked" position. Brace axis is slightly inferior to knee joint. PT reinforced hinged knee brace positioning.     Treatment     Kenji received the treatments listed below:      therapeutic exercises to develop strength, endurance, ROM, flexibility, posture, and core stabilization for 41 minutes including:    Pt doffs knee brace for the following exercises:   3 x 10 reps R LE QS with 3 sec hold~ towel placed beneath knee and R shoe removed  3 x 10 reps R LE LAQ with 3 sec hold~ 3# ankle weight added  3 x 10 reps supine R LE AA heel slides~ improved end range knee flexion noted  3 x 10 reps " "supine R LE SLR~ extensor lag noted  3 x 10 reps prone R LE AA hip extension   3 x 10 reps of prone R HS curls     Without knee brace:   2 x 10 trials of sit<> stand from mat, without R knee brace, CGA cues for symmetrical LE weight bearing and anterior trunk lean   2 x 30 sec B staggered stance hold with FFE on 4" step, SBA-CGA  2 x 10 R leg leading step-ups onto 4" step with 1 UE support + PT CGA     3 x 30 sec NBOS stance hold, touchdown assist + SBA - with knee brace unlocked    Pt ambulated for 1 gait trial around therapy gym while donning knee brace in unlocked position and using rolling walker. Patient ambulated ~350 ft mod I. PT providing VC for relaxing shoulders and increasing upright trunk positioning.      Home Exercises Provided and Patient Education Provided     Education provided:   - home exercise program  -2/16/24: PT provided new exercise for home( SLR). PT also recommended that pt " unlock" his brace at all times( to encourage improved R knee flexion) and encouraged him to ambulate more at home with his RW. Education also provided to pt and mother regarding appropriate brace position.  -3/14/24: PT provided considerable education regarding pt's need to transition out of his current knee brace. PT even pointed to his ortho visit to [facility] on 1/25/24~ recommendation at that time was for pt to begin removing his brace. However, pt continues to wear his hinged knee brace(even during sleep). PT recommended pt work on sit<> stand trials at home without use of brace and to begin sleeping without brace.    Written Home Exercises Provided: Patient instructed to cont prior HEP.    See EMR under Patient Instructions for exercises provided 2/6/2024; 2/16/24    Assessment     Joshua tolerated today's session well with a focus on unsupported standing to further improve glute activation and standing balance. He continues to gain R knee range of motion into flexion which is helping with functional tasks such as " step-ups. He appears to be progressing steadily with right lower extremity strength as well and continues to be a good candidate for outpatient neuro PT at this time.     Kenji Is progressing well towards his goals.   Pt prognosis is Good.     Pt will continue to benefit from skilled outpatient physical therapy to address the deficits listed in the problem list box on initial evaluation, provide pt/family education and to maximize pt's level of independence in the home and community environment.     Pt's spiritual, cultural and educational needs considered and pt agreeable to plan of care and goals.     Anticipated barriers to physical therapy: co-morbidities    Goals:  Short Term Goals: 4 weeks   Patient will be independent with home exercise program. MET 3/28/2024  Patient will be able to tolerate standing for at least 5 minutes with LRAD in order to improve function at home. MET 3/28/2024 (with RW)  Patient will be able to ambulate at least 50 feet with LRAD and CGA only in order to improve his independence at home. MET 3/28/2024  Patient will improve R quad strength to at least 4-/5 in order to improve his ability to tolerate standing. MET 3/28/2024  Patient will be able to complete TUG, SSWS, MCTSIB, and 5x STS testing and set appropriate long term goals. Progressing, SSWS completed     Long Term Goals: 8 weeks   Patient will improve FOTO limitation to at least 39% in order to demonstrate improved perception of his abilities. ongoing  Patient will be able to tolerate 5 minutes of standing balance without AD in order to improve function at home. ongoing  Patient will be able to ambulate at least 300' with LRAD or no AD at [facility] in order to return to job at [employer]. ongoing  Patient will be able to improve R knee flexion to at least 110 degrees in order to demonstrate normal ROM in this extremity. Progressing    Plan     Continue to work on ambulation and stair negotiation as well as ROM of the R  LE to the patient's tolerance. Focus more on standing balance and glute strength.     Laly Fagan, PT   4/2/2024

## 2024-04-02 ENCOUNTER — CLINICAL SUPPORT (OUTPATIENT)
Dept: REHABILITATION | Facility: HOSPITAL | Age: 19
End: 2024-04-02
Payer: MEDICAID

## 2024-04-02 DIAGNOSIS — R26.89 IMPAIRED WEIGHT BEARING: ICD-10-CM

## 2024-04-02 DIAGNOSIS — Z74.09 IMPAIRED FUNCTIONAL MOBILITY, BALANCE, GAIT, AND ENDURANCE: Primary | ICD-10-CM

## 2024-04-02 DIAGNOSIS — R29.898 DECREASED STRENGTH INVOLVING KNEE JOINT: ICD-10-CM

## 2024-04-02 PROCEDURE — 97110 THERAPEUTIC EXERCISES: CPT | Mod: PO

## 2024-04-03 NOTE — PROGRESS NOTES
"[facility]   Physical Therapy Treatment Note     Name: Kenji Malone  Clinic Number: [id number]    Therapy Diagnosis:   Encounter Diagnoses   Name Primary?    Impaired functional mobility, balance, gait, and endurance Yes    Decreased strength involving knee joint     Impaired weight bearing          Physician: Order, Paper    Visit Date: 4/4/2024    Physician who did surgery: Mauricio Jaimes MD     Physician Orders: PT Eval and Treat   Medical Diagnosis from Referral: Postoperative state  Evaluation Date: 2/1/2024  Authorization Period Expiration: 12/31/2024  Plan of Care Expiration: 5/23/2024  Progress Note Due: 4/28/2024  Visit # / Visits authorized: 15/ 20(+eval)  FOTO: 2/3    PTA Visit #: 0/5     Time In: 14:07  Time Out: 14:49    Total Billable Time: 42 minutes    Subjective     Pt reports: he's doing well today. No new complaints.     He was compliant with home exercise program.  Response to previous treatment: felt fine  Functional change: ongoing    Pain: 0/10  Location:  N/A     OBJECTIVE     Objective Measures updated at progress report unless specified.     Pt presents to session without wheelchair for therapy session. Pt wearing hinged knee brace to R LE in "unlocked" position.     Treatment     Kenji received the treatments listed below:      therapeutic exercises to develop strength, endurance, ROM, flexibility, posture, and core stabilization for 42 minutes including:    Pt doffs knee brace for the following exercises:   3 x 10 reps R LE QS with 3 sec hold~ towel placed beneath knee and R shoe removed  3 x 10 reps R LE LAQ with 3 sec hold~ 3# ankle weight added  3 x 10 reps supine R LE AA heel slides~ improved end range knee flexion noted  3 x 10 reps supine R LE SLR~ extensor lag noted  3 x 10 reps supine glute bridges  3 x 10 reps prone R LE AA hip extension   3 x 10 reps of prone R HS curls     Without knee brace:   2 x 10 trials of sit<> stand from mat, without R knee " "brace, CGA cues for symmetrical LE weight bearing and anterior trunk lean   2 x 10 R leg leading step-ups onto 4" step with 1 UE support + PT CGA     3 x 30 sec NBOS stance hold, touchdown assist + SBA - with knee brace unlocked    Seated KB deadlifts with 10# KB, 3x12 reps    Gait in // bars: with knee brace donned and unlocked - unsupported gait training in // bars (~14 ft), 3x fwd then bwd with PT CGA-min A for steadying and cues to maintain upright trunk positioning       Home Exercises Provided and Patient Education Provided     Education provided:   - home exercise program  -2/16/24: PT provided new exercise for home( SLR). PT also recommended that pt " unlock" his brace at all times( to encourage improved R knee flexion) and encouraged him to ambulate more at home with his RW. Education also provided to pt and mother regarding appropriate brace position.  -3/14/24: PT provided considerable education regarding pt's need to transition out of his current knee brace. PT even pointed to his ortho visit to [facility] on 1/25/24~ recommendation at that time was for pt to begin removing his brace. However, pt continues to wear his hinged knee brace(even during sleep). PT recommended pt work on sit<> stand trials at home without use of brace and to begin sleeping without brace.    Written Home Exercises Provided: Patient instructed to cont prior HEP.    See EMR under Patient Instructions for exercises provided 2/6/2024; 2/16/24    Assessment     Joshua continues to make slow gains with right lower extremity strength and range of motion leading to improved standing tolerance. Attempted unsupported gait training this session but patient had trouble maintaining balance due to hesitancy to accept weight and allow knee flexion on the right lower extremity. However, glute strength is progressing which is allowing improved standing tolerance without upper extremity support. He remains appropriate for outpatient neuro PT at this " time.      Kenji Is progressing well towards his goals.   Pt prognosis is Good.     Pt will continue to benefit from skilled outpatient physical therapy to address the deficits listed in the problem list box on initial evaluation, provide pt/family education and to maximize pt's level of independence in the home and community environment.     Pt's spiritual, cultural and educational needs considered and pt agreeable to plan of care and goals.     Anticipated barriers to physical therapy: co-morbidities    Goals:  Short Term Goals: 4 weeks   Patient will be independent with home exercise program. MET 3/28/2024  Patient will be able to tolerate standing for at least 5 minutes with LRAD in order to improve function at home. MET 3/28/2024 (with RW)  Patient will be able to ambulate at least 50 feet with LRAD and CGA only in order to improve his independence at home. MET 3/28/2024  Patient will improve R quad strength to at least 4-/5 in order to improve his ability to tolerate standing. MET 3/28/2024  Patient will be able to complete TUG, SSWS, MCTSIB, and 5x STS testing and set appropriate long term goals. Progressing, SSWS completed     Long Term Goals: 8 weeks   Patient will improve FOTO limitation to at least 39% in order to demonstrate improved perception of his abilities. ongoing  Patient will be able to tolerate 5 minutes of standing balance without AD in order to improve function at home. ongoing  Patient will be able to ambulate at least 300' with LRAD or no AD at [facility] in order to return to job at [employer]. ongoing  Patient will be able to improve R knee flexion to at least 110 degrees in order to demonstrate normal ROM in this extremity. Progressing    Plan     Continue to work on ambulation and stair negotiation as well as ROM of the R LE to the patient's tolerance. Focus more on standing balance and glute strength.     Laly Fagan, PT   4/4/2024

## 2024-04-04 ENCOUNTER — CLINICAL SUPPORT (OUTPATIENT)
Dept: REHABILITATION | Facility: HOSPITAL | Age: 19
End: 2024-04-04
Payer: MEDICAID

## 2024-04-04 DIAGNOSIS — Z74.09 IMPAIRED FUNCTIONAL MOBILITY, BALANCE, GAIT, AND ENDURANCE: Primary | ICD-10-CM

## 2024-04-04 DIAGNOSIS — R26.89 IMPAIRED WEIGHT BEARING: ICD-10-CM

## 2024-04-04 DIAGNOSIS — R29.898 DECREASED STRENGTH INVOLVING KNEE JOINT: ICD-10-CM

## 2024-04-04 PROCEDURE — 97110 THERAPEUTIC EXERCISES: CPT | Mod: PO

## 2024-04-09 ENCOUNTER — CLINICAL SUPPORT (OUTPATIENT)
Dept: REHABILITATION | Facility: HOSPITAL | Age: 19
End: 2024-04-09
Payer: MEDICAID

## 2024-04-09 DIAGNOSIS — R26.89 IMPAIRED WEIGHT BEARING: ICD-10-CM

## 2024-04-09 DIAGNOSIS — Z74.09 IMPAIRED FUNCTIONAL MOBILITY, BALANCE, GAIT, AND ENDURANCE: Primary | ICD-10-CM

## 2024-04-09 DIAGNOSIS — R29.898 DECREASED STRENGTH INVOLVING KNEE JOINT: ICD-10-CM

## 2024-04-09 PROCEDURE — 97110 THERAPEUTIC EXERCISES: CPT | Mod: PO

## 2024-04-09 NOTE — PROGRESS NOTES
"[facility]   Physical Therapy Treatment Note     Name: Kenji Malone  Clinic Number: [id number]    Therapy Diagnosis:   Encounter Diagnoses   Name Primary?    Impaired functional mobility, balance, gait, and endurance Yes    Decreased strength involving knee joint     Impaired weight bearing          Physician: Order, Paper    Visit Date: 4/9/2024    Physician who did surgery: Mauricio Jaimes MD     Physician Orders: PT Eval and Treat   Medical Diagnosis from Referral: Postoperative state  Evaluation Date: 2/1/2024  Authorization Period Expiration: 12/31/2024  Plan of Care Expiration: 5/23/2024  Progress Note Due: 4/28/2024  Visit # / Visits authorized: 16/ 20(+eval)  FOTO: 2/3    PTA Visit #: 0/5     Time In: 14:33  Time Out: 15:13    Total Billable Time: 40 minutes    Subjective     Pt reports: he's doing well. Per mom, they mailed off the x-rays to [facility] and hopefully they hear something soon about it.      He was compliant with home exercise program.  Response to previous treatment: felt fine  Functional change: ongoing    Pain: 0/10  Location:  N/A     OBJECTIVE     Objective Measures updated at progress report unless specified.     Pt presents to session without wheelchair for therapy session. Pt wearing hinged knee brace to R LE in "unlocked" position.     Treatment     Kenji received the treatments listed below:      therapeutic exercises to develop strength, endurance, ROM, flexibility, posture, and core stabilization for 40 minutes including:    Pt doffs knee brace for the following exercises:   3 x 10 reps R LE QS with 3 sec hold~ towel placed beneath knee and R shoe removed  3 x 10 reps R LE LAQ with 3 sec hold~ 3# ankle weight added  3 x 10 reps supine R LE AA heel slides~ improved end range knee flexion noted  3 x 10 reps supine R LE SLR~ extensor lag noted  3 x 10 reps supine glute bridges  3 x 10 reps prone R LE AA hip extension   3 x 10 reps of prone R HS curls       2 x " "10 trials of sit<> stand from mat, without R knee brace, CGA + cues for symmetrical LE weight bearing and anterior trunk lean   3 x 30 sec NBOS stance hold, touchdown assist + SBA - with knee brace unlocked  2 x 15 sec R leg fwd lunge hold, touchdown assist + CGA - with knee brace unlocked    Seated KB deadlifts with 10# KB, 3x12 reps        Home Exercises Provided and Patient Education Provided     Education provided:   - home exercise program  -2/16/24: PT provided new exercise for home( SLR). PT also recommended that pt " unlock" his brace at all times( to encourage improved R knee flexion) and encouraged him to ambulate more at home with his RW. Education also provided to pt and mother regarding appropriate brace position.  -3/14/24: PT provided considerable education regarding pt's need to transition out of his current knee brace. PT even pointed to his ortho visit to [facility] on 1/25/24~ recommendation at that time was for pt to begin removing his brace. However, pt continues to wear his hinged knee brace(even during sleep). PT recommended pt work on sit<> stand trials at home without use of brace and to begin sleeping without brace.    Written Home Exercises Provided: Patient instructed to cont prior HEP.    See EMR under Patient Instructions for exercises provided 2/6/2024; 2/16/24    Assessment     Joshua was able to better stand unsupported during today's session, indicating improved standing balance and lower extremity strength/activation as well. While he still lacks some knee flexion range of motion in his R leg, he is progressing and is able to functionally perform tasks with very minimal physical assist. In addition, he is starting to bear weight more through his right side during STS, making them more symmetrical and efficient. Overall, he continues to be appropriate for outpatient neuro PT to address remaining functional deficits and maximize return to prior level of function.     Kenji Is progressing " well towards his goals.   Pt prognosis is Good.     Pt will continue to benefit from skilled outpatient physical therapy to address the deficits listed in the problem list box on initial evaluation, provide pt/family education and to maximize pt's level of independence in the home and community environment.     Pt's spiritual, cultural and educational needs considered and pt agreeable to plan of care and goals.     Anticipated barriers to physical therapy: co-morbidities    Goals:  Short Term Goals: 4 weeks   Patient will be independent with home exercise program. MET 3/28/2024  Patient will be able to tolerate standing for at least 5 minutes with LRAD in order to improve function at home. MET 3/28/2024 (with RW)  Patient will be able to ambulate at least 50 feet with LRAD and CGA only in order to improve his independence at home. MET 3/28/2024  Patient will improve R quad strength to at least 4-/5 in order to improve his ability to tolerate standing. MET 3/28/2024  Patient will be able to complete TUG, SSWS, MCTSIB, and 5x STS testing and set appropriate long term goals. Progressing, SSWS completed     Long Term Goals: 8 weeks   Patient will improve FOTO limitation to at least 39% in order to demonstrate improved perception of his abilities. ongoing  Patient will be able to tolerate 5 minutes of standing balance without AD in order to improve function at home. ongoing  Patient will be able to ambulate at least 300' with LRAD or no AD at [facility] in order to return to job at [employer]. ongoing  Patient will be able to improve R knee flexion to at least 110 degrees in order to demonstrate normal ROM in this extremity. Progressing    Plan     Continue to work on ambulation and stair negotiation as well as ROM of the R LE to the patient's tolerance. Focus more on standing balance and glute strength.     Laly Fagan, PT   4/9/2024

## 2024-04-11 ENCOUNTER — CLINICAL SUPPORT (OUTPATIENT)
Dept: REHABILITATION | Facility: HOSPITAL | Age: 19
End: 2024-04-11
Payer: MEDICAID

## 2024-04-11 DIAGNOSIS — R29.898 DECREASED STRENGTH INVOLVING KNEE JOINT: ICD-10-CM

## 2024-04-11 DIAGNOSIS — R26.89 IMPAIRED WEIGHT BEARING: ICD-10-CM

## 2024-04-11 DIAGNOSIS — Z74.09 IMPAIRED FUNCTIONAL MOBILITY, BALANCE, GAIT, AND ENDURANCE: Primary | ICD-10-CM

## 2024-04-11 PROCEDURE — 97110 THERAPEUTIC EXERCISES: CPT | Mod: PO

## 2024-04-11 NOTE — PROGRESS NOTES
"[facility]   Physical Therapy Treatment Note     Name: Kenji Malone  Clinic Number: [id number]    Therapy Diagnosis:   Encounter Diagnoses   Name Primary?    Impaired functional mobility, balance, gait, and endurance Yes    Decreased strength involving knee joint     Impaired weight bearing        Physician: Order, Paper    Visit Date: 4/11/2024    Physician who did surgery: Mauricio Jaimes MD     Physician Orders: PT Eval and Treat   Medical Diagnosis from Referral: Postoperative state  Evaluation Date: 2/1/2024  Authorization Period Expiration: 12/31/2024  Plan of Care Expiration: 5/23/2024  Progress Note Due: 4/28/2024  Visit # / Visits authorized: 17/ 20(+eval)  FOTO: 2/3    PTA Visit #: 0/5     Time In: 15:30  Time Out: 16:14    Total Billable Time: 44 minutes    Subjective     Pt reports: that KELLEE received the x-rays.      He was compliant with home exercise program.  Response to previous treatment: felt fine  Functional change: ongoing    Pain: 0/10  Location:  N/A     OBJECTIVE     Objective Measures updated at progress report unless specified.     Pt presents to session without wheelchair for therapy session. Pt wearing hinged knee brace to R LE in "unlocked" position.     Treatment     Kenji received the treatments listed below:      therapeutic exercises to develop strength, endurance, ROM, flexibility, posture, and core stabilization for 44 minutes including:    Pt doffs knee brace for the following exercises:   3 x 10 reps R LE QS with 3 sec hold~ towel placed beneath knee and R shoe removed  3 x 10 reps R LE LAQ with 3 sec hold~ 3# ankle weight added  3 x 10 reps supine R LE heel slides~ improved end range knee flexion noted  3 x 10 reps supine R LE SLR~ extensor lag noted  3 x 10 reps supine glute bridges  3 x 10 reps prone R LE hip extension   3 x 10 reps of prone R HS curls     2 x 10 trials of sit<> stand from mat, without R knee brace, CGA + cues for symmetrical LE " "weight bearing and anterior trunk lean   3 x 30 sec NBOS stance hold, touchdown assist + SBA - with knee brace unlocked    Seated KB deadlifts with 10# KB, 3x12 reps    Gait training in // bars: pt donning R knee brace unlocked, attempting to reduce UE support with only 1 UE on // bar + PT SBA-CGA. Patient ambulated fwd then bwd for x3 laps in each direction with R UE supported on bar. Cues to elongate steps when ambulating bwd.     Patient trialed static standing balance with 1 [place]strand crutch in R UE - will assess in more depth at next session        Home Exercises Provided and Patient Education Provided     Education provided:   - home exercise program  -2/16/24: PT provided new exercise for home( SLR). PT also recommended that pt " unlock" his brace at all times( to encourage improved R knee flexion) and encouraged him to ambulate more at home with his RW. Education also provided to pt and mother regarding appropriate brace position.  -3/14/24: PT provided considerable education regarding pt's need to transition out of his current knee brace. PT even pointed to his ortho visit to [facility] on 1/25/24~ recommendation at that time was for pt to begin removing his brace. However, pt continues to wear his hinged knee brace(even during sleep). PT recommended pt work on sit<> stand trials at home without use of brace and to begin sleeping without brace.    Written Home Exercises Provided: Patient instructed to cont prior HEP.    See EMR under Patient Instructions for exercises provided 2/6/2024; 2/16/24    Assessment     Josuha demonstrated better STS today without upper extremity push-off needed and with more symmetrical loading once in standing. In addition, he was able to perform static standing balance with very minimal loss of balance/touchdown assist needed in all 3 trials. He continues to be limited by glute weakness but is appropriately progressing with added glute-targeted exercises. Will attempt trials of " Jose Alfredo crutches next session to reduce restriction of assistive device.     Kenji Is progressing well towards his goals.   Pt prognosis is Good.     Pt will continue to benefit from skilled outpatient physical therapy to address the deficits listed in the problem list box on initial evaluation, provide pt/family education and to maximize pt's level of independence in the home and community environment.     Pt's spiritual, cultural and educational needs considered and pt agreeable to plan of care and goals.     Anticipated barriers to physical therapy: co-morbidities    Goals:  Short Term Goals: 4 weeks   Patient will be independent with home exercise program. MET 3/28/2024  Patient will be able to tolerate standing for at least 5 minutes with LRAD in order to improve function at home. MET 3/28/2024 (with RW)  Patient will be able to ambulate at least 50 feet with LRAD and CGA only in order to improve his independence at home. MET 3/28/2024  Patient will improve R quad strength to at least 4-/5 in order to improve his ability to tolerate standing. MET 3/28/2024  Patient will be able to complete TUG, SSWS, MCTSIB, and 5x STS testing and set appropriate long term goals. Progressing, SSWS completed     Long Term Goals: 8 weeks   Patient will improve FOTO limitation to at least 39% in order to demonstrate improved perception of his abilities. ongoing  Patient will be able to tolerate 5 minutes of standing balance without AD in order to improve function at home. ongoing  Patient will be able to ambulate at least 300' with LRAD or no AD at [facility] in order to return to job at [employer]. ongoing  Patient will be able to improve R knee flexion to at least 110 degrees in order to demonstrate normal ROM in this extremity. Progressing    Plan     Continue to work on ambulation and stair negotiation as well as ROM of the R LE to the patient's tolerance. Focus more on standing balance and glute strength.      Laly Fagan, PT   4/11/2024

## 2024-04-16 ENCOUNTER — CLINICAL SUPPORT (OUTPATIENT)
Dept: REHABILITATION | Facility: HOSPITAL | Age: 19
End: 2024-04-16
Payer: MEDICAID

## 2024-04-16 DIAGNOSIS — R26.89 IMPAIRED WEIGHT BEARING: ICD-10-CM

## 2024-04-16 DIAGNOSIS — R29.898 DECREASED STRENGTH INVOLVING KNEE JOINT: ICD-10-CM

## 2024-04-16 DIAGNOSIS — Z74.09 IMPAIRED FUNCTIONAL MOBILITY, BALANCE, GAIT, AND ENDURANCE: Primary | ICD-10-CM

## 2024-04-16 PROCEDURE — 97110 THERAPEUTIC EXERCISES: CPT | Mod: PO

## 2024-04-16 NOTE — PROGRESS NOTES
"[facility]   Physical Therapy Treatment Note     Name: Kenji Malone  Clinic Number: [id number]    Therapy Diagnosis:   Encounter Diagnoses   Name Primary?    Impaired functional mobility, balance, gait, and endurance Yes    Decreased strength involving knee joint     Impaired weight bearing          Physician: Order, Paper    Visit Date: 4/16/2024    Physician who did surgery: Mauricio Jaimes MD     Physician Orders: PT Eval and Treat   Medical Diagnosis from Referral: Postoperative state  Evaluation Date: 2/1/2024  Authorization Period Expiration: 12/31/2024  Plan of Care Expiration: 5/23/2024  Progress Note Due: 4/28/2024  Visit # / Visits authorized: 18/ 20(+eval)  FOTO: 2/3    PTA Visit #: 0/5     Time In: 16:55  Time Out: 17:38    Total Billable Time: 43 minutes    Subjective     Pt reports: that they spoke to the doctor from [facility] and he would like Joshua to continue any range of motion exercises and keep the brace on while weight bearing on his right leg.      He was compliant with home exercise program.  Response to previous treatment: felt fine  Functional change: ongoing    Pain: 0/10  Location:  N/A     OBJECTIVE     Objective Measures updated at progress report unless specified.     Pt presents to session without wheelchair for therapy session. Pt wearing hinged knee brace to R LE in "unlocked" position.     Treatment     Kenji received the treatments listed below:      therapeutic exercises to develop strength, endurance, ROM, flexibility, posture, and core stabilization for 43 minutes including:    Pt doffs knee brace for the following exercises:   3 x 10 reps R LE QS with 3 sec hold~ towel placed beneath knee and R shoe removed  3 x 10 reps R LE LAQ with 3 sec hold~ 3# ankle weight added  3 x 10 reps supine R LE heel slides  3 x 10 reps supine R LE SLR~ extensor lag noted  3 x 10 reps supine glute bridges  3 x 10 reps prone R LE hip extension   3 x 10 reps of prone R HS " "curls     Seated KB deadlifts with 10# KB, 2x12 reps    Gait training with R SBQC: patient practiced sequencing of gait using SBQC in R hand. PT providing CGA and VC for step-by-step sequencing. Patient utilizing 3-point gait pattern (cane, L foot, R foot) during gait training. Patient ambulated for short distances of 5 ft for 6 trials to get used to sequencing.      2 x 10 trials of sit <> stand from mat without arm push-off, with R knee brace unlocked, SBA-CGA   3 x 30 sec NBOS stance hold, touchdown assist + SBA, with knee brace unlocked    Home Exercises Provided and Patient Education Provided     Education provided:   - home exercise program  -2/16/24: PT provided new exercise for home( SLR). PT also recommended that pt " unlock" his brace at all times( to encourage improved R knee flexion) and encouraged him to ambulate more at home with his RW. Education also provided to pt and mother regarding appropriate brace position.  -3/14/24: PT provided considerable education regarding pt's need to transition out of his current knee brace. PT even pointed to his ortho visit to [facility] on 1/25/24~ recommendation at that time was for pt to begin removing his brace. However, pt continues to wear his hinged knee brace(even during sleep). PT recommended pt work on sit<> stand trials at home without use of brace and to begin sleeping without brace.    Written Home Exercises Provided: Patient instructed to cont prior HEP.    See EMR under Patient Instructions for exercises provided 2/6/2024; 2/16/24    Assessment     Joshua is progressing as appropriate towards range of motion and strength goals. Introduced SBQC today for gait training to decrease restriction of assistive device and challenge standing dynamic balance as well. He improved gait pattern when cued to state steps out loud for sequencing though still limited by instability. His glute strength is improved both by increased ease of seated deadlifts as well as improved " standing static balance without upper extremity support. Overall, he is doing well post-op and continues to be appropriate for outpatient PT to maximize return to prior level of function.      Kenji Is progressing well towards his goals.   Pt prognosis is Good.     Pt will continue to benefit from skilled outpatient physical therapy to address the deficits listed in the problem list box on initial evaluation, provide pt/family education and to maximize pt's level of independence in the home and community environment.     Pt's spiritual, cultural and educational needs considered and pt agreeable to plan of care and goals.     Anticipated barriers to physical therapy: co-morbidities    Goals:  Short Term Goals: 4 weeks   Patient will be independent with home exercise program. MET 3/28/2024  Patient will be able to tolerate standing for at least 5 minutes with LRAD in order to improve function at home. MET 3/28/2024 (with RW)  Patient will be able to ambulate at least 50 feet with LRAD and CGA only in order to improve his independence at home. MET 3/28/2024  Patient will improve R quad strength to at least 4-/5 in order to improve his ability to tolerate standing. MET 3/28/2024  Patient will be able to complete TUG, SSWS, MCTSIB, and 5x STS testing and set appropriate long term goals. Progressing, SSWS completed     Long Term Goals: 8 weeks   Patient will improve FOTO limitation to at least 39% in order to demonstrate improved perception of his abilities. ongoing  Patient will be able to tolerate 5 minutes of standing balance without AD in order to improve function at home. ongoing  Patient will be able to ambulate at least 300' with LRAD or no AD at [facility] in order to return to job at [email or web address]. ongoing  Patient will be able to improve R knee flexion to at least 110 degrees in order to demonstrate normal ROM in this extremity. Progressing    Plan     Continue to work on ambulation and stair negotiation as  well as ROM of the R LE to the patient's tolerance. Focus more on standing balance and glute strength. Re-assessment next week.     Laly Fagan, PT   4/16/2024

## 2024-04-18 ENCOUNTER — CLINICAL SUPPORT (OUTPATIENT)
Dept: REHABILITATION | Facility: HOSPITAL | Age: 19
End: 2024-04-18
Payer: MEDICAID

## 2024-04-18 DIAGNOSIS — Z74.09 IMPAIRED FUNCTIONAL MOBILITY, BALANCE, GAIT, AND ENDURANCE: Primary | ICD-10-CM

## 2024-04-18 DIAGNOSIS — R26.89 IMPAIRED WEIGHT BEARING: ICD-10-CM

## 2024-04-18 DIAGNOSIS — R29.898 DECREASED STRENGTH INVOLVING KNEE JOINT: ICD-10-CM

## 2024-04-18 PROCEDURE — 97110 THERAPEUTIC EXERCISES: CPT | Mod: PO

## 2024-04-18 NOTE — PROGRESS NOTES
"[facility]   Physical Therapy Treatment Note     Name: Kenji Malone  Clinic Number: [id number]    Therapy Diagnosis:   Encounter Diagnoses   Name Primary?    Impaired functional mobility, balance, gait, and endurance Yes    Decreased strength involving knee joint     Impaired weight bearing          Physician: Order, Paper    Visit Date: 4/18/2024    Physician who did surgery: Mauricio Jaimes MD     Physician Orders: PT Eval and Treat   Medical Diagnosis from Referral: Postoperative state  Evaluation Date: 2/1/2024  Authorization Period Expiration: 12/31/2024  Plan of Care Expiration: 5/23/2024  Progress Note Due: 4/28/2024  Visit # / Visits authorized: 19/ 20(+eval)  FOTO: 2/3    PTA Visit #: 0/5     Time In: 15:38  Time Out: 16:16    Total Billable Time: 38 minutes    Subjective     Pt reports: that he's sorry he was late, he was waiting on his brother.     He was compliant with home exercise program.  Response to previous treatment: felt fine  Functional change: ongoing    Pain: 0/10  Location:  N/A     OBJECTIVE     Objective Measures updated at progress report unless specified.     Pt presents to session without wheelchair for therapy session. Pt wearing hinged knee brace to R LE in "unlocked" position.     Treatment     Kenji received the treatments listed below:      therapeutic exercises to develop strength, endurance, ROM, flexibility, posture, and core stabilization for 38 minutes including:    Pt doffs knee brace for the following exercises:   3 x 10 reps R LE QS with 3 sec hold~ towel placed beneath knee and R shoe removed  3 x 10 reps R LE LAQ with 3 sec hold~ 3# ankle weight added  3 x 10 reps supine R LE heel slides  3 x 10 reps supine R LE SLR~ extensor lag noted  3 x 10 reps supine glute bridges  3 x 10 reps prone R LE hip extension   3 x 10 reps of prone R HS curls     Seated KB deadlifts with 10# KB, 3x12 reps    Gait training with R SBQC: deferred this session    2 x " "10 trials of sit <> stand from mat without arm push-off, with R knee brace unlocked, SBA-CGA   3 x 30 sec NBOS stance hold, touchdown assist + SBA, with knee brace unlocked    2 x 10 step ups onto 4" step with R leg leading, light upper extremity assist on RW  Asc/desc 4 standard height steps with R leg leading and 1 HR support     Home Exercises Provided and Patient Education Provided     Education provided:   - home exercise program  -2/16/24: PT provided new exercise for home( SLR). PT also recommended that pt " unlock" his brace at all times( to encourage improved R knee flexion) and encouraged him to ambulate more at home with his RW. Education also provided to pt and mother regarding appropriate brace position.  -3/14/24: PT provided considerable education regarding pt's need to transition out of his current knee brace. PT even pointed to his ortho visit to [facility] on 1/25/24~ recommendation at that time was for pt to begin removing his brace. However, pt continues to wear his hinged knee brace(even during sleep). PT recommended pt work on sit<> stand trials at home without use of brace and to begin sleeping without brace.    Written Home Exercises Provided: Patient instructed to cont prior HEP.    See EMR under Patient Instructions for exercises provided 2/6/2024; 2/16/24    Assessment     Joshua continues to progress well with range of motion and strength training. Consider increasing weight for LAQ as it is becoming fairly easy to complete and he is able to maintain quad engagement even during the isometric hold. He is also now able to asc/desc steps using fwd approach instead of lateral approach which indicates improved range of motion as well as quad/glute strength. He still has deficits in dynamic standing balance but overall is progressing.     Kenji Is progressing well towards his goals.   Pt prognosis is Good.     Pt will continue to benefit from skilled outpatient physical therapy to address the deficits " listed in the problem list box on initial evaluation, provide pt/family education and to maximize pt's level of independence in the home and community environment.     Pt's spiritual, cultural and educational needs considered and pt agreeable to plan of care and goals.     Anticipated barriers to physical therapy: co-morbidities    Goals:  Short Term Goals: 4 weeks   Patient will be independent with home exercise program. MET 3/28/2024  Patient will be able to tolerate standing for at least 5 minutes with LRAD in order to improve function at home. MET 3/28/2024 (with RW)  Patient will be able to ambulate at least 50 feet with LRAD and CGA only in order to improve his independence at home. MET 3/28/2024  Patient will improve R quad strength to at least 4-/5 in order to improve his ability to tolerate standing. MET 3/28/2024  Patient will be able to complete TUG, SSWS, MCTSIB, and 5x STS testing and set appropriate long term goals. Progressing, SSWS completed     Long Term Goals: 8 weeks   Patient will improve FOTO limitation to at least 39% in order to demonstrate improved perception of his abilities. ongoing  Patient will be able to tolerate 5 minutes of standing balance without AD in order to improve function at home. ongoing  Patient will be able to ambulate at least 300' with LRAD or no AD at [facility] in order to return to job at [employer]. ongoing  Patient will be able to improve R knee flexion to at least 110 degrees in order to demonstrate normal ROM in this extremity. Progressing    Plan     Continue to work on ambulation and stair negotiation as well as ROM of the R LE to the patient's tolerance. Focus more on standing balance and glute strength. Re-assessment next week.     Laly Fagan, PT   4/18/2024

## 2024-04-25 ENCOUNTER — CLINICAL SUPPORT (OUTPATIENT)
Dept: REHABILITATION | Facility: HOSPITAL | Age: 19
End: 2024-04-25
Payer: MEDICAID

## 2024-04-25 DIAGNOSIS — Z74.09 IMPAIRED FUNCTIONAL MOBILITY, BALANCE, GAIT, AND ENDURANCE: Primary | ICD-10-CM

## 2024-04-25 DIAGNOSIS — R26.89 IMPAIRED WEIGHT BEARING: ICD-10-CM

## 2024-04-25 DIAGNOSIS — R29.898 DECREASED STRENGTH INVOLVING KNEE JOINT: ICD-10-CM

## 2024-04-25 PROCEDURE — 97110 THERAPEUTIC EXERCISES: CPT | Mod: PO

## 2024-04-25 NOTE — PROGRESS NOTES
"[facility]   Physical Therapy Treatment Note     Name: Kenji Malone  Clinic Number: [id number]    Therapy Diagnosis:   Encounter Diagnoses   Name Primary?    Impaired functional mobility, balance, gait, and endurance Yes    Decreased strength involving knee joint     Impaired weight bearing        Physician: Order, Paper    Visit Date: 4/25/2024    Physician who did surgery: Mauricio Jaimes MD     Physician Orders: PT Eval and Treat   Medical Diagnosis from Referral: Postoperative state  Evaluation Date: 2/1/2024  Authorization Period Expiration: 12/31/2024  Plan of Care Expiration: 5/23/2024  Progress Note Due: 5/23/2024  Visit # / Visits authorized: 20/ 40(+eval)  FOTO: 2/3    PTA Visit #: 0/5     Time In: 15:29  Time Out: 16:13  Total Billable Time: 44 minutes    Subjective     Pt reports: he has been doing well with no new complaints. He's going to [place] this weekend for his birthday.      He was compliant with home exercise program.  Response to previous treatment: felt fine  Functional change: ongoing    Pain: 0/10  Location:  N/A     OBJECTIVE     Objective Measures updated at progress report unless specified.     Pt presents to session without wheelchair for therapy session. Pt wearing hinged knee brace to R LE in "unlocked" position.     Measurements taken with brace off.   RLE ROM:  Knee flexion = 100 deg before reported pain  Knee extension = resting in 1 deg flexion but able to actively achieve 0 deg     LLE ROM:  Knee flexion = 130 deg  Knee extension = lacking 10 deg even with overpressure     Lower Extremity Strength~ tested in sitting  Right LE 10/24/23 previous POC Eval 2/6/2024    2/29/24 3/28/24 4/24/24 Left LE 10/24/23  Previous POC Eval 2/6/2024    2/9/24 3/28/24 4/24/24   Hip flexion:  5/5 5/5 5/5 5/5 5/5 Hip flexion: 5/5 5/5 5/5 5/5 5/5   Knee extension: 5/5 3/5 3+/5 4/5 4+/5 Knee extension: 5/5 5/5 5/5 5/5 5/5   Knee flexion: 4+/5 4-/5* 4/5* 4/5 4/5 Knee flexion: " 3/5 3/5 3+/5 4-/5 4-/5   Ankle dorsiflexion:  1+/5 1/5 2/5 2+/5 2/5 Ankle dorsiflexion: trace trace/5 1/5 1/5 1/5   Ankle plantarflexion:  1/5 1/5 1/5 1/5 1/5 Ankle plantarflexion: trace trace/5 1/5 1/5 1/5   Hip abduction: 3+/5 to 4-/5* 3+/5 3+/5 3+/5 3+/5 Hip abduction: 3+/5* 3+/5 3+/5 4-/5 4/5   Hip adduction: 5/5 5/5 5/5 5/5 5/5 Hip adduction 5/5 5/5 5/5 5/5 5/5   Hip extension: 4/5 3+/5 4/5 4/5 4/5 Hip extension: 5/5 5/5 4-/5 4/5 4+/5   *within range of motion     Standing tolerance: 1 min 42 sec unsupported standing (no AD)  5x STS: 13 seconds with arms, 21 seconds without arms     Gait assessments done with hinged knee brace ON     Gait Assessment:   - AD used: RW  - Assistance: supervision  - Distance: limited community (300-400 ft)   - Stairs: asc/desc using 1 HR and lateral stepping foot to foot technique but able to negotiation fwd facing 1 step at a time      Self-Selected Gait Speed:   Trial 1: 6 meters in 11 seconds, 0.55 m/s   Trial 2: 6 meters in 12 seconds, 0.5 m/s   Avg = 0.53 m/s       2-Minute Walk Test  AD used: rolling walker  Distance: 263 ft     Intake Outcome Measure for FOTO Intake Survey     Therapist reviewed FOTO scores for Kenji Malone on 4/25/2024.   FOTO documents entered into [employer] - see Media section.     Intake Score: 21%  Current Score: 53%        Treatment     Kenji received the treatments listed below:      therapeutic exercises to develop strength, endurance, ROM, flexibility, posture, and core stabilization for 44 minutes including:    Pt doffs knee brace for the following exercises:   3 x 10 reps R LE QS with 3 sec hold~ towel placed beneath knee and R shoe removed  3 x 10 reps R LE LAQ with 3 sec hold~ 5# ankle weight added  3 x 10 reps supine R LE heel slides  3 x 10 reps supine R LE SLR~ extensor lag noted  3 x 10 reps supine glute bridges  3 x 10 reps prone R LE hip extension   3 x 10 reps of prone R HS curls       Not performed this session:  Seated  [facility] with  "10# KB, 3x12 reps    2 x 10 trials of sit <> stand from mat without arm push-off, with R knee brace unlocked, SBA-CGA   3 x 30 sec NBOS stance hold, touchdown assist + SBA, with knee brace unlocked    2 x 10 step ups onto 4" step with R leg leading, light upper extremity assist on RW  Asc/desc 4 standard height steps with R leg leading and 1 HR support     Home Exercises Provided and Patient Education Provided     Education provided:   - home exercise program  -2/16/24: PT provided new exercise for home( SLR). PT also recommended that pt " unlock" his brace at all times( to encourage improved R knee flexion) and encouraged him to ambulate more at home with his RW. Education also provided to pt and mother regarding appropriate brace position.  -3/14/24: PT provided considerable education regarding pt's need to transition out of his current knee brace. PT even pointed to his ortho visit to [facility] on 1/25/24~ recommendation at that time was for pt to begin removing his brace. However, pt continues to wear his hinged knee brace(even during sleep). PT recommended pt work on sit<> stand trials at home without use of brace and to begin sleeping without brace.    Written Home Exercises Provided: Patient instructed to cont prior HEP.    See EMR under Patient Instructions for exercises provided 2/6/2024; 2/16/24    Assessment     Joshua demonstrates improvements in lower extremity strength and standing balance on today's reassessment, leading to increases in standing tolerance as well as gait endurance as noted by his 2MWT. While he did not have any significant range of motion gains, he has maintained at least >90 deg of knee flexion on his right leg which has allowed for asc/desc steps from a front-facing position vs. lateral stepping technique. He still is limited by decreased glute strength and pain with end range knee flexion but overall progressing appropriately towards goals.     Kenji Is progressing well towards his " goals.   Pt prognosis is Good.     Pt will continue to benefit from skilled outpatient physical therapy to address the deficits listed in the problem list box on initial evaluation, provide pt/family education and to maximize pt's level of independence in the home and community environment.     Pt's spiritual, cultural and educational needs considered and pt agreeable to plan of care and goals.     Anticipated barriers to physical therapy: co-morbidities    Goals:  Short Term Goals: 4 weeks   Patient will be independent with home exercise program. MET 3/28/2024  Patient will be able to tolerate standing for at least 5 minutes with LRAD in order to improve function at home. MET 3/28/2024 (with RW)  Patient will be able to ambulate at least 50 feet with LRAD and CGA only in order to improve his independence at home. MET 3/28/2024  Patient will improve R quad strength to at least 4-/5 in order to improve his ability to tolerate standing. MET 3/28/2024  Patient will be able to complete TUG, , MCTSIB, and  testing and set appropriate long term goals. Progressing, SSWS completed     Long Term Goals: 8 weeks   Patient will improve FOTO limitation to at least 39% in order to demonstrate improved perception of his abilities. ongoing  Patient will be able to tolerate 5 minutes of standing balance without AD in order to improve function at home. Progressing, ~2 min  Patient will be able to ambulate at least 300' with LRAD or no AD at [facility] in order to return to job at [employer]. MET 4/25/24  Patient will be able to improve R knee flexion to at least 110 degrees in order to demonstrate normal ROM in this extremity. Progressing  Pt will improve 5x chair rise score to </= 18s without UE assist for improved muscular endurance. Added 4/25/24  Pt to perform 2 minute walk test for 350 feet or greater to improve gait speed & endurance. Added 4/25/24      Plan     Continue to work on ambulation and stair negotiation as  well as ROM of the R LE to the patient's tolerance. Focus more on standing balance and glute strength.     Laly Fagan, PT   4/25/2024

## 2024-05-02 ENCOUNTER — CLINICAL SUPPORT (OUTPATIENT)
Dept: REHABILITATION | Facility: HOSPITAL | Age: 19
End: 2024-05-02
Payer: MEDICAID

## 2024-05-02 DIAGNOSIS — R29.898 DECREASED STRENGTH INVOLVING KNEE JOINT: ICD-10-CM

## 2024-05-02 DIAGNOSIS — Z74.09 IMPAIRED FUNCTIONAL MOBILITY, BALANCE, GAIT, AND ENDURANCE: Primary | ICD-10-CM

## 2024-05-02 DIAGNOSIS — R26.89 IMPAIRED WEIGHT BEARING: ICD-10-CM

## 2024-05-02 PROCEDURE — 97110 THERAPEUTIC EXERCISES: CPT | Mod: PO

## 2024-05-02 NOTE — PROGRESS NOTES
"[facility]   Physical Therapy Treatment Note     Name: Kenji Malone  Clinic Number: [id number]    Therapy Diagnosis:   Encounter Diagnoses   Name Primary?    Impaired functional mobility, balance, gait, and endurance Yes    Decreased strength involving knee joint     Impaired weight bearing        Physician: Order, Paper    Visit Date: 5/2/2024    Physician who did surgery: Mauricio Jaimes MD     Physician Orders: PT Eval and Treat   Medical Diagnosis from Referral: Postoperative state  Evaluation Date: 2/1/2024  Authorization Period Expiration: 12/31/2024  Plan of Care Expiration: 5/23/2024  Progress Note Due: 5/23/2024  Visit # / Visits authorized: 21/ 40(+eval)  FOTO: 2/3    PTA Visit #: 0/5     Time In: 15:30  Time Out: 16:14  Total Billable Time: 43 minutes    Subjective     Pt reports: he had a lot of fun in [place] for his birthday. He didn't do his home exercise program while on vacation, though.       He was compliant with home exercise program.  Response to previous treatment: felt fine  Functional change: ongoing    Pain: 0/10  Location:  N/A     OBJECTIVE     Objective Measures updated at progress report unless specified.     Pt presents to session without wheelchair for therapy session. Pt wearing hinged knee brace to R LE in "unlocked" position.     Treatment     Kenji received the treatments listed below:      therapeutic exercises to develop strength, endurance, ROM, flexibility, posture, and core stabilization for 43 minutes including:    Pt doffs knee brace for the following exercises:   3 x 10 reps R LE QS with 3 sec hold~ towel placed beneath knee and R shoe removed  3 x 10 reps supine R LE heel slides  3 x 10 reps supine R LE SLR~ extensor lag noted  3 x 10 reps prone R LE hip extension   3 x 10 reps of prone R HS curls ~ 2# ankle weight added  3 x 10 reps supine glute bridges  3 x 10 reps R LE LAQ with 3 sec hold~ 5# ankle weight added    Seated KB deadlifts with " "10# KB, 3x12 reps    2 x 10 trials of sit <> stand from mat without arm push-off, with R knee brace unlocked, SBA-CGA   3 x 30 sec NBOS stance hold, touchdown assist + SBA, with knee brace unlocked    At ballet bar:  Gait with 1 UE support for x4 laps and PT SBA  Gait with no UE support for x2 laps and PT CGA-min A for steadying     Home Exercises Provided and Patient Education Provided     Education provided:   - home exercise program  -2/16/24: PT provided new exercise for home( SLR). PT also recommended that pt " unlock" his brace at all times( to encourage improved R knee flexion) and encouraged him to ambulate more at home with his RW. Education also provided to pt and mother regarding appropriate brace position.  -3/14/24: PT provided considerable education regarding pt's need to transition out of his current knee brace. PT even pointed to his ortho visit to [facility] on 1/25/24~ recommendation at that time was for pt to begin removing his brace. However, pt continues to wear his hinged knee brace(even during sleep). PT recommended pt work on sit<> stand trials at home without use of brace and to begin sleeping without brace.    Written Home Exercises Provided: Patient instructed to cont prior HEP.    See EMR under Patient Instructions for exercises provided 2/6/2024; 2/16/24    Assessment     Joshua continues to tolerate range of motion/strengthening therex even with addition of resistance via weighted cuffs. He is still limited with knee flexion range of motion but slowly progressing. While he is improving with static standing balance, he is still very unsteady with unsupported gait at this time. To improve dynamic balance, patient will likely need to progress to a less restrictive assistive device. He remains appropriate for outpatient neuro PT at this time to further progress return to prior level of function.       Kenji Is progressing well towards his goals.   Pt prognosis is Good.     Pt will continue to " benefit from skilled outpatient physical therapy to address the deficits listed in the problem list box on initial evaluation, provide pt/family education and to maximize pt's level of independence in the home and community environment.     Pt's spiritual, cultural and educational needs considered and pt agreeable to plan of care and goals.     Anticipated barriers to physical therapy: co-morbidities    Goals:  Short Term Goals: 4 weeks   Patient will be independent with home exercise program. MET 3/28/2024  Patient will be able to tolerate standing for at least 5 minutes with LRAD in order to improve function at home. MET 3/28/2024 (with RW)  Patient will be able to ambulate at least 50 feet with LRAD and CGA only in order to improve his independence at home. MET 3/28/2024  Patient will improve R quad strength to at least 4-/5 in order to improve his ability to tolerate standing. MET 3/28/2024  Patient will be able to complete TUG, , MCTSIB, and  testing and set appropriate long term goals. Progressing, SSWS completed     Long Term Goals: 8 weeks   Patient will improve FOTO limitation to at least 39% in order to demonstrate improved perception of his abilities. ongoing  Patient will be able to tolerate 5 minutes of standing balance without AD in order to improve function at home. Progressing, ~2 min  Patient will be able to ambulate at least 300' with LRAD or no AD at [facility] in order to return to job at [employer]. MET 4/25/24  Patient will be able to improve R knee flexion to at least 110 degrees in order to demonstrate normal ROM in this extremity. Progressing  Pt will improve 5x chair rise score to </= 18s without UE assist for improved muscular endurance. Added 4/25/24  Pt to perform 2 minute walk test for 350 feet or greater to improve gait speed & endurance. Added 4/25/24      Plan     Continue to work on ambulation and stair negotiation as well as ROM of the R LE to the patient's tolerance. Focus  more on standing balance and glute strength.     Laly Fagan, PT   5/2/2024

## 2024-05-07 ENCOUNTER — CLINICAL SUPPORT (OUTPATIENT)
Dept: REHABILITATION | Facility: HOSPITAL | Age: 19
End: 2024-05-07
Payer: MEDICAID

## 2024-05-07 DIAGNOSIS — R26.89 IMPAIRED WEIGHT BEARING: ICD-10-CM

## 2024-05-07 DIAGNOSIS — R29.898 DECREASED STRENGTH INVOLVING KNEE JOINT: ICD-10-CM

## 2024-05-07 DIAGNOSIS — Z74.09 IMPAIRED FUNCTIONAL MOBILITY, BALANCE, GAIT, AND ENDURANCE: Primary | ICD-10-CM

## 2024-05-07 PROCEDURE — 97110 THERAPEUTIC EXERCISES: CPT | Mod: PO

## 2024-05-07 NOTE — PROGRESS NOTES
"[facility]   Physical Therapy Treatment Note     Name: Kenji Malone  Clinic Number: [id number]    Therapy Diagnosis:   No diagnosis found.      Physician: Order, Paper    Visit Date: 5/7/2024    Physician who did surgery: Mauricio Jaimes MD     Physician Orders: PT Eval and Treat   Medical Diagnosis from Referral: Postoperative state  Evaluation Date: 2/1/2024  Authorization Period Expiration: 12/31/2024  Plan of Care Expiration: 5/23/2024  Progress Note Due: 5/23/2024  Visit # / Visits authorized: 22/ 40(+eval)  FOTO: 2/3    PTA Visit #: 0/5     Time In: 13:***  Time Out: 14:***  Total Billable Time: *** minutes    Subjective     Pt reports: he had a lot of fun in [place] for his birthday. He didn't do his home exercise program while on vacation, though.       He was compliant with home exercise program.  Response to previous treatment: felt fine  Functional change: ongoing    Pain: 0/10  Location:  N/A     OBJECTIVE     Objective Measures updated at progress report unless specified.     Pt presents to session without wheelchair for therapy session. Pt wearing hinged knee brace to R LE in "unlocked" position.     Treatment     Kenji received the treatments listed below:      therapeutic exercises to develop strength, endurance, ROM, flexibility, posture, and core stabilization for *** minutes including:    Pt doffs knee brace for the following exercises:   3 x 10 reps R LE QS with 3 sec hold~ towel placed beneath knee and R shoe removed  3 x 10 reps supine R LE heel slides  3 x 10 reps supine R LE SLR~ extensor lag noted  3 x 10 reps prone R LE hip extension   3 x 10 reps of prone R HS curls ~ 2# ankle weight added  3 x 10 reps supine glute bridges  3 x 10 reps R LE LAQ with 3 sec hold~ 5# ankle weight added    Seated KB deadlifts with 10# KB, 3x12 reps    2 x 10 trials of sit <> stand from mat without arm push-off, with R knee brace unlocked, SBA-CGA   3 x 30 sec NBOS stance hold, " "touchdown assist + SBA, with knee brace unlocked    At ballet bar:  Gait with 1 UE support for x4 laps and PT SBA  Gait with no UE support for x2 laps and PT CGA-min A for steadying     Home Exercises Provided and Patient Education Provided     Education provided:   - home exercise program  -2/16/24: PT provided new exercise for home( SLR). PT also recommended that pt " unlock" his brace at all times( to encourage improved R knee flexion) and encouraged him to ambulate more at home with his RW. Education also provided to pt and mother regarding appropriate brace position.  -3/14/24: PT provided considerable education regarding pt's need to transition out of his current knee brace. PT even pointed to his ortho visit to [facility] on 1/25/24~ recommendation at that time was for pt to begin removing his brace. However, pt continues to wear his hinged knee brace(even during sleep). PT recommended pt work on sit<> stand trials at home without use of brace and to begin sleeping without brace.    Written Home Exercises Provided: Patient instructed to cont prior HEP.    See EMR under Patient Instructions for exercises provided 2/6/2024; 2/16/24    Assessment     Joshua ***      Kneji Is progressing well towards his goals.   Pt prognosis is Good.     Pt will continue to benefit from skilled outpatient physical therapy to address the deficits listed in the problem list box on initial evaluation, provide pt/family education and to maximize pt's level of independence in the home and community environment.     Pt's spiritual, cultural and educational needs considered and pt agreeable to plan of care and goals.     Anticipated barriers to physical therapy: co-morbidities    Goals:  Short Term Goals: 4 weeks   Patient will be independent with home exercise program. MET 3/28/2024  Patient will be able to tolerate standing for at least 5 minutes with LRAD in order to improve function at home. MET 3/28/2024 (with RW)  Patient will be able " to ambulate at least 50 feet with LRAD and CGA only in order to improve his independence at home. MET 3/28/2024  Patient will improve R quad strength to at least 4-/5 in order to improve his ability to tolerate standing. MET 3/28/2024  Patient will be able to complete TUG, , MCTSIB, and  testing and set appropriate long term goals. Progressing, SSWS completed     Long Term Goals: 8 weeks   Patient will improve FOTO limitation to at least 39% in order to demonstrate improved perception of his abilities. ongoing  Patient will be able to tolerate 5 minutes of standing balance without AD in order to improve function at home. Progressing, ~2 min  Patient will be able to ambulate at least 300' with LRAD or no AD at [facility] in order to return to job at [employer]. MET 4/25/24  Patient will be able to improve R knee flexion to at least 110 degrees in order to demonstrate normal ROM in this extremity. Progressing  Pt will improve 5x chair rise score to </= 18s without UE assist for improved muscular endurance. Added 4/25/24  Pt to perform 2 minute walk test for 350 feet or greater to improve gait speed & endurance. Added 4/25/24      Plan     Continue to work on ambulation and stair negotiation as well as ROM of the R LE to the patient's tolerance. Focus more on standing balance and glute strength.     Laly Fagan, PT   5/7/2024

## 2024-05-07 NOTE — PROGRESS NOTES
"[facility]   Physical Therapy Treatment Note     Name: Kenji Malone  Clinic Number: [id number]    Therapy Diagnosis:   Encounter Diagnoses   Name Primary?    Impaired functional mobility, balance, gait, and endurance Yes    Decreased strength involving knee joint     Impaired weight bearing        Physician: Order, Paper    Visit Date: 5/7/2024    Physician who did surgery: Mauricio Jaimes MD     Physician Orders: PT Eval and Treat   Medical Diagnosis from Referral: Postoperative state  Evaluation Date: 2/1/2024  Authorization Period Expiration: 12/31/2024  Plan of Care Expiration: 5/23/2024  Progress Note Due: 5/23/2024  Visit # / Visits authorized: 22/ 40(+eval)  FOTO: 2/3    PTA Visit #: 0/5     Time In: 13:47  Time Out: 14:29  Total Billable Time: 42 minutes    Subjective     Pt reports: he is doing well with no new complaints.       He was compliant with home exercise program.  Response to previous treatment: felt fine  Functional change: ongoing    Pain: 0/10  Location:  N/A     OBJECTIVE     Objective Measures updated at progress report unless specified.     Pt presents to session without wheelchair for therapy session. Pt wearing hinged knee brace to R LE in "unlocked" position.     Treatment     Kenji received the treatments listed below:      therapeutic exercises to develop strength, endurance, ROM, flexibility, posture, and core stabilization for 42 minutes including:    Pt doffs knee brace for the following exercises:   3 x 10 reps R LE QS with 3 sec hold~ towel placed beneath knee and R shoe removed  3 x 10 reps supine R LE heel slides  3 x 10 reps supine R LE SLR~ extensor lag noted  3 x 10 reps prone R LE hip extension   3 x 10 reps of prone R HS curls ~ 2# ankle weight added  3 x 10 reps supine glute bridges  3 x 10 reps R LE LAQ with 3 sec hold~ 5# ankle weight added    Seated KB deadlifts with 10# KB, 3x12 reps    2 x 10 trials of sit <> stand from mat without arm " "push-off, with R knee brace unlocked, SBA-CGA   3 x 30 sec NBOS stance hold, touchdown assist + SBA, with knee brace unlocked    At ballet bar:  Gait with 1 UE support for x4 laps and PT SBA  Gait with no UE support for x4 laps and PT CGA-min A for steadying     Home Exercises Provided and Patient Education Provided     Education provided:   - home exercise program  -2/16/24: PT provided new exercise for home( SLR). PT also recommended that pt " unlock" his brace at all times( to encourage improved R knee flexion) and encouraged him to ambulate more at home with his RW. Education also provided to pt and mother regarding appropriate brace position.  -3/14/24: PT provided considerable education regarding pt's need to transition out of his current knee brace. PT even pointed to his ortho visit to [facility] on 1/25/24~ recommendation at that time was for pt to begin removing his brace. However, pt continues to wear his hinged knee brace(even during sleep). PT recommended pt work on sit<> stand trials at home without use of brace and to begin sleeping without brace.    Written Home Exercises Provided: Patient instructed to cont prior HEP.    See EMR under Patient Instructions for exercises provided 2/6/2024; 2/16/24    Assessment     Joshua is making great progress with overall strength, demonstrating an easier time with all exercises and requiring less rest between sets. He also is able to better rise to stand with improved coordination, though still needs upper extremity support once standing due to easily losing balance. He was able to ambulate more efficiently without upper extremity support as well this session, indicating progression towards his prior level of function.     Kenji Is progressing well towards his goals.   Pt prognosis is Good.     Pt will continue to benefit from skilled outpatient physical therapy to address the deficits listed in the problem list box on initial evaluation, provide pt/family education and " to maximize pt's level of independence in the home and community environment.     Pt's spiritual, cultural and educational needs considered and pt agreeable to plan of care and goals.     Anticipated barriers to physical therapy: co-morbidities    Goals:  Short Term Goals: 4 weeks   Patient will be independent with home exercise program. MET 3/28/2024  Patient will be able to tolerate standing for at least 5 minutes with LRAD in order to improve function at home. MET 3/28/2024 (with RW)  Patient will be able to ambulate at least 50 feet with LRAD and CGA only in order to improve his independence at home. MET 3/28/2024  Patient will improve R quad strength to at least 4-/5 in order to improve his ability to tolerate standing. MET 3/28/2024  Patient will be able to complete TUG, , MCTSIB, and  testing and set appropriate long term goals. Progressing, SSWS completed     Long Term Goals: 8 weeks   Patient will improve FOTO limitation to at least 39% in order to demonstrate improved perception of his abilities. ongoing  Patient will be able to tolerate 5 minutes of standing balance without AD in order to improve function at home. Progressing, ~2 min  Patient will be able to ambulate at least 300' with LRAD or no AD at [facility] in order to return to job at [employer]. MET 4/25/24  Patient will be able to improve R knee flexion to at least 110 degrees in order to demonstrate normal ROM in this extremity. Progressing  Pt will improve 5x chair rise score to </= 18s without UE assist for improved muscular endurance. Ongoing  Pt to perform 2 minute walk test for 350 feet or greater to improve gait speed & endurance. Ongoing      Plan     Continue to work on ambulation and stair negotiation as well as ROM of the R LE to the patient's tolerance. Focus more on standing balance and glute strength.     Laly Fagan, PT   5/7/2024

## 2024-05-08 NOTE — PROGRESS NOTES
"[facility]   Physical Therapy Treatment Note     Name: Kenji Malone  Clinic Number: [id number]    Therapy Diagnosis:   Encounter Diagnoses   Name Primary?    Impaired functional mobility, balance, gait, and endurance Yes    Decreased strength involving knee joint     Impaired weight bearing        Physician: Order, Paper    Visit Date: 5/9/2024    Physician who did surgery: Mauricio Jaimes MD     Physician Orders: PT Eval and Treat   Medical Diagnosis from Referral: Postoperative state  Evaluation Date: 2/1/2024  Authorization Period Expiration: 12/31/2024  Plan of Care Expiration: 5/23/2024  Progress Note Due: 5/23/2024  Visit # / Visits authorized: 23/ 40(+eval)  FOTO: 2/3    PTA Visit #: 0/5     Time In: 16:15  Time Out: 16:57  Total Billable Time: 42 minutes    Subjective     Pt reports: he is doing well. No new complaints.     He was compliant with home exercise program.  Response to previous treatment: felt fine  Functional change: ongoing    Pain: 0/10  Location:  N/A     OBJECTIVE     Objective Measures updated at progress report unless specified.     Pt presents to session with RW for therapy session. Pt wearing hinged knee brace to R LE in "unlocked" position.     Treatment     Kenji received the treatments listed below:      therapeutic exercises to develop strength, endurance, ROM, flexibility, posture, and core stabilization for 42 minutes including:    Pt doffs knee brace for the following exercises:   3 x 10 reps R LE QS with 3 sec hold~ towel placed beneath knee and R shoe removed  3 x 10 reps supine R LE heel slides  3 x 10 reps supine R LE SLR~ extensor lag noted  3 x 10 reps prone R LE hip extension ~ 2# ankle weight added for 1 set  3 x 10 reps of prone R HS curls ~ 2# ankle weight added  3 x 10 reps supine glute bridges  3 x 10 reps R LE LAQ with 3 sec hold~ 5# ankle weight added    2 x 10 Seated R LE heel slides with overpressure from L LE   3 x 10 Seated KB " "deadlifts with 10# KB    2 x 10 trials of sit <> stand from mat without arm push-off, with R knee brace unlocked, SBA-CGA   3 x 30 sec NBOS stance hold, touchdown assist + SBA, with knee brace unlocked      Home Exercises Provided and Patient Education Provided     Education provided:   - home exercise program  -2/16/24: PT provided new exercise for home( SLR). PT also recommended that pt " unlock" his brace at all times( to encourage improved R knee flexion) and encouraged him to ambulate more at home with his RW. Education also provided to pt and mother regarding appropriate brace position.  -3/14/24: PT provided considerable education regarding pt's need to transition out of his current knee brace. PT even pointed to his ortho visit to [facility] on 1/25/24~ recommendation at that time was for pt to begin removing his brace. However, pt continues to wear his hinged knee brace(even during sleep). PT recommended pt work on sit<> stand trials at home without use of brace and to begin sleeping without brace.    Written Home Exercises Provided: Patient instructed to cont prior HEP.    See EMR under Patient Instructions for exercises provided 2/6/2024; 2/16/24    Assessment     Joshua continues to improve flexion in right knee, though added heel slides in sitting to achieve more range of motion. He is progressing past easier quad activation exercises and is ready to start focusing on functional strengthening in standing. He is still limited by glute weakness, though he has had notable improvements in standing balance and upright trunk positioning. He remains appropriate for outpatient neuro PT at this time.     Kenji Is progressing well towards his goals.   Pt prognosis is Good.     Pt will continue to benefit from skilled outpatient physical therapy to address the deficits listed in the problem list box on initial evaluation, provide pt/family education and to maximize pt's level of independence in the home and community " environment.     Pt's spiritual, cultural and educational needs considered and pt agreeable to plan of care and goals.     Anticipated barriers to physical therapy: co-morbidities    Goals:  Short Term Goals: 4 weeks   Patient will be independent with home exercise program. MET 3/28/2024  Patient will be able to tolerate standing for at least 5 minutes with LRAD in order to improve function at home. MET 3/28/2024 (with RW)  Patient will be able to ambulate at least 50 feet with LRAD and CGA only in order to improve his independence at home. MET 3/28/2024  Patient will improve R quad strength to at least 4-/5 in order to improve his ability to tolerate standing. MET 3/28/2024  Patient will be able to complete TUG, , MCTSIB, and  testing and set appropriate long term goals. Progressing, SSWS completed     Long Term Goals: 8 weeks   Patient will improve FOTO limitation to at least 39% in order to demonstrate improved perception of his abilities. ongoing  Patient will be able to tolerate 5 minutes of standing balance without AD in order to improve function at home. Progressing, ~2 min  Patient will be able to ambulate at least 300' with LRAD or no AD at [facility] in order to return to job at [employer]. MET 4/25/24  Patient will be able to improve R knee flexion to at least 110 degrees in order to demonstrate normal ROM in this extremity. Progressing  Pt will improve 5x chair rise score to </= 18s without UE assist for improved muscular endurance. Ongoing  Pt to perform 2 minute walk test for 350 feet or greater to improve gait speed & endurance. Ongoing      Plan     Continue to work on ambulation and stair negotiation as well as ROM of the R LE to the patient's tolerance. Focus more on standing balance and glute strength.     Laly Fagan, PT   5/9/2024

## 2024-05-09 ENCOUNTER — CLINICAL SUPPORT (OUTPATIENT)
Dept: REHABILITATION | Facility: HOSPITAL | Age: 19
End: 2024-05-09
Payer: MEDICAID

## 2024-05-09 DIAGNOSIS — Z74.09 IMPAIRED FUNCTIONAL MOBILITY, BALANCE, GAIT, AND ENDURANCE: Primary | ICD-10-CM

## 2024-05-09 DIAGNOSIS — R26.89 IMPAIRED WEIGHT BEARING: ICD-10-CM

## 2024-05-09 DIAGNOSIS — R29.898 DECREASED STRENGTH INVOLVING KNEE JOINT: ICD-10-CM

## 2024-05-09 PROCEDURE — 97110 THERAPEUTIC EXERCISES: CPT | Mod: PO

## 2024-05-14 ENCOUNTER — CLINICAL SUPPORT (OUTPATIENT)
Dept: REHABILITATION | Facility: HOSPITAL | Age: 19
End: 2024-05-14
Payer: MEDICAID

## 2024-05-14 DIAGNOSIS — Z74.09 IMPAIRED FUNCTIONAL MOBILITY, BALANCE, GAIT, AND ENDURANCE: Primary | ICD-10-CM

## 2024-05-14 DIAGNOSIS — R26.89 IMPAIRED WEIGHT BEARING: ICD-10-CM

## 2024-05-14 DIAGNOSIS — R29.898 DECREASED STRENGTH INVOLVING KNEE JOINT: ICD-10-CM

## 2024-05-14 PROCEDURE — 97110 THERAPEUTIC EXERCISES: CPT | Mod: PO

## 2024-05-14 NOTE — PROGRESS NOTES
"[facility]   Physical Therapy Treatment Note     Name: Kenji Malone  Clinic Number: [id number]    Therapy Diagnosis:   Encounter Diagnoses   Name Primary?    Impaired functional mobility, balance, gait, and endurance Yes    Decreased strength involving knee joint     Impaired weight bearing          Physician: Order, Paper    Visit Date: 5/14/2024    Physician who did surgery: Mauricio Jaimes MD     Physician Orders: PT Eval and Treat   Medical Diagnosis from Referral: Postoperative state  Evaluation Date: 2/1/2024  Authorization Period Expiration: 12/31/2024  Plan of Care Expiration: 5/23/2024  Progress Note Due: 5/23/2024  Visit # / Visits authorized: 24/ 40(+eval)  FOTO: 2/3    PTA Visit #: 0/5     Time In: 16:14  Time Out: 16:58  Total Billable Time: 44 minutes    Subjective     Pt reports: he is doing well and has been working on his knee range of motion.     He was compliant with home exercise program.  Response to previous treatment: felt fine  Functional change: ongoing    Pain: 0/10  Location:  N/A     OBJECTIVE     Objective Measures updated at progress report unless specified.     Pt presents to session with RW for therapy session. Pt wearing hinged knee brace to R LE in "unlocked" position.     Treatment     Kenji received the treatments listed below:      therapeutic exercises to develop strength, endurance, ROM, flexibility, posture, and core stabilization for 44 minutes including:    Pt doffs knee brace for the following exercises:   3 x 10 reps supine R LE SLR~ 1# ankle weight added  3 x 10 reps prone R LE hip extension  3 x 10 reps of prone R HS curls ~ 3# ankle weight added  3 x 10 reps supine glute bridges  3 x 10 reps R LE LAQ with 3 sec hold~ 5# ankle weight added  3 x 10 reps seated R LE heel slides with overpressure from L LE     3 x 10 reps seated KB deadlifts with 10# KB, with R knee brace unlocked    2 x 10 trials of sit <> stand from mat without arm push-off, " "with R knee brace unlocked, SBA-CGA   3 x 30 sec NBOS stance hold, touchdown assist + SBA, with knee brace unlocked    At ballet bar:  X4 laps gait training with no AD + PT CGA-min A  X2 laps each direction of side-stepping with BUE supported on bar      Home Exercises Provided and Patient Education Provided     Education provided:   - home exercise program  -2/16/24: PT provided new exercise for home( SLR). PT also recommended that pt " unlock" his brace at all times( to encourage improved R knee flexion) and encouraged him to ambulate more at home with his RW. Education also provided to pt and mother regarding appropriate brace position.  -3/14/24: PT provided considerable education regarding pt's need to transition out of his current knee brace. PT even pointed to his ortho visit to [facility] on 1/25/24~ recommendation at that time was for pt to begin removing his brace. However, pt continues to wear his hinged knee brace(even during sleep). PT recommended pt work on sit<> stand trials at home without use of brace and to begin sleeping without brace.    Written Home Exercises Provided: Patient instructed to cont prior HEP.    See EMR under Patient Instructions for exercises provided 2/6/2024; 2/16/24    Assessment     Joshua demonstrates improved knee range of motion on his right lower extremity but still with limited muscular endurance of glutes, quads. This is affecting his ability to maintain static standing balance without upper extremity support and also with rising to stand without upper extremity push-off. However, he is now performing seated KB deadlifts without significant issue, indicating overall improvement in glute strength. He continues to need CGA-min A for unsupported gait but has less loss of balance noted with gait training. He remains appropriate for outpatient neuro PT to maximize return to prior level of function.     Kenji Is progressing well towards his goals.   Pt prognosis is Good.     Pt will " continue to benefit from skilled outpatient physical therapy to address the deficits listed in the problem list box on initial evaluation, provide pt/family education and to maximize pt's level of independence in the home and community environment.     Pt's spiritual, cultural and educational needs considered and pt agreeable to plan of care and goals.     Anticipated barriers to physical therapy: co-morbidities    Goals:  Short Term Goals: 4 weeks   Patient will be independent with home exercise program. MET 3/28/2024  Patient will be able to tolerate standing for at least 5 minutes with LRAD in order to improve function at home. MET 3/28/2024 (with RW)  Patient will be able to ambulate at least 50 feet with LRAD and CGA only in order to improve his independence at home. MET 3/28/2024  Patient will improve R quad strength to at least 4-/5 in order to improve his ability to tolerate standing. MET 3/28/2024  Patient will be able to complete TUG, , MCTSIB, and  testing and set appropriate long term goals. Progressing, SSWS completed     Long Term Goals: 8 weeks   Patient will improve FOTO limitation to at least 39% in order to demonstrate improved perception of his abilities. ongoing  Patient will be able to tolerate 5 minutes of standing balance without AD in order to improve function at home. Progressing, ~2 min  Patient will be able to ambulate at least 300' with LRAD or no AD at [facility] in order to return to job at [employer]. MET 4/25/24  Patient will be able to improve R knee flexion to at least 110 degrees in order to demonstrate normal ROM in this extremity. Progressing  Pt will improve 5x chair rise score to </= 18s without UE assist for improved muscular endurance. Ongoing  Pt to perform 2 minute walk test for 350 feet or greater to improve gait speed & endurance. Ongoing      Plan     Continue to work on ambulation and stair negotiation as well as ROM of the R LE to the patient's tolerance. Focus  more on standing balance and glute strength.     Laly Fagan, PT   5/14/2024

## 2024-05-21 ENCOUNTER — CLINICAL SUPPORT (OUTPATIENT)
Dept: REHABILITATION | Facility: HOSPITAL | Age: 19
End: 2024-05-21
Payer: MEDICAID

## 2024-05-21 DIAGNOSIS — R26.89 IMPAIRED WEIGHT BEARING: ICD-10-CM

## 2024-05-21 DIAGNOSIS — Z74.09 IMPAIRED FUNCTIONAL MOBILITY, BALANCE, GAIT, AND ENDURANCE: Primary | ICD-10-CM

## 2024-05-21 DIAGNOSIS — R29.898 DECREASED STRENGTH INVOLVING KNEE JOINT: ICD-10-CM

## 2024-05-21 PROCEDURE — 97110 THERAPEUTIC EXERCISES: CPT | Mod: PO

## 2024-05-21 NOTE — PROGRESS NOTES
"See PT updated POC in plan of care.    Laly Fagan, PT, DPT, NCS    [facility]   Physical Therapy Treatment Note     Name: Kenji Malone  Clinic Number: [id number]    Therapy Diagnosis:   Encounter Diagnoses   Name Primary?    Impaired functional mobility, balance, gait, and endurance Yes    Decreased strength involving knee joint     Impaired weight bearing            Physician: Order, Paper    Visit Date: 5/21/2024    Physician who did surgery: Mauricio Jaimes MD     Physician Orders: PT Eval and Treat   Medical Diagnosis from Referral: Postoperative state  Evaluation Date: 2/1/2024  Authorization Period Expiration: 12/31/2024  Plan of Care Expiration: 8/13/2024  Progress Note Due: 6/21/2024  Visit # / Visits authorized: 25/ 40(+eval)  FOTO: 2/3    PTA Visit #: 0/5     Time In: 16:17  Time Out: 16:59  Total Billable Time: 42 minutes    Subjective     Pt reports: he had to cancel his last appt because he had a cold and didn't feel well. He feels better today.      He was compliant with home exercise program.  Response to previous treatment: good  Functional change: ongoing    Pain: 0/10  Location:  N/A     OBJECTIVE     Objective Measures updated at progress report unless specified.     Pt presents to session with RW for therapy session. Pt wearing hinged knee brace to R LE in "unlocked" position.     Measurements taken with brace off.   RLE ROM:  Knee flexion = 105 deg before reported pain  Knee extension = 0 deg     LLE ROM:  Knee flexion = 132 deg  Knee extension = lacking 10 deg even with overpressure     Lower Extremity Strength~ tested in sitting  Right LE 10/24/23 previous POC Eval 2/6/2024    2/29/24 3/28/24 4/24/24 5/21/24 Left LE 10/24/23  Previous POC Eval 2/6/2024    2/9/24 3/28/24 4/24/24 5/21/24   Hip flexion:  5/5 5/5 5/5 5/5 5/5 5/5 Hip flexion: 5/5 5/5 5/5 5/5 5/5 5/5   Knee extension: 5/5 3/5 3+/5 4/5 4+/5 4+/5 Knee extension: 5/5 5/5 5/5 5/5 5/5 5/5   Knee flexion: " 4+/5 4-/5* 4/5* 4/5 4/5 4/5 Knee flexion: 3/5 3/5 3+/5 4-/5 4-/5 4-/5   Ankle dorsiflexion:  1+/5 1/5 2/5 2+/5 2/5 2+/5 Ankle dorsiflexion: trace trace/5 1/5 1/5 1/5 1/5   Ankle plantarflexion:  1/5 1/5 1/5 1/5 1/5 2-/5 Ankle plantarflexion: trace trace/5 1/5 1/5 1/5 1/5   Hip abduction: 3+/5 to 4-/5* 3+/5 3+/5 3+/5 3+/5 3+/5 Hip abduction: 3+/5* 3+/5 3+/5 4-/5 4/5 4/5   Hip adduction: 5/5 5/5 5/5 5/5 5/5 5/5 Hip adduction 5/5 5/5 5/5 5/5 5/5 5/5   Hip extension: 4/5 3+/5 4/5 4/5 4/5 4/5 Hip extension: 5/5 5/5 4-/5 4/5 4+/5 4+/5   *within range of motion     5x STS: 16 seconds with arms, 23 seconds without arms (1 instance of steadying him during loss of balance)     Gait assessments done with hinged knee brace ON     Self-Selected Gait Speed:   Trial 1: 6 meters in 8 seconds, 0.75 m/s   Trial 2: 6 meters in 7 seconds, 0.86 m/s   Avg = 0.81 m/s       2-Minute Walk Test  AD used: rolling walker  Distance: 285 ft    Timed Up and Go:   AD used: rolling walker   Time: 14 seconds    MCTSIB:  Condition 1 = 24 sec  Condition 2 = 2 sec  Condition 3 = 5 sec  Condition 4 = 1 sec    Treatment     Kenji received the treatments listed below:      therapeutic exercises to develop strength, endurance, ROM, flexibility, posture, and core stabilization for 42 minutes including:    Objective testing as above    Pt doffs knee brace for the following exercises:   3 x 10 reps supine R LE SLR~ 1# ankle weight added  3 x 10 reps prone R LE hip extension  3 x 10 reps of prone R HS curls ~ 3# ankle weight added  3 x 10 reps supine glute bridges  3 x 10 reps R LE LAQ with 3 sec hold~ 5# ankle weight added  3 x 10 reps seated R LE heel slides with overpressure from L LE - not performed today    Not performed today:  3 x 10 reps seated KB deadlifts with 10# KB, with R knee brace unlocked    2 x 10 trials of sit <> stand from mat without arm push-off, with R knee brace unlocked, SBA-CGA   3 x 30 sec NBOS stance hold, touchdown assist + SBA,  "with knee brace unlocked    At ballet bar:  X4 laps gait training with no AD + PT CGA-min A  X2 laps each direction of side-stepping with BUE supported on bar      Home Exercises Provided and Patient Education Provided     Education provided:   - home exercise program  -2/16/24: PT provided new exercise for home( SLR). PT also recommended that pt " unlock" his brace at all times( to encourage improved R knee flexion) and encouraged him to ambulate more at home with his RW. Education also provided to pt and mother regarding appropriate brace position.  -3/14/24: PT provided considerable education regarding pt's need to transition out of his current knee brace. PT even pointed to his ortho visit to [facility] on 1/25/24~ recommendation at that time was for pt to begin removing his brace. However, pt continues to wear his hinged knee brace(even during sleep). PT recommended pt work on sit<> stand trials at home without use of brace and to begin sleeping without brace.    Written Home Exercises Provided: Patient instructed to cont prior HEP.    See EMR under Patient Instructions for exercises provided 2/6/2024; 2/16/24    Assessment     Joshua is progressing with R knee range of motion and overall right lower extremity strength, though still remains limited by hip weakness which is leading to deficits with stabilizing in standing as well as unsupported gait activities. However, he is improving his gait speed and endurance when utilizing the rolling walker with it approaching his baseline without an AD prior to surgery. He is making steady gains towards goals and is appropriate in an extension of his plan of care to further progress strength, range of motion, and functional mobility independence.     Kenji Is progressing well towards his goals.   Pt prognosis is Good.     Pt will continue to benefit from skilled outpatient physical therapy to address the deficits listed in the problem list box on initial evaluation, provide " pt/family education and to maximize pt's level of independence in the home and community environment.     Pt's spiritual, cultural and educational needs considered and pt agreeable to plan of care and goals.     Anticipated barriers to physical therapy: co-morbidities    Goals:  Short Term Goals: 4 weeks   Patient will be independent with home exercise program. MET 3/28/2024  Patient will be able to tolerate standing for at least 5 minutes with LRAD in order to improve function at home. MET 3/28/2024 (with RW)  Patient will be able to ambulate at least 50 feet with LRAD and CGA only in order to improve his independence at home. MET 3/28/2024  Patient will improve R quad strength to at least 4-/5 in order to improve his ability to tolerate standing. MET 3/28/2024  Patient will be able to complete TUG, , MCTSIB, and  testing and set appropriate long term goals. MET 5/21/2024     Long Term Goals: 8 weeks   Patient will improve FOTO limitation to at least 39% in order to demonstrate improved perception of his abilities. ongoing  Patient will be able to tolerate 5 minutes of standing balance without AD in order to improve function at home. Progressing, ~2 min  Patient will be able to ambulate at least 300' with LRAD or no AD at [facility] in order to return to job at [employer]. MET 4/25/24  Patient will be able to improve R knee flexion to at least 110 degrees in order to demonstrate normal ROM in this extremity. Progressing  Pt will improve 5x chair rise score to </= 18s without UE assist for improved muscular endurance. Ongoing  Pt to perform 2 minute walk test for 350 feet or greater to improve gait speed & endurance. Ongoing  Pt will improve postural control with MCTSIB condition 1 score of at least 30s for decreased fall risk with standing ADLs. Added 5/21/24  Pt will improve postural control with MCTSIB condition 2 score of at least 12s for decreased fall risk with standing ADLs. Added 5/21/24      Plan      Plan of care Certification: 5/21/2024 to 8/13/2024.     Outpatient Physical Therapy 2 times weekly for 12 weeks to include the following interventions: Gait Training, Manual Therapy, Moist Heat/ Ice, Neuromuscular Re-ed, Orthotic Management and Training, Patient Education, Self Care, Therapeutic Activities, and Therapeutic Exercise. Plan may be carried out by a PTA.    Continue to work on ambulation and stair negotiation as well as ROM of the R LE to the patient's tolerance. Focus more on standing balance and glute strength.     Laly Fagan, PT   5/21/2024

## 2024-05-21 NOTE — PLAN OF CARE
"[facility]   Physical Therapy Treatment Note     Name: Kenji Malone  Clinic Number: [id number]    Therapy Diagnosis:   Encounter Diagnoses   Name Primary?    Impaired functional mobility, balance, gait, and endurance Yes    Decreased strength involving knee joint     Impaired weight bearing            Physician: Order, Paper    Visit Date: 5/21/2024    Physician who did surgery: Mauricio Jaimes MD     Physician Orders: PT Eval and Treat   Medical Diagnosis from Referral: Postoperative state  Evaluation Date: 2/1/2024  Authorization Period Expiration: 12/31/2024  Plan of Care Expiration: 8/13/2024  Progress Note Due: 6/21/2024  Visit # / Visits authorized: 25/ 40(+eval)  FOTO: 2/3    PTA Visit #: 0/5     Time In: 16:17  Time Out: 16:59  Total Billable Time: 42 minutes    Subjective     Pt reports: he had to cancel his last appt because he had a cold and didn't feel well. He feels better today.      He was compliant with home exercise program.  Response to previous treatment: good  Functional change: ongoing    Pain: 0/10  Location:  N/A     OBJECTIVE     Objective Measures updated at progress report unless specified.     Pt presents to session with RW for therapy session. Pt wearing hinged knee brace to R LE in "unlocked" position.     Measurements taken with brace off.   RLE ROM:  Knee flexion = 105 deg before reported pain  Knee extension = 0 deg     LLE ROM:  Knee flexion = 132 deg  Knee extension = lacking 10 deg even with overpressure     Lower Extremity Strength~ tested in sitting  Right LE 10/24/23 previous POC Eval 2/6/2024    2/29/24 3/28/24 4/24/24 5/21/24 Left LE 10/24/23  Previous POC Eval 2/6/2024    2/9/24 3/28/24 4/24/24 5/21/24   Hip flexion:  5/5 5/5 5/5 5/5 5/5 5/5 Hip flexion: 5/5 5/5 5/5 5/5 5/5 5/5   Knee extension: 5/5 3/5 3+/5 4/5 4+/5 4+/5 Knee extension: 5/5 5/5 5/5 5/5 5/5 5/5   Knee flexion: 4+/5 4-/5* 4/5* 4/5 4/5 4/5 Knee flexion: 3/5 3/5 3+/5 4-/5 4-/5 4-/5 "   Ankle dorsiflexion:  1+/5 1/5 2/5 2+/5 2/5 2+/5 Ankle dorsiflexion: trace trace/5 1/5 1/5 1/5 1/5   Ankle plantarflexion:  1/5 1/5 1/5 1/5 1/5 2-/5 Ankle plantarflexion: trace trace/5 1/5 1/5 1/5 1/5   Hip abduction: 3+/5 to 4-/5* 3+/5 3+/5 3+/5 3+/5 3+/5 Hip abduction: 3+/5* 3+/5 3+/5 4-/5 4/5 4/5   Hip adduction: 5/5 5/5 5/5 5/5 5/5 5/5 Hip adduction 5/5 5/5 5/5 5/5 5/5 5/5   Hip extension: 4/5 3+/5 4/5 4/5 4/5 4/5 Hip extension: 5/5 5/5 4-/5 4/5 4+/5 4+/5   *within range of motion     5x STS: 16 seconds with arms, 23 seconds without arms (1 instance of steadying him during loss of balance)     Gait assessments done with hinged knee brace ON     Self-Selected Gait Speed:   Trial 1: 6 meters in 8 seconds, 0.75 m/s   Trial 2: 6 meters in 7 seconds, 0.86 m/s   Avg = 0.81 m/s       2-Minute Walk Test  AD used: rolling walker  Distance: 285 ft    Timed Up and Go:   AD used: rolling walker   Time: 14 seconds    MCTSIB:  Condition 1 = 24 sec  Condition 2 = 2 sec  Condition 3 = 5 sec  Condition 4 = 1 sec    Treatment     Kenji received the treatments listed below:      therapeutic exercises to develop strength, endurance, ROM, flexibility, posture, and core stabilization for 42 minutes including:    Objective testing as above    Pt doffs knee brace for the following exercises:   3 x 10 reps supine R LE SLR~ 1# ankle weight added  3 x 10 reps prone R LE hip extension  3 x 10 reps of prone R HS curls ~ 3# ankle weight added  3 x 10 reps supine glute bridges  3 x 10 reps R LE LAQ with 3 sec hold~ 5# ankle weight added  3 x 10 reps seated R LE heel slides with overpressure from L LE - not performed today    Not performed today:  3 x 10 reps seated KB deadlifts with 10# KB, with R knee brace unlocked    2 x 10 trials of sit <> stand from mat without arm push-off, with R knee brace unlocked, SBA-CGA   3 x 30 sec NBOS stance hold, touchdown assist + SBA, with knee brace unlocked    At ballet bar:  X4 laps gait training with  "no AD + PT CGA-min A  X2 laps each direction of side-stepping with BUE supported on bar      Home Exercises Provided and Patient Education Provided     Education provided:   - home exercise program  -2/16/24: PT provided new exercise for home( SLR). PT also recommended that pt " unlock" his brace at all times( to encourage improved R knee flexion) and encouraged him to ambulate more at home with his RW. Education also provided to pt and mother regarding appropriate brace position.  -3/14/24: PT provided considerable education regarding pt's need to transition out of his current knee brace. PT even pointed to his ortho visit to [facility] on 1/25/24~ recommendation at that time was for pt to begin removing his brace. However, pt continues to wear his hinged knee brace(even during sleep). PT recommended pt work on sit<> stand trials at home without use of brace and to begin sleeping without brace.    Written Home Exercises Provided: Patient instructed to cont prior HEP.    See EMR under Patient Instructions for exercises provided 2/6/2024; 2/16/24    Assessment     Joshua is progressing with R knee range of motion and overall right lower extremity strength, though still remains limited by hip weakness which is leading to deficits with stabilizing in standing as well as unsupported gait activities. However, he is improving his gait speed and endurance when utilizing the rolling walker with it approaching his baseline without an AD prior to surgery. He is making steady gains towards goals and is appropriate in an extension of his plan of care to further progress strength, range of motion, and functional mobility independence.     Kenji Is progressing well towards his goals.   Pt prognosis is Good.     Pt will continue to benefit from skilled outpatient physical therapy to address the deficits listed in the problem list box on initial evaluation, provide pt/family education and to maximize pt's level of independence in the home " and community environment.     Pt's spiritual, cultural and educational needs considered and pt agreeable to plan of care and goals.     Anticipated barriers to physical therapy: co-morbidities    Goals:  Short Term Goals: 4 weeks   Patient will be independent with home exercise program. MET 3/28/2024  Patient will be able to tolerate standing for at least 5 minutes with LRAD in order to improve function at home. MET 3/28/2024 (with RW)  Patient will be able to ambulate at least 50 feet with LRAD and CGA only in order to improve his independence at home. MET 3/28/2024  Patient will improve R quad strength to at least 4-/5 in order to improve his ability to tolerate standing. MET 3/28/2024  Patient will be able to complete TUG, , MCTSIB, and  testing and set appropriate long term goals. MET 5/21/2024     Long Term Goals: 8 weeks   Patient will improve FOTO limitation to at least 39% in order to demonstrate improved perception of his abilities. ongoing  Patient will be able to tolerate 5 minutes of standing balance without AD in order to improve function at home. Progressing, ~2 min  Patient will be able to ambulate at least 300' with LRAD or no AD at [facility] in order to return to job at [employer]. MET 4/25/24  Patient will be able to improve R knee flexion to at least 110 degrees in order to demonstrate normal ROM in this extremity. Progressing  Pt will improve 5x chair rise score to </= 18s without UE assist for improved muscular endurance. Ongoing  Pt to perform 2 minute walk test for 350 feet or greater to improve gait speed & endurance. Ongoing  Pt will improve postural control with MCTSIB condition 1 score of at least 30s for decreased fall risk with standing ADLs. Added 5/21/24  Pt will improve postural control with MCTSIB condition 2 score of at least 12s for decreased fall risk with standing ADLs. Added 5/21/24      Plan     Plan of care Certification: 5/21/2024 to 8/13/2024.     Outpatient  Physical Therapy 2 times weekly for 12 weeks to include the following interventions: Gait Training, Manual Therapy, Moist Heat/ Ice, Neuromuscular Re-ed, Orthotic Management and Training, Patient Education, Self Care, Therapeutic Activities, and Therapeutic Exercise. Plan may be carried out by a PTA.    Continue to work on ambulation and stair negotiation as well as ROM of the R LE to the patient's tolerance. Focus more on standing balance and glute strength.     Laly Fagan, PT   5/21/2024

## 2024-05-23 ENCOUNTER — CLINICAL SUPPORT (OUTPATIENT)
Dept: REHABILITATION | Facility: HOSPITAL | Age: 19
End: 2024-05-23
Payer: MEDICAID

## 2024-05-23 DIAGNOSIS — Z74.09 IMPAIRED FUNCTIONAL MOBILITY, BALANCE, GAIT, AND ENDURANCE: Primary | ICD-10-CM

## 2024-05-23 DIAGNOSIS — R29.898 DECREASED STRENGTH INVOLVING KNEE JOINT: ICD-10-CM

## 2024-05-23 DIAGNOSIS — R26.89 IMPAIRED WEIGHT BEARING: ICD-10-CM

## 2024-05-23 PROCEDURE — 97110 THERAPEUTIC EXERCISES: CPT | Mod: PO,CQ

## 2024-05-23 NOTE — PROGRESS NOTES
"  [facility]   Physical Therapy Treatment Note     Name: Kenji Malone  Clinic Number: [id number]    Therapy Diagnosis:   Encounter Diagnoses   Name Primary?    Impaired functional mobility, balance, gait, and endurance Yes    Decreased strength involving knee joint     Impaired weight bearing              Physician: Order, Paper    Visit Date: 5/23/2024    Physician who did surgery: Mauricio Jaimes MD     Physician Orders: PT Eval and Treat   Medical Diagnosis from Referral: Postoperative state  Evaluation Date: 2/1/2024  Authorization Period Expiration: 12/31/2024  Plan of Care Expiration: 8/13/2024  Progress Note Due: 6/21/2024  Visit # / Visits authorized: 26/ 40(+eval)  FOTO: 2/3    PTA Visit #: 1/5     Time In: 1530  Time Out: 16:15  Total Billable Time: 45 minutes    Subjective     Pt reports: " I'm doing pretty good."     He was compliant with home exercise program.  Response to previous treatment: good  Functional change: ongoing    Pain: 0/10  Location:  N/A     OBJECTIVE     Objective Measures updated at progress report unless specified.       Treatment     Kenji received the treatments listed below:      therapeutic exercises to develop strength, endurance, ROM, flexibility, posture, and core stabilization for 42 minutes including:      Pt doffs knee brace for the following exercises:   3 x 10 reps supine R LE SLR~ 1# ankle weight added  3 x 10 reps prone R LE hip extension  3 x 10 reps of prone R HS curls ~ 3# ankle weight added  3 x 10 reps supine glute bridges  3 x 10 reps R LE LAQ with 3 sec hold~ 5# ankle weight added  3 x 10 reps seated R LE HS curls with creeper and GTB    2 x 10 trials of sit <> stand from mat without arm push-off, with R knee brace unlocked, SBA-CGA     Near ball bar:   X 4 laps of side stepping with 1-2 UE support  X 4 laps of forward ambulation with no UE support  X 4 laps of backward ambulation with 1 UE support    Pt ambulated ~ 200ft with no " "A.D and CGA.  Pt required the use of the walls x 2 times for balance.  VC's for arm swing  Home Exercises Provided and Patient Education Provided     Education provided:   - home exercise program  -2/16/24: PT provided new exercise for home( SLR). PT also recommended that pt " unlock" his brace at all times( to encourage improved R knee flexion) and encouraged him to ambulate more at home with his RW. Education also provided to pt and mother regarding appropriate brace position.  -3/14/24: PT provided considerable education regarding pt's need to transition out of his current knee brace. PT even pointed to his ortho visit to [facility] on 1/25/24~ recommendation at that time was for pt to begin removing his brace. However, pt continues to wear his hinged knee brace(even during sleep). PT recommended pt work on sit<> stand trials at home without use of brace and to begin sleeping without brace.    Written Home Exercises Provided: Patient instructed to cont prior HEP.    See EMR under Patient Instructions for exercises provided 2/6/2024; 2/16/24    Assessment     Joshua tolerated his tx session well and cont to progress towards his goals.  Joshua was able to ambulate down the hallway today with no assistive device and performed HS curls with the creeper and green theraband for strengthening.  Cont with plan of care.     Kenji Is progressing well towards his goals.   Pt prognosis is Good.     Pt will continue to benefit from skilled outpatient physical therapy to address the deficits listed in the problem list box on initial evaluation, provide pt/family education and to maximize pt's level of independence in the home and community environment.     Pt's spiritual, cultural and educational needs considered and pt agreeable to plan of care and goals.     Anticipated barriers to physical therapy: co-morbidities    Goals:  Short Term Goals: 4 weeks   Patient will be independent with home exercise program. MET 3/28/2024  Patient " will be able to tolerate standing for at least 5 minutes with LRAD in order to improve function at home. MET 3/28/2024 (with RW)  Patient will be able to ambulate at least 50 feet with LRAD and CGA only in order to improve his independence at home. MET 3/28/2024  Patient will improve R quad strength to at least 4-/5 in order to improve his ability to tolerate standing. MET 3/28/2024  Patient will be able to complete TUG, , MCTSIB, and  testing and set appropriate long term goals. MET 5/21/2024     Long Term Goals: 8 weeks   Patient will improve FOTO limitation to at least 39% in order to demonstrate improved perception of his abilities. ongoing  Patient will be able to tolerate 5 minutes of standing balance without AD in order to improve function at home. Progressing, ~2 min  Patient will be able to ambulate at least 300' with LRAD or no AD at [facility] in order to return to job at [employer]. MET 4/25/24  Patient will be able to improve R knee flexion to at least 110 degrees in order to demonstrate normal ROM in this extremity. Progressing  Pt will improve 5x chair rise score to </= 18s without UE assist for improved muscular endurance. Ongoing  Pt to perform 2 minute walk test for 350 feet or greater to improve gait speed & endurance. Ongoing  Pt will improve postural control with MCTSIB condition 1 score of at least 30s for decreased fall risk with standing ADLs. Added 5/21/24  Pt will improve postural control with MCTSIB condition 2 score of at least 12s for decreased fall risk with standing ADLs. Added 5/21/24      Plan       Continue to work on ambulation and stair negotiation as well as ROM of the R LE to the patient's tolerance. Focus more on standing balance and glute strength.     Marian Mercado, PTA   5/23/2024

## 2024-05-24 ENCOUNTER — DOCUMENTATION ONLY (OUTPATIENT)
Dept: REHABILITATION | Facility: HOSPITAL | Age: 19
End: 2024-05-24
Payer: MEDICAID

## 2024-05-24 NOTE — PROGRESS NOTES
PT/PTA met face to face to discuss pt's treatment plan and progress towards established goals.  Continue with current PT POC with focus on ROM, gait with out the A.D.  Patient will be seen by physical therapist at least every sixth treatment or 30 days, whichever occurs first.    Marian Mercado, PTA  05/24/2024

## 2024-05-28 ENCOUNTER — CLINICAL SUPPORT (OUTPATIENT)
Dept: REHABILITATION | Facility: HOSPITAL | Age: 19
End: 2024-05-28
Payer: MEDICAID

## 2024-05-28 DIAGNOSIS — R29.898 DECREASED STRENGTH INVOLVING KNEE JOINT: ICD-10-CM

## 2024-05-28 DIAGNOSIS — Z74.09 IMPAIRED FUNCTIONAL MOBILITY, BALANCE, GAIT, AND ENDURANCE: Primary | ICD-10-CM

## 2024-05-28 DIAGNOSIS — R26.89 IMPAIRED WEIGHT BEARING: ICD-10-CM

## 2024-05-28 PROCEDURE — 97110 THERAPEUTIC EXERCISES: CPT | Mod: PO

## 2024-05-28 NOTE — PROGRESS NOTES
[facility]   Physical Therapy Treatment Note     Name: Kenji Malone  Clinic Number: [id number]    Therapy Diagnosis:   Encounter Diagnoses   Name Primary?    Impaired functional mobility, balance, gait, and endurance Yes    Decreased strength involving knee joint     Impaired weight bearing          Physician: Order, Paper    Visit Date: 5/28/2024    Physician who did surgery: Mauricio Jaimes MD     Physician Orders: PT Eval and Treat   Medical Diagnosis from Referral: Postoperative state  Evaluation Date: 2/1/2024  Authorization Period Expiration: 12/31/2024  Plan of Care Expiration: 8/13/2024  Progress Note Due: 6/21/2024  Visit # / Visits authorized: 27/ 40(+eval)  FOTO: 2/3    PTA Visit #: 0/5     Time In: 16:18  Time Out: 16:58  Total Billable Time: 40 minutes    Subjective     Pt reports: he is walking more in the house without the rolling walker but still has to use furniture and the walls.     He was compliant with home exercise program.  Response to previous treatment: good  Functional change: ongoing    Pain: 0/10  Location:  N/A     OBJECTIVE     Objective Measures updated at progress report unless specified.       Treatment     Kenji received the treatments listed below:      therapeutic exercises to develop strength, endurance, ROM, flexibility, posture, and core stabilization for 40 minutes including:    Pt doffs knee brace for the following exercises:   3 x 10 reps supine R LE SLR~ 1# ankle weight added  3 x 10 reps prone R LE hip extension  3 x 10 reps of prone R HS curls ~ 3# ankle weight added  3 x 10 reps supine glute bridges  3 x 10 reps R LE LAQ with 3 sec hold~ 5# ankle weight added  3 x 10 reps seated R LE HS curls with creeper and GTB ~ cues to keep foot flat to encourage more hamstring     3 x 10 reps seated KB deadlifts with 10# KB, with R knee brace unlocked  2 x 8 trials of sit <> stand from mat without arm push-off, with R knee brace unlocked, SBA-CGA   3 x  "30 sec NBOS stance hold, touchdown assist + SBA, with knee brace unlocked     Pt ambulated with no A.D and PT CGA for 3 trials. VC's for arm swing.  Trial 1.) 200 ft, 2.) 175 ft, 3.) 145 ft    Home Exercises Provided and Patient Education Provided     Education provided:   - home exercise program  -2/16/24: PT provided new exercise for home( SLR). PT also recommended that pt " unlock" his brace at all times( to encourage improved R knee flexion) and encouraged him to ambulate more at home with his RW. Education also provided to pt and mother regarding appropriate brace position.  -3/14/24: PT provided considerable education regarding pt's need to transition out of his current knee brace. PT even pointed to his ortho visit to [facility] on 1/25/24~ recommendation at that time was for pt to begin removing his brace. However, pt continues to wear his hinged knee brace(even during sleep). PT recommended pt work on sit<> stand trials at home without use of brace and to begin sleeping without brace.    Written Home Exercises Provided: Patient instructed to cont prior HEP.    See EMR under Patient Instructions for exercises provided 2/6/2024; 2/16/24    Assessment     Joshua is making great progress with functional mobility, demonstrating consistent ability to ambulate for >100 ft without an assistive device but PT CGA. His gait also appears to be approaching the similar pattern as prior to surgery, indicating increased independence. He continues to struggle with hamstring activation on the right lower extremity, remaining a limiting factor in achieving necessary range of motion but overall progressing.      Kenji Is progressing well towards his goals.   Pt prognosis is Good.     Pt will continue to benefit from skilled outpatient physical therapy to address the deficits listed in the problem list box on initial evaluation, provide pt/family education and to maximize pt's level of independence in the home and community " environment.     Pt's spiritual, cultural and educational needs considered and pt agreeable to plan of care and goals.     Anticipated barriers to physical therapy: co-morbidities    Goals:  Short Term Goals: 4 weeks   Patient will be independent with home exercise program. MET 3/28/2024  Patient will be able to tolerate standing for at least 5 minutes with LRAD in order to improve function at home. MET 3/28/2024 (with RW)  Patient will be able to ambulate at least 50 feet with LRAD and CGA only in order to improve his independence at home. MET 3/28/2024  Patient will improve R quad strength to at least 4-/5 in order to improve his ability to tolerate standing. MET 3/28/2024  Patient will be able to complete TUG, , MCTSIB, and  testing and set appropriate long term goals. MET 5/21/2024     Long Term Goals: 8 weeks   Patient will improve FOTO limitation to at least 39% in order to demonstrate improved perception of his abilities. ongoing  Patient will be able to tolerate 5 minutes of standing balance without AD in order to improve function at home. Progressing, ~2 min  Patient will be able to ambulate at least 300' with LRAD or no AD at [facility] in order to return to job at [employer]. MET 4/25/24  Patient will be able to improve R knee flexion to at least 110 degrees in order to demonstrate normal ROM in this extremity. Progressing  Pt will improve 5x chair rise score to </= 18s without UE assist for improved muscular endurance. Ongoing  Pt to perform 2 minute walk test for 350 feet or greater to improve gait speed & endurance. Ongoing  Pt will improve postural control with MCTSIB condition 1 score of at least 30s for decreased fall risk with standing ADLs. Added 5/21/24  Pt will improve postural control with MCTSIB condition 2 score of at least 12s for decreased fall risk with standing ADLs. Added 5/21/24      Plan     Continue to work on ambulation and stair negotiation as well as ROM of the R LE to the  patient's tolerance. Focus more on standing balance and glute strength.     Laly Fagan, PT   5/28/2024

## 2024-05-30 ENCOUNTER — CLINICAL SUPPORT (OUTPATIENT)
Dept: REHABILITATION | Facility: HOSPITAL | Age: 19
End: 2024-05-30
Payer: MEDICAID

## 2024-05-30 DIAGNOSIS — R29.898 DECREASED STRENGTH INVOLVING KNEE JOINT: ICD-10-CM

## 2024-05-30 DIAGNOSIS — R26.89 IMPAIRED WEIGHT BEARING: ICD-10-CM

## 2024-05-30 DIAGNOSIS — Z74.09 IMPAIRED FUNCTIONAL MOBILITY, BALANCE, GAIT, AND ENDURANCE: Primary | ICD-10-CM

## 2024-05-30 PROCEDURE — 97110 THERAPEUTIC EXERCISES: CPT | Mod: PO

## 2024-05-30 NOTE — PROGRESS NOTES
[facility]   Physical Therapy Treatment Note     Name: Kenji Malone  Clinic Number: [id number]    Therapy Diagnosis:   Encounter Diagnoses   Name Primary?    Impaired functional mobility, balance, gait, and endurance Yes    Decreased strength involving knee joint     Impaired weight bearing          Physician: Order, Paper    Visit Date: 5/30/2024    Physician who did surgery: Mauricio Jaimes MD     Physician Orders: PT Eval and Treat   Medical Diagnosis from Referral: Postoperative state  Evaluation Date: 2/1/2024  Authorization Period Expiration: 12/31/2024  Plan of Care Expiration: 8/13/2024  Progress Note Due: 6/21/2024  Visit # / Visits authorized: 28/ 40(+eval)  FOTO: 2/3    PTA Visit #: 0/5     Time In: 16:12  Time Out: 16:58  Total Billable Time: 46 minutes (TE only Medicaid)    Subjective     Pt reports: he's doing well, no new complaints.     He was compliant with home exercise program.  Response to previous treatment: good  Functional change: ongoing    Pain: 0/10  Location:  N/A     OBJECTIVE     Objective Measures updated at progress report unless specified.       Treatment     Kenji received the treatments listed below:      therapeutic exercises to develop strength, endurance, ROM, flexibility, posture, and core stabilization for 46 minutes including:    Pt doffs knee brace for the following exercises:   3 x 10 reps supine R LE SLR~ 1# ankle weight added  3 x 10 reps prone R LE hip extension  3 x 10 reps of prone R HS curls ~ 3# ankle weight added  3 x 10 reps supine glute bridges  3 x 10 reps R LE LAQ with 3 sec hold~ 5# ankle weight added  3 x 10 reps seated R LE HS curls with creeper and GTB ~ cues to keep foot flat to encourage more hamstring     3 x 10 reps seated KB deadlifts with 10# KB, with R knee brace unlocked  2 x 10 trials of sit <> stand from mat without arm push-off, with R knee brace unlocked, SBA-CGA   3 x 30 sec NBOS stance hold, touchdown assist + SBA,  "with knee brace unlocked     Pt ambulated with no A.D with R knee brace unlocked and PT CGA for x2 trials. VC's for arm swing.  Trial 1.) 239 ft, 2.) 197 ft     Home Exercises Provided and Patient Education Provided     Education provided:   - home exercise program  -2/16/24: PT provided new exercise for home( SLR). PT also recommended that pt " unlock" his brace at all times( to encourage improved R knee flexion) and encouraged him to ambulate more at home with his RW. Education also provided to pt and mother regarding appropriate brace position.  -3/14/24: PT provided considerable education regarding pt's need to transition out of his current knee brace. PT even pointed to his ortho visit to [facility] on -1/25/24~ recommendation at that time was for pt to begin removing his brace. However, pt continues to wear his hinged knee brace(even during sleep). PT recommended pt work on sit<> stand trials at home without use of brace and to begin sleeping without brace.  -5/30/24: need to loosen brace if it is causing skin breakdown and to allow for some room at each strap; reinforcement of edu re: how to properly don brace      Written Home Exercises Provided: Patient instructed to cont prior HEP.    See EMR under Patient Instructions for exercises provided 2/6/2024; 2/16/24    Assessment     Joshua had more trouble today performing hamstring curls with a significant hip flexion/pike noted and more rest breaks needed. He may need to reduce weight of exercise and work on technique to improve strength through full range. He also required reinforcement today on how to properly don his hinged knee brace and to check his skin as PT noted some redness and mild skin breakdown at his medial knee. He does demonstrate improved ability to stabilize when rising to stand without use of his arms today and has continued progress with gait as well. He remains appropriate for OP neuro PT to maximize return to prior level of function.     Kenji " Is progressing well towards his goals.   Pt prognosis is Good.     Pt will continue to benefit from skilled outpatient physical therapy to address the deficits listed in the problem list box on initial evaluation, provide pt/family education and to maximize pt's level of independence in the home and community environment.     Pt's spiritual, cultural and educational needs considered and pt agreeable to plan of care and goals.     Anticipated barriers to physical therapy: co-morbidities    Goals:  Short Term Goals: 4 weeks   Patient will be independent with home exercise program. MET 3/28/2024  Patient will be able to tolerate standing for at least 5 minutes with LRAD in order to improve function at home. MET 3/28/2024 (with RW)  Patient will be able to ambulate at least 50 feet with LRAD and CGA only in order to improve his independence at home. MET 3/28/2024  Patient will improve R quad strength to at least 4-/5 in order to improve his ability to tolerate standing. MET 3/28/2024  Patient will be able to complete TUG, , MCTSIB, and  testing and set appropriate long term goals. MET 5/21/2024     Long Term Goals: 8 weeks   Patient will improve FOTO limitation to at least 39% in order to demonstrate improved perception of his abilities. ongoing  Patient will be able to tolerate 5 minutes of standing balance without AD in order to improve function at home. Progressing, ~2 min  Patient will be able to ambulate at least 300' with LRAD or no AD at [facility] in order to return to job at [employer]. MET 4/25/24  Patient will be able to improve R knee flexion to at least 110 degrees in order to demonstrate normal ROM in this extremity. Progressing  Pt will improve 5x chair rise score to </= 18s without UE assist for improved muscular endurance. Ongoing  Pt to perform 2 minute walk test for 350 feet or greater to improve gait speed & endurance. Ongoing  Pt will improve postural control with MCTSIB condition 1 score of  at least 30s for decreased fall risk with standing ADLs. Added 5/21/24  Pt will improve postural control with MCTSIB condition 2 score of at least 12s for decreased fall risk with standing ADLs. Added 5/21/24      Plan     Continue to work on ambulation and stair negotiation as well as ROM of the R LE to the patient's tolerance. Focus more on standing balance and glute strength.     Laly Fagan, PT   5/30/2024

## 2024-06-10 NOTE — PROGRESS NOTES
[facility]   Physical Therapy Treatment Note     Name: Kenji Malone  Clinic Number: [id number]    Therapy Diagnosis:   Encounter Diagnoses   Name Primary?    Impaired functional mobility, balance, gait, and endurance Yes    Decreased strength involving knee joint     Impaired weight bearing          Physician: Order, Paper    Visit Date: 6/11/2024    Physician who did surgery: Mauricio Jaimes MD     Physician Orders: PT Eval and Treat   Medical Diagnosis from Referral: Postoperative state  Evaluation Date: 2/1/2024  Authorization Period Expiration: 12/31/2024  Plan of Care Expiration: 8/13/2024  Progress Note Due: 6/21/2024  Visit # / Visits authorized: 29/ 40(+eval)  FOTO: 2/3    PTA Visit #: 0/5     Time In: 14:33  Time Out: 15:15  Total Billable Time: 42 minutes (TE only Medicaid)    Subjective     Pt reports: he's walking more and further in the home without the assistive device.      He was compliant with home exercise program.  Response to previous treatment: good  Functional change: ongoing    Pain: 0/10  Location: none reported     OBJECTIVE     Objective Measures updated at progress report unless specified.       Treatment     Kenji received the treatments listed below:      therapeutic exercises to develop strength, endurance, ROM, flexibility, posture, and core stabilization for 42 minutes including:    Pt doffs knee brace for the following exercises:   3 x 10 reps supine R LE SLR~ 1# ankle weight added  3 x 10 reps supine glute bridges  3 x 10 reps prone R LE hip extension  3 x 10 reps of prone R HS curls ~ 3# ankle weight added  3 x 10 reps R LE LAQ with 3 sec hold~ 5# ankle weight added  3 x 10 reps seated R LE HS curls with creeper and GTB ~ cues to keep foot flat to encourage more hamstring     2 x 10 trials of sit <> stand from mat without arm push-off, with R knee brace unlocked, SBA-CGA   3 x 30 sec NBOS stance hold, touchdown assist + SBA, with knee brace unlocked    "  Pt ambulated with no A.D with R knee brace unlocked and PT CGA for x2 trials. VC's for arm swing.  Trial 1.) 224 ft, 2.) 216 ft    Home Exercises Provided and Patient Education Provided     Education provided:   - home exercise program  -2/16/24: PT provided new exercise for home( SLR). PT also recommended that pt " unlock" his brace at all times( to encourage improved R knee flexion) and encouraged him to ambulate more at home with his RW. Education also provided to pt and mother regarding appropriate brace position.  -3/14/24: PT provided considerable education regarding pt's need to transition out of his current knee brace. PT even pointed to his ortho visit to [facility] on -1/25/24~ recommendation at that time was for pt to begin removing his brace. However, pt continues to wear his hinged knee brace(even during sleep). PT recommended pt work on sit<> stand trials at home without use of brace and to begin sleeping without brace.  -5/30/24: need to loosen brace if it is causing skin breakdown and to allow for some room at each strap; reinforcement of edu re: how to properly don brace      Written Home Exercises Provided: Patient instructed to cont prior HEP.    See EMR under Patient Instructions for exercises provided 2/6/2024; 2/16/24    Assessment     Joshua demonstrates steady improvement with overall gait endurance and mechanics, requiring less upper extremity support and less imbalance noted as well. He is still limited by glute weakness, though progressing appropriately as he had more consistent STS technique and minimal need for stabilizing with his arms. Plan to re-assess next week to monitor range of motion progression.      Kenji Is progressing well towards his goals.   Pt prognosis is Good.     Pt will continue to benefit from skilled outpatient physical therapy to address the deficits listed in the problem list box on initial evaluation, provide pt/family education and to maximize pt's level of " independence in the home and community environment.     Pt's spiritual, cultural and educational needs considered and pt agreeable to plan of care and goals.     Anticipated barriers to physical therapy: co-morbidities    Goals:  Short Term Goals: 4 weeks   Patient will be independent with home exercise program. MET 3/28/2024  Patient will be able to tolerate standing for at least 5 minutes with LRAD in order to improve function at home. MET 3/28/2024 (with RW)  Patient will be able to ambulate at least 50 feet with LRAD and CGA only in order to improve his independence at home. MET 3/28/2024  Patient will improve R quad strength to at least 4-/5 in order to improve his ability to tolerate standing. MET 3/28/2024  Patient will be able to complete TUG, , MCTSIB, and  testing and set appropriate long term goals. MET 5/21/2024     Long Term Goals: 8 weeks   Patient will improve FOTO limitation to at least 39% in order to demonstrate improved perception of his abilities. ongoing  Patient will be able to tolerate 5 minutes of standing balance without AD in order to improve function at home. Progressing, ~2 min  Patient will be able to ambulate at least 300' with LRAD or no AD at [facility] in order to return to job at [employer]. MET 4/25/24  Patient will be able to improve R knee flexion to at least 110 degrees in order to demonstrate normal ROM in this extremity. Progressing  Pt will improve 5x chair rise score to </= 18s without UE assist for improved muscular endurance. Ongoing  Pt to perform 2 minute walk test for 350 feet or greater to improve gait speed & endurance. Ongoing  Pt will improve postural control with MCTSIB condition 1 score of at least 30s for decreased fall risk with standing ADLs. Added 5/21/24  Pt will improve postural control with MCTSIB condition 2 score of at least 12s for decreased fall risk with standing ADLs. Added 5/21/24      Plan     Continue to work on ambulation and stair  negotiation as well as ROM of the R LE to the patient's tolerance. Focus more on standing balance and glute strength.     Laly Fagan, PT   6/11/2024

## 2024-06-11 ENCOUNTER — CLINICAL SUPPORT (OUTPATIENT)
Dept: REHABILITATION | Facility: HOSPITAL | Age: 19
End: 2024-06-11
Payer: MEDICAID

## 2024-06-11 DIAGNOSIS — Z74.09 IMPAIRED FUNCTIONAL MOBILITY, BALANCE, GAIT, AND ENDURANCE: Primary | ICD-10-CM

## 2024-06-11 DIAGNOSIS — R26.89 IMPAIRED WEIGHT BEARING: ICD-10-CM

## 2024-06-11 DIAGNOSIS — R29.898 DECREASED STRENGTH INVOLVING KNEE JOINT: ICD-10-CM

## 2024-06-11 PROCEDURE — 97110 THERAPEUTIC EXERCISES: CPT | Mod: PO

## 2024-06-17 ENCOUNTER — DOCUMENTATION ONLY (OUTPATIENT)
Dept: REHABILITATION | Facility: HOSPITAL | Age: 19
End: 2024-06-17
Payer: MEDICAID

## 2024-06-17 NOTE — PROGRESS NOTES
PT/PTA met face to face to discuss pt's treatment plan and progress towards established goals.  Continue with current PT POC with focus on strengthening, range of motion, and gait.  Patient will be seen by physical therapist at least every sixth treatment or 30 days, whichever occurs first.    Marian Mercado, PTA  06/17/2024

## 2024-06-18 ENCOUNTER — CLINICAL SUPPORT (OUTPATIENT)
Dept: REHABILITATION | Facility: HOSPITAL | Age: 19
End: 2024-06-18
Payer: MEDICAID

## 2024-06-18 DIAGNOSIS — Z74.09 IMPAIRED FUNCTIONAL MOBILITY, BALANCE, GAIT, AND ENDURANCE: Primary | ICD-10-CM

## 2024-06-18 DIAGNOSIS — R29.898 DECREASED STRENGTH INVOLVING KNEE JOINT: ICD-10-CM

## 2024-06-18 DIAGNOSIS — R26.89 IMPAIRED WEIGHT BEARING: ICD-10-CM

## 2024-06-18 PROCEDURE — 97110 THERAPEUTIC EXERCISES: CPT | Mod: PO

## 2024-06-18 NOTE — PROGRESS NOTES
[facility]PAT[facility] THERAPY AND [facility]LL[facility]   Physical Therapy Treatment Note / Re-Assessment Note    Name: Kenji Malone  Clinic Number: [id number]    Therapy Diagnosis:   Encounter Diagnoses   Name Primary?    Impaired functional mobility, balance, gait, and endurance Yes    Decreased strength involving knee joint     Impaired weight bearing          Physician: Order, Paper    Visit Date: 6/18/2024    Physician who did surgery: Mauricio Jaimes MD     Physician Orders: PT Eval and Treat   Medical Diagnosis from Referral: Postoperative state  Evaluation Date: 2/1/2024  Authorization Period Expiration: 12/31/2024  Plan of Care Expiration: 8/13/2024  Progress Note Due: 7/18/2024  Visit # / Visits authorized: 30/ 40(+eval)  FOTO: 2/3    PTA Visit #: 0/5     Time In: 16:11  Time Out: 16:57  Total Billable Time: 46 minutes (TE only Medicaid)    Subjective     Pt reports: he's still nervous to walk around the house without the walker. He's afraid he will fall.     He was compliant with home exercise program.  Response to previous treatment: good  Functional change: ongoing    Pain: 0/10  Location: none reported     OBJECTIVE     Objective Measures updated at progress report unless specified.     Measurements taken with brace off.   RLE ROM:  Knee flexion = 103 deg  Knee extension = 0 deg     LLE ROM:  Knee flexion = 130 deg  Knee extension = lacking 8 deg even with overpressure     Lower Extremity Strength~ tested in sitting  Right LE 10/24/23 previous POC Eval 2/6/2024 4/24/24 5/21/24 6/18/2024 Left LE 10/24/23  Previous POC Eval 2/6/2024    2/9/24 3/28/24 4/24/24 5/21/24 6/18/2024   Hip flexion:  5/5 5/5 5/5 5/5 5/5 Hip flexion: 5/5 5/5 5/5 5/5 5/5 5/5 5/5   Knee extension: 5/5 3/5 4+/5 4+/5 4+/5 Knee extension: 5/5 5/5 5/5 5/5 5/5 5/5 5/5   Knee flexion: 4+/5 4-/5* 4/5 4/5 4+/5 Knee flexion: 3/5 3/5 3+/5 4-/5 4-/5 4-/5 4-/5   Ankle dorsiflexion:  1+/5 1/5 2/5 2+/5 2-/5 Ankle dorsiflexion: trace trace/5 1/5 1/5 1/5 1/5  1/5   Ankle plantarflexion:  1/5 1/5 1/5 2-/5 2-/5 Ankle plantarflexion: trace trace/5 1/5 1/5 1/5 1/5 1/5   Hip abduction: 3+/5 to 4-/5* 3+/5 3+/5 3+/5 4-/5 Hip abduction: 3+/5* 3+/5 3+/5 4-/5 4/5 4/5 4/5   Hip adduction: 5/5 5/5 5/5 5/5 5/5 Hip adduction 5/5 5/5 5/5 5/5 5/5 5/5 5/5   Hip extension: 4/5 3+/5 4/5 4/5  Hip extension: 5/5 5/5 4-/5 4/5 4+/5 4+/5    *within range of motion     5x STS: 14 seconds with arms, 22 seconds without arms (hinged knee brace donned)     Gait assessments done with hinged knee brace ON     Self-Selected Gait Speed:   Trial 1: 6 meters in 8 seconds, 0.75 m/s with RW  Trial 2: 6 meters in 8 seconds, 0.75 m/s without RW     2-Minute Walk Test  AD used: none, PT CGA  Distance: 270 ft     MCTSIB:  Condition 1 = 12 sec  Condition 2 = 4 sec  Condition 3 = 5 sec  Condition 4 = 1 sec    Treatment     Kenji received the treatments listed below:      therapeutic exercises to develop strength, endurance, ROM, flexibility, posture, and core stabilization for 46 minutes including:    Pt doffs knee brace for the following exercises:   2 x 10 reps prone B LE hip extension  2 x 10 reps of prone B HS curls ~ 3# on R and 1.5# on L  3 x 10 reps supine glute bridges    Not performed today:   3 x 10 reps supine B LE SLR~ 1# ankle weight added  3 x 10 reps R LE LAQ with 3 sec hold~ 5# ankle weight added  3 x 10 reps seated B LE HS curls with creeper and GTB ~ cues to keep foot flat to encourage more hamstring   2 x 10 trials of sit <> stand from mat without arm push-off, with R knee brace unlocked, SBA-CGA   3 x 30 sec NBOS stance hold, touchdown assist + SBA, with knee brace unlocked       Safe falling practice and fall recovery practice:   - sit EOM <> floor via scoot t/f mod I   - standing up from ground via inchworm technique (prone > down dog > walk hands up > stand up)   - falling backwards onto his buttocks safely several times with PT CGA     Home Exercises Provided and Patient Education  "Provided     Education provided:   - home exercise program  -2/16/24: PT provided new exercise for home( SLR). PT also recommended that pt " unlock" his brace at all times( to encourage improved R knee flexion) and encouraged him to ambulate more at home with his RW. Education also provided to pt and mother regarding appropriate brace position.  -3/14/24: PT provided considerable education regarding pt's need to transition out of his current knee brace. PT even pointed to his ortho visit to [facility] on -1/25/24~ recommendation at that time was for pt to begin removing his brace. However, pt continues to wear his hinged knee brace(even during sleep). PT recommended pt work on sit<> stand trials at home without use of brace and to begin sleeping without brace.  -5/30/24: need to loosen brace if it is causing skin breakdown and to allow for some room at each strap; reinforcement of edu re: how to properly don brace      Written Home Exercises Provided: Patient instructed to cont prior HEP.    See EMR under Patient Instructions for exercises provided 2/6/2024; 2/16/24    Assessment     Joshua is making steady progress and is improving with right lower extremity strength as well as gait. He was able to achieve about the same distance during the 2MWT today without use of an assistive device as compared to his last re-assessment using a rolling walker. In addition, his SSWS is the same both with or without the walker. PT suggests that patient wean off of rolling walker and to ambulate unsupported within the home more to further return to prior level of function. He remains limited by standing balance and glute/hamstring strength but overall making good gains.     Kenji Is progressing well towards his goals.   Pt prognosis is Good.     Pt will continue to benefit from skilled outpatient physical therapy to address the deficits listed in the problem list box on initial evaluation, provide pt/family education and to maximize pt's " level of independence in the home and community environment.     Pt's spiritual, cultural and educational needs considered and pt agreeable to plan of care and goals.     Anticipated barriers to physical therapy: co-morbidities    Goals:  Short Term Goals: 4 weeks   Patient will be independent with home exercise program. MET 3/28/2024  Patient will be able to tolerate standing for at least 5 minutes with LRAD in order to improve function at home. MET 3/28/2024 (with RW)  Patient will be able to ambulate at least 50 feet with LRAD and CGA only in order to improve his independence at home. MET 3/28/2024  Patient will improve R quad strength to at least 4-/5 in order to improve his ability to tolerate standing. MET 3/28/2024  Patient will be able to complete TUG, , MCTSIB, and  testing and set appropriate long term goals. MET 5/21/2024     Long Term Goals: 8 weeks   Patient will improve FOTO limitation to at least 39% in order to demonstrate improved perception of his abilities. ongoing  Patient will be able to tolerate 5 minutes of standing balance without AD in order to improve function at home. Progressing, ~2 min  Patient will be able to ambulate at least 300' with LRAD or no AD at [facility] in order to return to job at [employer]. MET 4/25/24  Patient will be able to improve R knee flexion to at least 110 degrees in order to demonstrate normal ROM in this extremity. Progressing  Pt will improve 5x chair rise score to </= 18s without UE assist for improved muscular endurance. Ongoing  Pt to perform 2 minute walk test for 350 feet or greater to improve gait speed & endurance. Ongoing  Pt will improve postural control with MCTSIB condition 1 score of at least 30s for decreased fall risk with standing ADLs. Added 5/21/24  Pt will improve postural control with MCTSIB condition 2 score of at least 12s for decreased fall risk with standing ADLs. Added 5/21/24      Plan     Continue to work on ambulation and stair  negotiation as well as ROM of the R LE to the patient's tolerance. Focus more on standing balance and glute strength.     Laly Fagan, PT   6/18/2024

## 2024-06-25 ENCOUNTER — CLINICAL SUPPORT (OUTPATIENT)
Dept: REHABILITATION | Facility: HOSPITAL | Age: 19
End: 2024-06-25
Payer: MEDICAID

## 2024-06-25 DIAGNOSIS — R29.898 DECREASED STRENGTH INVOLVING KNEE JOINT: ICD-10-CM

## 2024-06-25 DIAGNOSIS — Z74.09 IMPAIRED FUNCTIONAL MOBILITY, BALANCE, GAIT, AND ENDURANCE: Primary | ICD-10-CM

## 2024-06-25 DIAGNOSIS — R26.89 IMPAIRED WEIGHT BEARING: ICD-10-CM

## 2024-06-25 PROCEDURE — 97110 THERAPEUTIC EXERCISES: CPT | Mod: PO,CQ

## 2024-06-25 NOTE — PROGRESS NOTES
"[facility]PAT[facility]ER[facility]   Physical Therapy Treatment Note     Name: Kenji Malone  Clinic Number: [id number]    Therapy Diagnosis:   Encounter Diagnoses   Name Primary?    Impaired functional mobility, balance, gait, and endurance Yes    Decreased strength involving knee joint     Impaired weight bearing          Physician: Order, Paper    Visit Date: 6/25/2024    Physician who did surgery: Mauricio Jaimes MD     Physician Orders: PT Eval and Treat   Medical Diagnosis from Referral: Postoperative state  Evaluation Date: 2/1/2024  Authorization Period Expiration: 12/31/2024  Plan of Care Expiration: 8/13/2024  Progress Note Due: 7/18/2024  Visit # / Visits authorized: 31/ 40(+eval)  FOTO: 2/3    PTA Visit #: 1/5     Time In: 1430  Time Out: 1515  Total Billable Time: 45 minutes (TE only Medicaid)    Subjective     Pt reports: " I'm doing pretty good."     He was compliant with home exercise program.  Response to previous treatment: good  Functional change: ongoing    Pain: 0/10  Location: none reported     OBJECTIVE     Objective Measures updated at progress report unless specified.       Treatment     Kenji received the treatments listed below:      therapeutic exercises to develop strength, endurance, ROM, flexibility, posture, and core stabilization for 46 minutes including:    Pt doffs knee brace for the following exercises:   2 x 10 reps prone B LE hip extension  2 x 10 reps of prone B HS curls ~ 3# on R and 1.5# on L  3 x 10 reps supine glute bridges    3 x 10 reps supine B LE SLR~ 1# ankle weight added  3 x 10 reps R LE LAQ with 3 sec hold~ 5# ankle weight added  3 x 10 reps seated B LE HS curls with creeper and GTB ~ cues to keep foot flat to encourage more hamstring   2 x 10 trials of sit <> stand from mat without arm push-off, with R knee brace unlocked, SBA-CGA          Gait:   Pt ambulated ~ 337ft with no A.D and CGA.      Home Exercises Provided and Patient Education Provided " "    Education provided:   - home exercise program  -2/16/24: PT provided new exercise for home( SLR). PT also recommended that pt " unlock" his brace at all times( to encourage improved R knee flexion) and encouraged him to ambulate more at home with his RW. Education also provided to pt and mother regarding appropriate brace position.  -3/14/24: PT provided considerable education regarding pt's need to transition out of his current knee brace. PT even pointed to his ortho visit to [facility] on -1/25/24~ recommendation at that time was for pt to begin removing his brace. However, pt continues to wear his hinged knee brace(even during sleep). PT recommended pt work on sit<> stand trials at home without use of brace and to begin sleeping without brace.  -5/30/24: need to loosen brace if it is causing skin breakdown and to allow for some room at each strap; reinforcement of edu re: how to properly don brace      Written Home Exercises Provided: Patient instructed to cont prior HEP.    See EMR under Patient Instructions for exercises provided 2/6/2024; 2/16/24    Assessment     Joshua tolerated his tx session well and did not have any complaints.  Joshua was able to increase his gait distance today with no A.D.      Kenji Is progressing well towards his goals.   Pt prognosis is Good.     Pt will continue to benefit from skilled outpatient physical therapy to address the deficits listed in the problem list box on initial evaluation, provide pt/family education and to maximize pt's level of independence in the home and community environment.     Pt's spiritual, cultural and educational needs considered and pt agreeable to plan of care and goals.     Anticipated barriers to physical therapy: co-morbidities    Goals:  Short Term Goals: 4 weeks   Patient will be independent with home exercise program. MET 3/28/2024  Patient will be able to tolerate standing for at least 5 minutes with LRAD in order to improve function at home. MET " 3/28/2024 (with RW)  Patient will be able to ambulate at least 50 feet with LRAD and CGA only in order to improve his independence at home. MET 3/28/2024  Patient will improve R quad strength to at least 4-/5 in order to improve his ability to tolerate standing. MET 3/28/2024  Patient will be able to complete TUG, , MCTSIB, and  testing and set appropriate long term goals. MET 5/21/2024     Long Term Goals: 8 weeks   Patient will improve FOTO limitation to at least 39% in order to demonstrate improved perception of his abilities. ongoing  Patient will be able to tolerate 5 minutes of standing balance without AD in order to improve function at home. Progressing, ~2 min  Patient will be able to ambulate at least 300' with LRAD or no AD at [facility] in order to return to job at [employer]. MET 4/25/24  Patient will be able to improve R knee flexion to at least 110 degrees in order to demonstrate normal ROM in this extremity. Progressing  Pt will improve 5x chair rise score to </= 18s without UE assist for improved muscular endurance. Ongoing  Pt to perform 2 minute walk test for 350 feet or greater to improve gait speed & endurance. Ongoing  Pt will improve postural control with MCTSIB condition 1 score of at least 30s for decreased fall risk with standing ADLs. Added 5/21/24  Pt will improve postural control with MCTSIB condition 2 score of at least 12s for decreased fall risk with standing ADLs. Added 5/21/24      Plan     Continue to work on ambulation and stair negotiation as well as ROM of the R LE to the patient's tolerance. Focus more on standing balance and glute strength.     Marian Mercado, PTA   6/25/2024

## 2024-06-27 ENCOUNTER — CLINICAL SUPPORT (OUTPATIENT)
Dept: REHABILITATION | Facility: HOSPITAL | Age: 19
End: 2024-06-27
Payer: MEDICAID

## 2024-06-27 DIAGNOSIS — R26.89 IMPAIRED WEIGHT BEARING: ICD-10-CM

## 2024-06-27 DIAGNOSIS — Z74.09 IMPAIRED FUNCTIONAL MOBILITY, BALANCE, GAIT, AND ENDURANCE: Primary | ICD-10-CM

## 2024-06-27 DIAGNOSIS — R29.898 DECREASED STRENGTH INVOLVING KNEE JOINT: ICD-10-CM

## 2024-06-27 PROCEDURE — 97110 THERAPEUTIC EXERCISES: CPT | Mod: PO

## 2024-06-27 NOTE — PROGRESS NOTES
"[facility]   Physical Therapy Treatment Note     Name: Kenji Malone  Clinic Number: [id number]    Therapy Diagnosis:   Encounter Diagnoses   Name Primary?    Impaired functional mobility, balance, gait, and endurance Yes    Decreased strength involving knee joint     Impaired weight bearing            Physician: Order, Paper    Visit Date: 6/27/2024    Physician who did surgery: Mauricio Jaimes MD     Physician Orders: PT Eval and Treat   Medical Diagnosis from Referral: Postoperative state  Evaluation Date: 2/1/2024  Authorization Period Expiration: 12/31/2024  Plan of Care Expiration: 8/13/2024  Progress Note Due: 6/21/2024  Visit # / Visits authorized: 32/ 40(+eval)  FOTO: 2/3    PTA Visit #: 0/5     Time In: 1538  Time Out: 1622  Total Billable Time: 44 minutes (TE only Medicaid)    Subjective     Pt reports: "I'm going to hit the bathroom and I'll meet you in there."     He was compliant with home exercise program.  Response to previous treatment: good  Functional change: ongoing    Pain: 0/10  Location: N/A    OBJECTIVE     Objective Measures updated at progress report unless specified.       Treatment     Kenji received the treatments listed below:      therapeutic exercises to develop strength, endurance, ROM, flexibility, posture, and core stabilization for 44 minutes including:    Pt doffs knee brace for the following exercises:   3 x 10 reps supine R LE SLR~ 1# ankle weight added  3 x 10 reps supine glute bridges  3 x 10 reps prone R LE hip extension  3 x 10 reps of prone R HS curls ~ 3# ankle weight added  3 x 10 reps R LE LAQ with 3 sec hold~ 5# ankle weight added  3 x 10 reps seated R LE HS curls with creeper and GTB ~ cues to keep foot flat to encourage more hamstring     3 x 10 trials of sit <> stand from mat without arm push-off, with R knee brace unlocked, CGA to occasional slight min A  3 x 30 sec NBOS stance hold, no UE support + CGA, with R knee brace unlocked   " "  Pt ambulated with no A.D with R knee brace unlocked and PT CGA for x 2 trials. VC's for arm swing.  Each trial ~ 240 feet each    Stairs: Pt ascends and descends 12 six inch steps with 1 rail support, CGA( pt performs mostly non-reciprocal pattern, occasionally attempts reciprocal)    X 2.5 minutes of standing and bouncing/tossing orange physioball back and forth with PT, CGA/min A    Home Exercises Provided and Patient Education Provided     Education provided:   - home exercise program  -2/16/24: PT provided new exercise for home( SLR). PT also recommended that pt " unlock" his brace at all times( to encourage improved R knee flexion) and encouraged him to ambulate more at home with his RW. Education also provided to pt and mother regarding appropriate brace position.  -3/14/24: PT provided considerable education regarding pt's need to transition out of his current knee brace. PT even pointed to his ortho visit to [facility] on -1/25/24~ recommendation at that time was for pt to begin removing his brace. However, pt continues to wear his hinged knee brace(even during sleep). PT recommended pt work on sit<> stand trials at home without use of brace and to begin sleeping without brace.  -5/30/24: need to loosen brace if it is causing skin breakdown and to allow for some room at each strap; reinforcement of edu re: how to properly don brace  -6/27/24: PT provided a schedule slip for the rest of July.      Written Home Exercises Provided: Patient instructed to cont prior HEP.    See EMR under Patient Instructions for exercises provided 2/6/2024; 2/16/24    Assessment     Joshua tolerated today's session well and continues to make gradual progress with his ability to perform sit<> stand transitions, ambulate and climb stairs. His mother mentioned today at the end of the session that pt has been given the clearance by his MD to perform all activities without use of R knee brace( though he is still to wear at night). Joshua did " demonstrate mild instability during sit<> stand trials and during ball bounce/toss, occasionally having to use a stepping strategy to maintain his balance. He remains appropriate for continued therapy to help ready him for his next ostomy procedure, perhaps this fall.      Kenji Is progressing well towards his goals.   Pt prognosis is Good.     Pt will continue to benefit from skilled outpatient physical therapy to address the deficits listed in the problem list box on initial evaluation, provide pt/family education and to maximize pt's level of independence in the home and community environment.     Pt's spiritual, cultural and educational needs considered and pt agreeable to plan of care and goals.     Anticipated barriers to physical therapy: co-morbidities    Goals:  Short Term Goals: 4 weeks   Patient will be independent with home exercise program. MET 3/28/2024  Patient will be able to tolerate standing for at least 5 minutes with LRAD in order to improve function at home. MET 3/28/2024 (with RW)  Patient will be able to ambulate at least 50 feet with LRAD and CGA only in order to improve his independence at home. MET 3/28/2024  Patient will improve R quad strength to at least 4-/5 in order to improve his ability to tolerate standing. MET 3/28/2024  Patient will be able to complete TUG, , MCTSIB, and  testing and set appropriate long term goals. MET 5/21/2024     Long Term Goals: 8 weeks   Patient will improve FOTO limitation to at least 39% in order to demonstrate improved perception of his abilities. ongoing  Patient will be able to tolerate 5 minutes of standing balance without AD in order to improve function at home. Progressing, ~2 min  Patient will be able to ambulate at least 300' with LRAD or no AD at [facility] in order to return to job at [employer]. MET 4/25/24  Patient will be able to improve R knee flexion to at least 110 degrees in order to demonstrate normal ROM in this extremity.  Progressing  Pt will improve 5x chair rise score to </= 18s without UE assist for improved muscular endurance. Ongoing  Pt to perform 2 minute walk test for 350 feet or greater to improve gait speed & endurance. Ongoing  Pt will improve postural control with MCTSIB condition 1 score of at least 30s for decreased fall risk with standing ADLs. Added 5/21/24  Pt will improve postural control with MCTSIB condition 2 score of at least 12s for decreased fall risk with standing ADLs. Added 5/21/24      Plan     Continue to work on ambulation and stair negotiation as well as ROM of the R LE to the patient's tolerance. Focus more on standing balance and glute strength.     David Frankel, PT   6/27/2024

## 2024-06-27 NOTE — PROGRESS NOTES
"[facility]   Physical Therapy Treatment Note     Name: Kenji Malone  Clinic Number: [id number]    Therapy Diagnosis:   No diagnosis found.      Physician: Order, Paper    Visit Date: 6/27/2024    Physician who did surgery: Mauricio Jaimes MD     Physician Orders: PT Eval and Treat   Medical Diagnosis from Referral: Postoperative state  Evaluation Date: 2/1/2024  Authorization Period Expiration: 12/31/2024  Plan of Care Expiration: 8/13/2024  Progress Note Due: 7/18/2024  Visit # / Visits authorized: 32/ 40(+eval)  FOTO: 2/3    PTA Visit #: 1/5     Time In: 15:***  Time Out: 16:***  Total Billable Time: *** minutes (TE only Medicaid)    Subjective     Pt reports: ***     He was compliant with home exercise program.  Response to previous treatment: good  Functional change: ongoing    Pain: 0/10  Location: none reported     OBJECTIVE     Objective Measures updated at progress report unless specified.       Treatment     Kenji received the treatments listed below:      therapeutic exercises to develop strength, endurance, ROM, flexibility, posture, and core stabilization for *** minutes including:    Pt doffs knee brace for the following exercises:   2 x 10 reps prone B LE hip extension  2 x 10 reps of prone B HS curls ~ 3# on R and 1.5# on L  3 x 10 reps supine glute bridges  3 x 10 reps supine B LE SLR~ 1# ankle weight added  3 x 10 reps R LE LAQ with 3 sec hold~ 5# ankle weight added  3 x 10 reps seated B LE HS curls with creeper and GTB ~ cues to keep foot flat to encourage more hamstring     2 x 10 trials of sit <> stand from mat without arm push-off, with R knee brace unlocked, SBA-CGA     Gait training:  ***       Home Exercises Provided and Patient Education Provided     Education provided:   - home exercise program  -2/16/24: PT provided new exercise for home( SLR). PT also recommended that pt " unlock" his brace at all times( to encourage improved R knee flexion) and encouraged " him to ambulate more at home with his RW. Education also provided to pt and mother regarding appropriate brace position.  -3/14/24: PT provided considerable education regarding pt's need to transition out of his current knee brace. PT even pointed to his ortho visit to [facility] on -1/25/24~ recommendation at that time was for pt to begin removing his brace. However, pt continues to wear his hinged knee brace(even during sleep). PT recommended pt work on sit<> stand trials at home without use of brace and to begin sleeping without brace.  -5/30/24: need to loosen brace if it is causing skin breakdown and to allow for some room at each strap; reinforcement of edu re: how to properly don brace      Written Home Exercises Provided: Patient instructed to cont prior HEP.    See EMR under Patient Instructions for exercises provided 2/6/2024; 2/16/24    Assessment     Joshua ***      Kenji Is progressing well towards his goals.   Pt prognosis is Good.     Pt will continue to benefit from skilled outpatient physical therapy to address the deficits listed in the problem list box on initial evaluation, provide pt/family education and to maximize pt's level of independence in the home and community environment.     Pt's spiritual, cultural and educational needs considered and pt agreeable to plan of care and goals.     Anticipated barriers to physical therapy: co-morbidities    Goals:  Short Term Goals: 4 weeks   Patient will be independent with home exercise program. MET 3/28/2024  Patient will be able to tolerate standing for at least 5 minutes with LRAD in order to improve function at home. MET 3/28/2024 (with RW)  Patient will be able to ambulate at least 50 feet with LRAD and CGA only in order to improve his independence at home. MET 3/28/2024  Patient will improve R quad strength to at least 4-/5 in order to improve his ability to tolerate standing. MET 3/28/2024  Patient will be able to complete TUG, , MCTSIB, and  testing  and set appropriate long term goals. MET 5/21/2024     Long Term Goals: 8 weeks   Patient will improve FOTO limitation to at least 39% in order to demonstrate improved perception of his abilities. ongoing  Patient will be able to tolerate 5 minutes of standing balance without AD in order to improve function at home. Progressing, ~2 min  Patient will be able to ambulate at least 300' with LRAD or no AD at [facility] in order to return to job at [employer]. MET 4/25/24  Patient will be able to improve R knee flexion to at least 110 degrees in order to demonstrate normal ROM in this extremity. Progressing  Pt will improve 5x chair rise score to </= 18s without UE assist for improved muscular endurance. Ongoing  Pt to perform 2 minute walk test for 350 feet or greater to improve gait speed & endurance. Ongoing  Pt will improve postural control with MCTSIB condition 1 score of at least 30s for decreased fall risk with standing ADLs. Added 5/21/24  Pt will improve postural control with MCTSIB condition 2 score of at least 12s for decreased fall risk with standing ADLs. Added 5/21/24      Plan     Continue to work on ambulation and stair negotiation as well as ROM of the R LE to the patient's tolerance. Focus more on standing balance and glute strength.     Laly Fagan, PT   6/27/2024

## 2024-07-02 ENCOUNTER — CLINICAL SUPPORT (OUTPATIENT)
Dept: REHABILITATION | Facility: HOSPITAL | Age: 19
End: 2024-07-02
Payer: MEDICAID

## 2024-07-02 DIAGNOSIS — R29.898 DECREASED STRENGTH INVOLVING KNEE JOINT: ICD-10-CM

## 2024-07-02 DIAGNOSIS — Z74.09 IMPAIRED FUNCTIONAL MOBILITY, BALANCE, GAIT, AND ENDURANCE: Primary | ICD-10-CM

## 2024-07-02 DIAGNOSIS — R26.89 IMPAIRED WEIGHT BEARING: ICD-10-CM

## 2024-07-02 PROCEDURE — 97110 THERAPEUTIC EXERCISES: CPT | Mod: PO,CQ

## 2024-07-02 NOTE — PROGRESS NOTES
"[facility]   Physical Therapy Treatment Note     Name: Kenji Malone  Clinic Number: [id number]    Therapy Diagnosis:   Encounter Diagnoses   Name Primary?    Impaired functional mobility, balance, gait, and endurance Yes    Decreased strength involving knee joint     Impaired weight bearing            Physician: Order, Paper    Visit Date: 7/2/2024    Physician who did surgery: Mauricio Jaimes MD     Physician Orders: PT Eval and Treat   Medical Diagnosis from Referral: Postoperative state  Evaluation Date: 2/1/2024  Authorization Period Expiration: 12/31/2024  Plan of Care Expiration: 8/13/2024  Progress Note Due: 6/21/2024  Visit # / Visits authorized: 33/ 40(+eval)  FOTO: 2/3    PTA Visit #: 1/5     Time In: 1430  Time Out: 1515  Total Billable Time: 45 minutes (TE only Medicaid)    Subjective     Pt reports: " How was your weekend ?"    He was compliant with home exercise program.  Response to previous treatment: good  Functional change: ongoing    Pain: 0/10  Location: N/A    OBJECTIVE     Objective Measures updated at progress report unless specified.       Treatment     Kenji received the treatments listed below:      therapeutic exercises to develop strength, endurance, ROM, flexibility, posture, and core stabilization for 44 minutes including:    Pt doffs knee brace for the following exercises:   3 x 10 reps supine R LE SLR~ 1# ankle weight added  3 x 10 reps supine glute bridges  3 x 10 reps prone R LE hip extension  3 x 10 reps of prone R HS curls ~ 3# ankle weight added  3 x 10 reps R LE LAQ with 3 sec hold~ 5# ankle weight added  3 x 10 reps seated R LE HS curls with creeper and GTB ~ cues to keep foot flat to encourage more hamstring     3 x 10 trials of sit <> stand from mat without arm push-off, with R knee brace unlocked, CGA to occasional slight min A  3 x 30 sec NBOS stance hold, no UE support + CGA, with R knee brace unlocked     Pt ambulated with no A.D with R knee " "brace unlocked and PT CGA ffor 1 trial.  VC's for arm swing.   ~ 440 feet         Home Exercises Provided and Patient Education Provided     Education provided:   - home exercise program  -2/16/24: PT provided new exercise for home( SLR). PT also recommended that pt " unlock" his brace at all times( to encourage improved R knee flexion) and encouraged him to ambulate more at home with his RW. Education also provided to pt and mother regarding appropriate brace position.  -3/14/24: PT provided considerable education regarding pt's need to transition out of his current knee brace. PT even pointed to his ortho visit to [facility] on -1/25/24~ recommendation at that time was for pt to begin removing his brace. However, pt continues to wear his hinged knee brace(even during sleep). PT recommended pt work on sit<> stand trials at home without use of brace and to begin sleeping without brace.  -5/30/24: need to loosen brace if it is causing skin breakdown and to allow for some room at each strap; reinforcement of edu re: how to properly don brace  -6/27/24: PT provided a schedule slip for the rest of July.      Written Home Exercises Provided: Patient instructed to cont prior HEP.    See EMR under Patient Instructions for exercises provided 2/6/2024; 2/16/24    Assessment     Joshua tolerated his tx session well and did not have any problems noted.  Joshua was able to increase his gait distance today with out the A.D.  Cont with plan of care.      Kenji Is progressing well towards his goals.   Pt prognosis is Good.     Pt will continue to benefit from skilled outpatient physical therapy to address the deficits listed in the problem list box on initial evaluation, provide pt/family education and to maximize pt's level of independence in the home and community environment.     Pt's spiritual, cultural and educational needs considered and pt agreeable to plan of care and goals.     Anticipated barriers to physical therapy: " co-morbidities    Goals:  Short Term Goals: 4 weeks   Patient will be independent with home exercise program. MET 3/28/2024  Patient will be able to tolerate standing for at least 5 minutes with LRAD in order to improve function at home. MET 3/28/2024 (with RW)  Patient will be able to ambulate at least 50 feet with LRAD and CGA only in order to improve his independence at home. MET 3/28/2024  Patient will improve R quad strength to at least 4-/5 in order to improve his ability to tolerate standing. MET 3/28/2024  Patient will be able to complete TUG, , MCTSIB, and  testing and set appropriate long term goals. MET 5/21/2024     Long Term Goals: 8 weeks   Patient will improve FOTO limitation to at least 39% in order to demonstrate improved perception of his abilities. ongoing  Patient will be able to tolerate 5 minutes of standing balance without AD in order to improve function at home. Progressing, ~2 min  Patient will be able to ambulate at least 300' with LRAD or no AD at [facility] in order to return to job at [employer]. MET 4/25/24  Patient will be able to improve R knee flexion to at least 110 degrees in order to demonstrate normal ROM in this extremity. Progressing  Pt will improve 5x chair rise score to </= 18s without UE assist for improved muscular endurance. Ongoing  Pt to perform 2 minute walk test for 350 feet or greater to improve gait speed & endurance. Ongoing  Pt will improve postural control with MCTSIB condition 1 score of at least 30s for decreased fall risk with standing ADLs. Added 5/21/24  Pt will improve postural control with MCTSIB condition 2 score of at least 12s for decreased fall risk with standing ADLs. Added 5/21/24      Plan     Continue to work on ambulation and stair negotiation as well as ROM of the R LE to the patient's tolerance. Focus more on standing balance and glute strength.     Marian Mercado, PTA   7/2/2024

## 2024-07-09 ENCOUNTER — CLINICAL SUPPORT (OUTPATIENT)
Dept: REHABILITATION | Facility: HOSPITAL | Age: 19
End: 2024-07-09
Payer: MEDICAID

## 2024-07-09 DIAGNOSIS — R29.898 DECREASED STRENGTH INVOLVING KNEE JOINT: ICD-10-CM

## 2024-07-09 DIAGNOSIS — R26.89 IMPAIRED WEIGHT BEARING: ICD-10-CM

## 2024-07-09 DIAGNOSIS — Z74.09 IMPAIRED FUNCTIONAL MOBILITY, BALANCE, GAIT, AND ENDURANCE: Primary | ICD-10-CM

## 2024-07-09 PROCEDURE — 97110 THERAPEUTIC EXERCISES: CPT | Mod: PO

## 2024-07-09 NOTE — PROGRESS NOTES
"[facility]   Physical Therapy Treatment Note     Name: Kenji Malone  Clinic Number: [id number]    Therapy Diagnosis:   Encounter Diagnoses   Name Primary?    Impaired functional mobility, balance, gait, and endurance Yes    Decreased strength involving knee joint     Impaired weight bearing        Physician: Order, Paper    Visit Date: 7/9/2024    Physician who did surgery: Mauricio Jaimes MD     Physician Orders: PT Eval and Treat   Medical Diagnosis from Referral: Postoperative state  Evaluation Date: 2/1/2024  Authorization Period Expiration: 12/31/2024  Plan of Care Expiration: 8/13/2024  Progress Note Due: 7/18/2024  Visit # / Visits authorized: 34/ 40(+eval)  FOTO: 2/3    PTA Visit #: 0/5     Time In: 14:34  Time Out: 15:16  Total Billable Time: 42 minutes (TE only Medicaid)    Subjective     Pt reports: he's walking more without the walker around the house. He can take his brace off now per MD but wanted to try walking at PT first to feel more comfortable.     He was compliant with home exercise program.  Response to previous treatment: good  Functional change: ongoing    Pain: 0/10  Location: N/A    OBJECTIVE     Objective Measures updated at progress report unless specified.       Treatment     Kenji received the treatments listed below:      therapeutic exercises to develop strength, endurance, ROM, flexibility, posture, and core stabilization for 41 minutes including:    Pt doffs knee brace for all of PT session:     3 x 10 reps supine B LE SLR~ 1# ankle weight added on R side  3 x 10 reps supine glute bridges  3 x 10 reps of prone B HS curls ~ 3# ankle weight added on R side    3 x 10 trials of sit <> stand from mat without arm push-off, SBA to incidental CGA     Standing balance/tolerance:  3 x 30 sec NBOS stance hold, no UE support + SBA  2 x 30 sec B staggered stance hold with 1 foot on 4" step + SBA-CGA  2 x 2 min standing + bouncing yellow ball back <> forth with PT, " "CGA from mother      Gait training: x3 laps (~330 ft total) without an assistive device, PT CGA     Home Exercises Provided and Patient Education Provided     Education provided:   - home exercise program  -2/16/24: PT provided new exercise for home( SLR). PT also recommended that pt " unlock" his brace at all times( to encourage improved R knee flexion) and encouraged him to ambulate more at home with his RW. Education also provided to pt and mother regarding appropriate brace position.  -3/14/24: PT provided considerable education regarding pt's need to transition out of his current knee brace. PT even pointed to his ortho visit to [facility] on -1/25/24~ recommendation at that time was for pt to begin removing his brace. However, pt continues to wear his hinged knee brace(even during sleep). PT recommended pt work on sit<> stand trials at home without use of brace and to begin sleeping without brace.  -5/30/24: need to loosen brace if it is causing skin breakdown and to allow for some room at each strap; reinforcement of edu re: how to properly don brace  -6/27/24: PT provided a schedule slip for the rest of July.      Written Home Exercises Provided: Patient instructed to cont prior HEP.    See EMR under Patient Instructions for exercises provided 2/6/2024; 2/16/24    Assessment     Joshua is making steady progress with standing balance, as he was able to rise to stand without any upper extremity assist and only 2 instances of a mild loss of balance that he was able to correct. Added in staggered stance as well for glute med activation and working on more unilateral weight bearing as well which he responded well to. He continues to increase gait distance/endurance without use of rolling walker and is progressing towards established goals.     Kenji Is progressing well towards his goals.   Pt prognosis is Good.     Pt will continue to benefit from skilled outpatient physical therapy to address the deficits listed in the " problem list box on initial evaluation, provide pt/family education and to maximize pt's level of independence in the home and community environment.     Pt's spiritual, cultural and educational needs considered and pt agreeable to plan of care and goals.     Anticipated barriers to physical therapy: co-morbidities    Goals:  Short Term Goals: 4 weeks   Patient will be independent with home exercise program. MET 3/28/2024  Patient will be able to tolerate standing for at least 5 minutes with LRAD in order to improve function at home. MET 3/28/2024 (with RW)  Patient will be able to ambulate at least 50 feet with LRAD and CGA only in order to improve his independence at home. MET 3/28/2024  Patient will improve R quad strength to at least 4-/5 in order to improve his ability to tolerate standing. MET 3/28/2024  Patient will be able to complete TUG, , MCTSIB, and  testing and set appropriate long term goals. MET 5/21/2024     Long Term Goals: 8 weeks   Patient will improve FOTO limitation to at least 39% in order to demonstrate improved perception of his abilities. ongoing  Patient will be able to tolerate 5 minutes of standing balance without AD in order to improve function at home. Progressing, ~2 min  Patient will be able to ambulate at least 300' with LRAD or no AD at [facility] in order to return to job at [employer]. MET 4/25/24  Patient will be able to improve R knee flexion to at least 110 degrees in order to demonstrate normal ROM in this extremity. Progressing  Pt will improve 5x chair rise score to </= 18s without UE assist for improved muscular endurance. Ongoing  Pt to perform 2 minute walk test for 350 feet or greater to improve gait speed & endurance. Ongoing  Pt will improve postural control with MCTSIB condition 1 score of at least 30s for decreased fall risk with standing ADLs. Added 5/21/24  Pt will improve postural control with MCTSIB condition 2 score of at least 12s for decreased fall risk  with standing ADLs. Added 5/21/24      Plan     Continue to work on ambulation and stair negotiation as well as ROM of the R LE to the patient's tolerance. Focus more on standing balance and glute strength.     Laly Fagan, PT   7/9/2024

## 2024-07-10 ENCOUNTER — DOCUMENTATION ONLY (OUTPATIENT)
Dept: REHABILITATION | Facility: HOSPITAL | Age: 19
End: 2024-07-10
Payer: MEDICAID

## 2024-07-10 NOTE — PROGRESS NOTES
PT/PTA met face to face to discuss pt's treatment plan and progress towards established goals.  Continue with current PT POC with focus on Range of motion, gait and balance.  Patient will be seen by physical therapist at least every sixth treatment or 30 days, whichever occurs first.    Marian Mercado, PTA  07/10/2024

## 2024-07-11 ENCOUNTER — CLINICAL SUPPORT (OUTPATIENT)
Dept: REHABILITATION | Facility: HOSPITAL | Age: 19
End: 2024-07-11
Payer: MEDICAID

## 2024-07-11 DIAGNOSIS — Z74.09 IMPAIRED FUNCTIONAL MOBILITY, BALANCE, GAIT, AND ENDURANCE: Primary | ICD-10-CM

## 2024-07-11 DIAGNOSIS — R29.898 DECREASED STRENGTH INVOLVING KNEE JOINT: ICD-10-CM

## 2024-07-11 DIAGNOSIS — R26.89 IMPAIRED WEIGHT BEARING: ICD-10-CM

## 2024-07-11 PROCEDURE — 97110 THERAPEUTIC EXERCISES: CPT | Mod: PO

## 2024-07-11 NOTE — PROGRESS NOTES
[facility]   Physical Therapy Treatment Note / Re-Assessment Note    Name: Kenji Malone  Clinic Number: [id number]    Therapy Diagnosis:   Encounter Diagnoses   Name Primary?    Impaired functional mobility, balance, gait, and endurance Yes    Decreased strength involving knee joint     Impaired weight bearing          Physician: Order, Paper    Visit Date: 7/11/2024    Physician who did surgery: Mauricio Jaimes MD     Physician Orders: PT Eval and Treat   Medical Diagnosis from Referral: Postoperative state  Evaluation Date: 2/1/2024  Authorization Period Expiration: 12/31/2024  Plan of Care Expiration: 8/13/2024  Progress Note Due: 8/11/2024  Visit # / Visits authorized: 35/40 (+eval)  FOTO: 2/3    PTA Visit #: 0/5     Time In: 15:35  Time Out: 16:17  Total Billable Time: 42 minutes (TE only Medicaid)    Subjective     Pt reports: he's doing well today. No new complaints.     He was compliant with home exercise program.  Response to previous treatment: good  Functional change: ongoing    Pain: 0/10  Location: N/A    OBJECTIVE     Objective Measures updated at progress report unless specified.     Measurements taken with brace off.     RLE ROM:  Knee flexion = 108 deg     Lower Extremity Strength~ tested in sitting  Right LE 10/24/23 previous POC Eval 2/6/2024 6/18/2024 7/11/2024 Left LE 10/24/23  Previous POC Eval 2/6/2024 6/18/2024 7/11/24   Hip flexion:  5/5 5/5 5/5 5/5 Hip flexion: 5/5 5/5 5/5 5/5   Knee extension: 5/5 3/5 4+/5 5/5 Knee extension: 5/5 5/5 5/5 5/5   Knee flexion: 4+/5 4-/5* 4+/5 4+/5 Knee flexion: 3/5 3/5 4-/5 4/5   Ankle dorsiflexion:  1+/5 1/5 2-/5 2-/5 Ankle dorsiflexion: trace trace/5 1/5 1/5   Ankle plantarflexion:  1/5 1/5 2-/5 2-/5 Ankle plantarflexion: trace trace/5 1/5 1/5   Hip abduction: 3+/5 to 4-/5* 3+/5 4-/5 4-/5 Hip abduction: 3+/5* 3+/5 4/5 4/5   Hip adduction: 5/5 5/5 5/5 5/5 Hip adduction 5/5 5/5 5/5 5/5   Hip extension: 4/5 3+/5  NT 4+/5 Hip  "extension: 5/5 5/5 NT  5/5   *within range of motion     5x STS:   14 seconds with arms  15 seconds without arms      Self-Selected Gait Speed:   Trial 1: 6 meters in 8 seconds, 0.75 m/s with RW  Trial 2: 6 meters in 6 seconds, 1.0 m/s without RW     2-Minute Walk Test  AD used: none, PT CGA  Distance: 349 ft     MCTSIB:  Condition 1 = 10 sec  Condition 2 = 2 sec  Condition 3 = 3 sec  Condition 4 = 1 sec    Treatment     Kenji received the treatments listed below:      therapeutic exercises to develop strength, endurance, ROM, flexibility, posture, and core stabilization for 42 minutes including:    Pt doffs knee brace for all of PT session:     3 x 10 reps supine B LE SLR~ 1# ankle weight added on R side  3 x 10 reps supine glute bridges  3 x 10 reps of prone B HS curls ~ 3# ankle weight added on R side     Standing balance/tolerance:  x5 min standing + bouncing yellow ball back <> forth with PT, CGA from mother      Objective measures as above    Home Exercises Provided and Patient Education Provided     Education provided:   - home exercise program  -2/16/24: PT provided new exercise for home( SLR). PT also recommended that pt " unlock" his brace at all times( to encourage improved R knee flexion) and encouraged him to ambulate more at home with his RW. Education also provided to pt and mother regarding appropriate brace position.  -3/14/24: PT provided considerable education regarding pt's need to transition out of his current knee brace. PT even pointed to his ortho visit to [facility] on -1/25/24~ recommendation at that time was for pt to begin removing his brace. However, pt continues to wear his hinged knee brace(even during sleep). PT recommended pt work on sit<> stand trials at home without use of brace and to begin sleeping without brace.  -5/30/24: need to loosen brace if it is causing skin breakdown and to allow for some room at each strap; reinforcement of edu re: how to properly don brace  -6/27/24: PT " provided a schedule slip for the rest of July.      Written Home Exercises Provided: Patient instructed to cont prior HEP.    See EMR under Patient Instructions for exercises provided 2/6/2024; 2/16/24    Assessment     Joshua demonstrates great progress towards goals on today's reassessment. He is now consistently ambulating >200 ft without an assistive device and no loss of balance noted which indicates improved gait mechanics and coordination. However, his standing balance is still limited as he demonstrated several instances of imbalance both with MCTSIB and 5 min ball toss. A greater focus on balance is likely needed for rest of plan of care as strength and range of motion are steadily improving. He remains appropriate for OP neuro PT to further maximize return to prior level of function.      Kenji Is progressing well towards his goals.   Pt prognosis is Good.     Pt will continue to benefit from skilled outpatient physical therapy to address the deficits listed in the problem list box on initial evaluation, provide pt/family education and to maximize pt's level of independence in the home and community environment.     Pt's spiritual, cultural and educational needs considered and pt agreeable to plan of care and goals.     Anticipated barriers to physical therapy: co-morbidities    Goals:  Short Term Goals: 4 weeks   Patient will be independent with home exercise program. MET 3/28/2024  Patient will be able to tolerate standing for at least 5 minutes with LRAD in order to improve function at home. MET 3/28/2024 (with RW)  Patient will be able to ambulate at least 50 feet with LRAD and CGA only in order to improve his independence at home. MET 3/28/2024  Patient will improve R quad strength to at least 4-/5 in order to improve his ability to tolerate standing. MET 3/28/2024  Patient will be able to complete TUG, , MCTSIB, and  testing and set appropriate long term goals. MET 5/21/2024     Long Term Goals: 8  weeks   Patient will improve FOTO limitation to at least 39% in order to demonstrate improved perception of his abilities. ongoing  Patient will be able to tolerate 5 minutes of standing balance without AD in order to improve function at home. Progressing 7/11/24  Patient will be able to ambulate at least 300' with LRAD or no AD at [facility] in order to return to job at [employer]. MET 4/25/24  Patient will be able to improve R knee flexion to at least 110 degrees in order to demonstrate normal ROM in this extremity. Progressing  Pt will improve 5x chair rise score to </= 18s without UE assist for improved muscular endurance. MET 7/11/24  Pt to perform 2 minute walk test for 350 feet or greater to improve gait speed & endurance. 1 ft shy 7/11/24  Pt will improve postural control with MCTSIB condition 1 score of at least 30s for decreased fall risk with standing ADLs. Ongoing  Pt will improve postural control with MCTSIB condition 2 score of at least 12s for decreased fall risk with standing ADLs. Ongoing       Plan     Continue to work on ambulation and stair negotiation as well as ROM of the R LE to the patient's tolerance. Focus more on standing balance and glute strength.     Laly Fagan, PT   7/11/2024

## 2024-07-23 ENCOUNTER — CLINICAL SUPPORT (OUTPATIENT)
Dept: REHABILITATION | Facility: HOSPITAL | Age: 19
End: 2024-07-23
Payer: MEDICAID

## 2024-07-23 DIAGNOSIS — R29.898 DECREASED STRENGTH INVOLVING KNEE JOINT: ICD-10-CM

## 2024-07-23 DIAGNOSIS — R26.89 IMPAIRED WEIGHT BEARING: ICD-10-CM

## 2024-07-23 DIAGNOSIS — Z74.09 IMPAIRED FUNCTIONAL MOBILITY, BALANCE, GAIT, AND ENDURANCE: Primary | ICD-10-CM

## 2024-07-23 PROCEDURE — 97110 THERAPEUTIC EXERCISES: CPT | Mod: PO,CQ

## 2024-07-23 NOTE — PROGRESS NOTES
"[facility]   Physical Therapy Treatment Note     Name: Kenji Malone  Clinic Number: [id number]    Therapy Diagnosis:   Encounter Diagnoses   Name Primary?    Impaired functional mobility, balance, gait, and endurance Yes    Decreased strength involving knee joint     Impaired weight bearing          Physician: Order, Paper    Visit Date: 7/23/2024    Physician who did surgery: Mauricio Jaimes MD     Physician Orders: PT Eval and Treat   Medical Diagnosis from Referral: Postoperative state  Evaluation Date: 2/1/2024  Authorization Period Expiration: 12/31/2024  Plan of Care Expiration: 8/13/2024  Progress Note Due: 8/11/2024  Visit # / Visits authorized: 36/40 (+eval)  FOTO: 2/3    PTA Visit #: 1/5     Time In: 1530  Time Out: 16:15  Total Billable Time: 45 minutes (TE only Medicaid)    Subjective     Pt reports: " I got rid of the brace yesterday, I only sleep in it at night."        He was compliant with home exercise program.  Response to previous treatment: good  Functional change: ongoing    Pain: 0/10  Location: N/A    OBJECTIVE     Objective Measures updated at progress report unless specified.     Measurements taken with brace off.       Treatment     Kenji received the treatments listed below:      therapeutic exercises to develop strength, endurance, ROM, flexibility, posture, and core stabilization for 45 minutes including:    Pt doffs knee brace for all of PT session:     3 x 10 reps supine B LE SLR~ 1# ankle weight added on R side  3 x 30 sec of R manual quad stretches for increased knee flexion   3 x 10 reps of prone B HS curls ~ 3# ankle weight added on R side    Sitting EOM:   3 x 10 reps of R LE Hs curls with creeper and GTB     Standing balance/tolerance:  X 4 min of standing with no UE support while hitting a balloon back and forth to tech with CGA    In // bars: CGA  3 x 30 sec of B LE single leg stance with unilateral support, occ 1 finger support    Pt ambulated " "up/down the hallway x 2 trials with no A.D and no Brace.  CGA/SBA for safety.        Home Exercises Provided and Patient Education Provided     Education provided:   - home exercise program  -2/16/24: PT provided new exercise for home( SLR). PT also recommended that pt " unlock" his brace at all times( to encourage improved R knee flexion) and encouraged him to ambulate more at home with his RW. Education also provided to pt and mother regarding appropriate brace position.  -3/14/24: PT provided considerable education regarding pt's need to transition out of his current knee brace. PT even pointed to his ortho visit to [facility] on -1/25/24~ recommendation at that time was for pt to begin removing his brace. However, pt continues to wear his hinged knee brace(even during sleep). PT recommended pt work on sit<> stand trials at home without use of brace and to begin sleeping without brace.  -5/30/24: need to loosen brace if it is causing skin breakdown and to allow for some room at each strap; reinforcement of edu re: how to properly don brace  -6/27/24: PT provided a schedule slip for the rest of July.      Written Home Exercises Provided: Patient instructed to cont prior HEP.    See EMR under Patient Instructions for exercises provided 2/6/2024; 2/16/24    Assessment     Joshua tolerated his tx session well and cont to progress slowly.  Joshua arrived with out his R knee brace today and performed strengthening, balance, and gait all with out it.  Cont with plan of care.     Kenji Is progressing well towards his goals.   Pt prognosis is Good.     Pt will continue to benefit from skilled outpatient physical therapy to address the deficits listed in the problem list box on initial evaluation, provide pt/family education and to maximize pt's level of independence in the home and community environment.     Pt's spiritual, cultural and educational needs considered and pt agreeable to plan of care and goals.     Anticipated " barriers to physical therapy: co-morbidities    Goals:  Short Term Goals: 4 weeks   Patient will be independent with home exercise program. MET 3/28/2024  Patient will be able to tolerate standing for at least 5 minutes with LRAD in order to improve function at home. MET 3/28/2024 (with RW)  Patient will be able to ambulate at least 50 feet with LRAD and CGA only in order to improve his independence at home. MET 3/28/2024  Patient will improve R quad strength to at least 4-/5 in order to improve his ability to tolerate standing. MET 3/28/2024  Patient will be able to complete TUG, , MCTSIB, and  testing and set appropriate long term goals. MET 5/21/2024     Long Term Goals: 8 weeks   Patient will improve FOTO limitation to at least 39% in order to demonstrate improved perception of his abilities. ongoing  Patient will be able to tolerate 5 minutes of standing balance without AD in order to improve function at home. Progressing 7/11/24  Patient will be able to ambulate at least 300' with LRAD or no AD at [facility] in order to return to job at [employer]. MET 4/25/24  Patient will be able to improve R knee flexion to at least 110 degrees in order to demonstrate normal ROM in this extremity. Progressing  Pt will improve 5x chair rise score to </= 18s without UE assist for improved muscular endurance. MET 7/11/24  Pt to perform 2 minute walk test for 350 feet or greater to improve gait speed & endurance. 1 ft shy 7/11/24  Pt will improve postural control with MCTSIB condition 1 score of at least 30s for decreased fall risk with standing ADLs. Ongoing  Pt will improve postural control with MCTSIB condition 2 score of at least 12s for decreased fall risk with standing ADLs. Ongoing       Plan     Continue to work on ambulation and stair negotiation as well as ROM of the R LE to the patient's tolerance. Focus more on standing balance and glute strength.     Marian Mercado, PTA   7/23/2024

## 2024-07-24 NOTE — PROGRESS NOTES
"[facility]   Physical Therapy Treatment Note     Name: Kenji Malone  Clinic Number: [id number]    Therapy Diagnosis:   Encounter Diagnoses   Name Primary?    Impaired functional mobility, balance, gait, and endurance Yes    Decreased strength involving knee joint     Impaired weight bearing            Physician: Order, Paper    Visit Date: 7/25/2024    Physician who did surgery: Mauricio Jaimes MD     Physician Orders: PT Eval and Treat   Medical Diagnosis from Referral: Postoperative state  Evaluation Date: 2/1/2024  Authorization Period Expiration: 12/31/2024  Plan of Care Expiration: 8/13/2024  Progress Note Due: 8/11/2024  Visit # / Visits authorized: 37/40 (+eval)  FOTO: 2/3    PTA Visit #: 0/5     Time In: 1529   Time Out: 1614   Total Billable Time: 45  minutes (TE only Medicaid)    Subjective     Pt reports: he went to [employer] last night and worked on some of the cardio machines for a few minutes. Had some pain in his R knee which caused him to limit his time on each one.  He was compliant with home exercise program.  Response to previous treatment: good  Functional change: ongoing    Pain: 0/10  Location: N/A    OBJECTIVE     Objective Measures updated on 7/11/24.      Treatment     Kenji received the treatments listed below:      therapeutic exercises to develop strength, endurance, ROM, flexibility, posture, and core stabilization for 45 minutes including:      3 x 10 reps supine B LE SLR~ 1.5 # ankle weight added on R side   3 x 30 sec of R manual quad stretches for increased knee flexion   3 x 10 reps of prone B hamstring curls ~ 3 # ankle weight added on R side  3 x 10 reps supine glute bridges  2 x 30" passive R LE quad stretches with pt in supine         Standing balance/tolerance:    Inside parallel bars:    1 x 10 B LE forward/backward step ups/downs on foam fitter, 1-3 finger support from 1 UE, CGA to slight min A( more UE support when stepping up first with L " "LE)  2 x 30" B LE alternate split stance with lead foot on foam fitter and rear foot on floor, no UE support, min A with occasional slight mod A with R foot on fitter board    X 4 laps of backward walking with 1-2 finger support from one UE, CGA    Outside parallel bars:    1 x 10 sit<> stand trials from mat with feet resting on foam fitter, no UE support, CGA  1 x 10 sit<> stand trials from mat with feet resting on foam fitter and pt holding beach ball, no UE support, CGA/min A  1 x 10 sit<> stand trials from mat with feet resting on foam pad, no UE support, CGA    X 2 minutes of standing and tossing beach ball back and forth with PT tech, CGA to occasional slight min A    Pt ambulates 2 consecutive laps around gym with no AD, CGA    Pt ascends and descends 16 six inch steps with non-reciprocal pattern. Pt uses 1 UE support to ascend and 1-2 UE support to descend      Home Exercises Provided and Patient Education Provided     Education provided:   - home exercise program  -2/16/24: PT provided new exercise for home( SLR). PT also recommended that pt " unlock" his brace at all times( to encourage improved R knee flexion) and encouraged him to ambulate more at home with his RW. Education also provided to pt and mother regarding appropriate brace position.  -3/14/24: PT provided considerable education regarding pt's need to transition out of his current knee brace. PT even pointed to his ortho visit to [facility] on -1/25/24~ recommendation at that time was for pt to begin removing his brace. However, pt continues to wear his hinged knee brace(even during sleep). PT recommended pt work on sit<> stand trials at home without use of brace and to begin sleeping without brace.  -5/30/24: need to loosen brace if it is causing skin breakdown and to allow for some room at each strap; reinforcement of edu re: how to properly don brace  -6/27/24: PT provided a schedule slip for the rest of July.    -7/25/24: PT provided a schedule " slip for August.    Written Home Exercises Provided: Patient instructed to cont prior HEP.    See EMR under Patient Instructions for exercises provided 2/6/2024; 2/16/24    Assessment     Joshua tolerated his treatment session well and continues to progress slowly. As mentioned in previous notes, he is only wearing his R knee brace at night when he sleeps. During supine exercises, noted improvement with R LE SLR, even with 1/2 # increase in ankle weight. Pt also demonstrated good ability to perform supine bridges with R foot slightly posterior to L. PT allotted considerable time to working on pt's balance in a variety of conditions of observation. Joshua also practiced sit<> stand with feet on foam fitter and foam pad. He remains appropriate for continued therapy to ready him for his next surgery.     Kenji Is progressing well towards his goals.   Pt prognosis is Good.     Pt will continue to benefit from skilled outpatient physical therapy to address the deficits listed in the problem list box on initial evaluation, provide pt/family education and to maximize pt's level of independence in the home and community environment.     Pt's spiritual, cultural and educational needs considered and pt agreeable to plan of care and goals.     Anticipated barriers to physical therapy: co-morbidities    Goals:  Short Term Goals: 4 weeks   Patient will be independent with home exercise program. MET 3/28/2024  Patient will be able to tolerate standing for at least 5 minutes with LRAD in order to improve function at home. MET 3/28/2024 (with RW)  Patient will be able to ambulate at least 50 feet with LRAD and CGA only in order to improve his independence at home. MET 3/28/2024  Patient will improve R quad strength to at least 4-/5 in order to improve his ability to tolerate standing. MET 3/28/2024  Patient will be able to complete TUG, , MCTSIB, and  testing and set appropriate long term goals. MET 5/21/2024     Long Term Goals: 8  weeks   Patient will improve FOTO limitation to at least 39% in order to demonstrate improved perception of his abilities. ongoing  Patient will be able to tolerate 5 minutes of standing balance without AD in order to improve function at home. Progressing 7/11/24  Patient will be able to ambulate at least 300' with LRAD or no AD at [facility] in order to return to job at [employer]. MET 4/25/24  Patient will be able to improve R knee flexion to at least 110 degrees in order to demonstrate normal ROM in this extremity. Progressing  Pt will improve 5x chair rise score to </= 18s without UE assist for improved muscular endurance. MET 7/11/24  Pt to perform 2 minute walk test for 350 feet or greater to improve gait speed & endurance. 1 ft shy 7/11/24  Pt will improve postural control with MCTSIB condition 1 score of at least 30s for decreased fall risk with standing ADLs. Ongoing  Pt will improve postural control with MCTSIB condition 2 score of at least 12s for decreased fall risk with standing ADLs. Ongoing       Plan     Continue to work on ambulation and stair negotiation as well as ROM of the R LE to the patient's tolerance. Focus more on standing balance and glute strength.     David Frankel, PT   7/25/2024

## 2024-07-25 ENCOUNTER — CLINICAL SUPPORT (OUTPATIENT)
Dept: REHABILITATION | Facility: HOSPITAL | Age: 19
End: 2024-07-25
Payer: MEDICAID

## 2024-07-25 DIAGNOSIS — R29.898 DECREASED STRENGTH INVOLVING KNEE JOINT: ICD-10-CM

## 2024-07-25 DIAGNOSIS — R26.89 IMPAIRED WEIGHT BEARING: ICD-10-CM

## 2024-07-25 DIAGNOSIS — Z74.09 IMPAIRED FUNCTIONAL MOBILITY, BALANCE, GAIT, AND ENDURANCE: Primary | ICD-10-CM

## 2024-07-25 PROCEDURE — 97110 THERAPEUTIC EXERCISES: CPT | Mod: PO

## 2024-07-30 ENCOUNTER — CLINICAL SUPPORT (OUTPATIENT)
Dept: REHABILITATION | Facility: HOSPITAL | Age: 19
End: 2024-07-30
Payer: MEDICAID

## 2024-07-30 DIAGNOSIS — R29.898 DECREASED STRENGTH INVOLVING KNEE JOINT: ICD-10-CM

## 2024-07-30 DIAGNOSIS — Z74.09 IMPAIRED FUNCTIONAL MOBILITY, BALANCE, GAIT, AND ENDURANCE: Primary | ICD-10-CM

## 2024-07-30 DIAGNOSIS — R26.89 IMPAIRED WEIGHT BEARING: ICD-10-CM

## 2024-07-30 PROCEDURE — 97110 THERAPEUTIC EXERCISES: CPT | Mod: PO,CQ

## 2024-07-30 NOTE — PROGRESS NOTES
"[facility]   Physical Therapy Treatment Note     Name: Kenji Malone  Clinic Number: [id number]    Therapy Diagnosis:   Encounter Diagnoses   Name Primary?    Impaired functional mobility, balance, gait, and endurance Yes    Decreased strength involving knee joint     Impaired weight bearing            Physician: Order, Paper    Visit Date: 7/30/2024    Physician who did surgery: Mauricio Jaimes MD     Physician Orders: PT Eval and Treat   Medical Diagnosis from Referral: Postoperative state  Evaluation Date: 2/1/2024  Authorization Period Expiration: 12/31/2024  Plan of Care Expiration: 8/13/2024  Progress Note Due: 8/11/2024  Visit # / Visits authorized: 37/40 (+eval)  FOTO: 2/3    PTA Visit #: 1/5     Time In: 1530   Time Out: 1615   Total Billable Time: 45  minutes (TE only Medicaid)    Subjective     Pt reports: " I'd love to work on balance if I can after."   Mom reports: " Can we see how he walks with the cane?"  He was compliant with home exercise program.  Response to previous treatment: good  Functional change: ongoing    Pain: 0/10  Location: N/A    OBJECTIVE     Objective Measures updated on 7/11/24.      Treatment     Kenji received the treatments listed below:      therapeutic exercises to develop strength, endurance, ROM, flexibility, posture, and core stabilization for 45 minutes including:      3 x 10 reps supine B LE SLR~ 1.5 # ankle weight added on R side   3 x 30 sec of R manual quad stretches for increased knee flexion in sidelying  3 x 10 reps supine glute bridges  Prone:  3 x 30 sec of R quad stretches to increase knee flexion  3 x 10 reps of prone B hamstring curls ~ 3 # ankle weight added on R side    Standing balance:   X 5 min of standing balance while using B UEs to hit bubbles that were falling from all directions    Pt ascends and descends 4 steps x 4 trials with non-reciprocal pattern. Pt uses 1 UE support to ascend and 1-2 UE support to descend      Home " "Exercises Provided and Patient Education Provided     Education provided:   - home exercise program  -2/16/24: PT provided new exercise for home( SLR). PT also recommended that pt " unlock" his brace at all times( to encourage improved R knee flexion) and encouraged him to ambulate more at home with his RW. Education also provided to pt and mother regarding appropriate brace position.  -3/14/24: PT provided considerable education regarding pt's need to transition out of his current knee brace. PT even pointed to his ortho visit to [facility] on -1/25/24~ recommendation at that time was for pt to begin removing his brace. However, pt continues to wear his hinged knee brace(even during sleep). PT recommended pt work on sit<> stand trials at home without use of brace and to begin sleeping without brace.  -5/30/24: need to loosen brace if it is causing skin breakdown and to allow for some room at each strap; reinforcement of edu re: how to properly don brace  -6/27/24: PT provided a schedule slip for the rest of July.    -7/25/24: PT provided a schedule slip for August.    Written Home Exercises Provided: Patient instructed to cont prior HEP.    See EMR under Patient Instructions for exercises provided 2/6/2024; 2/16/24    Assessment     Joshua tolerated his tx session well and did not have any problems noted.  Joshua's mom informed therapist that she wanted to try a cane with him, so Joshua had a cane trial, but he ambulated slower and did not like how he ambulated with it.  Joshua was able to stand longer today while hitting the bubbles with CGA.   He remains appropriate for continued therapy to ready him for his next surgery.     Kenji Is progressing well towards his goals.   Pt prognosis is Good.     Pt will continue to benefit from skilled outpatient physical therapy to address the deficits listed in the problem list box on initial evaluation, provide pt/family education and to maximize pt's level of independence in the home " and community environment.     Pt's spiritual, cultural and educational needs considered and pt agreeable to plan of care and goals.     Anticipated barriers to physical therapy: co-morbidities    Goals:  Short Term Goals: 4 weeks   Patient will be independent with home exercise program. MET 3/28/2024  Patient will be able to tolerate standing for at least 5 minutes with LRAD in order to improve function at home. MET 3/28/2024 (with RW)  Patient will be able to ambulate at least 50 feet with LRAD and CGA only in order to improve his independence at home. MET 3/28/2024  Patient will improve R quad strength to at least 4-/5 in order to improve his ability to tolerate standing. MET 3/28/2024  Patient will be able to complete TUG, , MCTSIB, and  testing and set appropriate long term goals. MET 5/21/2024     Long Term Goals: 8 weeks   Patient will improve FOTO limitation to at least 39% in order to demonstrate improved perception of his abilities. ongoing  Patient will be able to tolerate 5 minutes of standing balance without AD in order to improve function at home. Progressing 7/11/24  Patient will be able to ambulate at least 300' with LRAD or no AD at [facility] in order to return to job at [employer]. MET 4/25/24  Patient will be able to improve R knee flexion to at least 110 degrees in order to demonstrate normal ROM in this extremity. Progressing  Pt will improve 5x chair rise score to </= 18s without UE assist for improved muscular endurance. MET 7/11/24  Pt to perform 2 minute walk test for 350 feet or greater to improve gait speed & endurance. 1 ft shy 7/11/24  Pt will improve postural control with MCTSIB condition 1 score of at least 30s for decreased fall risk with standing ADLs. Ongoing  Pt will improve postural control with MCTSIB condition 2 score of at least 12s for decreased fall risk with standing ADLs. Ongoing       Plan     Continue to work on ambulation and stair negotiation as well as ROM of  the R LE to the patient's tolerance. Focus more on standing balance and glute strength.     Marian Mercado, PTA   7/30/2024

## 2024-08-07 NOTE — PROGRESS NOTES
Outpatient Care Management   - Care Plan Follow Up    Patient: Kenji Malone  MRN:  [id number]  Date of Service:  1/29/2024  Completed by:  Tsering Manzo LCSW  Referral Date: 12/12/2023    Reason for Visit   Patient presents with    OPCM SW Follow Up Call       Brief Summary: SW received call from Pt mother regarding having obtained the restrictions from the MD and wanting to schedule the PT.  was able to schedule his appts and she will bring him.     Future Appointments   Date Time Provider Department Center   2/1/2024  1:45 PM [facility] PT [facility]     Tsering Manzo LCSW  Neuro Therapy   [facility]  [phone]      
No-Patient/Caregiver offered and refused free interpretation services.

## 2024-08-09 ENCOUNTER — CLINICAL SUPPORT (OUTPATIENT)
Dept: REHABILITATION | Facility: HOSPITAL | Age: 19
End: 2024-08-09
Payer: MEDICAID

## 2024-08-09 DIAGNOSIS — Z74.09 IMPAIRED FUNCTIONAL MOBILITY, BALANCE, GAIT, AND ENDURANCE: Primary | ICD-10-CM

## 2024-08-09 DIAGNOSIS — R29.898 DECREASED STRENGTH INVOLVING KNEE JOINT: ICD-10-CM

## 2024-08-09 DIAGNOSIS — R26.89 IMPAIRED WEIGHT BEARING: ICD-10-CM

## 2024-08-09 PROCEDURE — 97110 THERAPEUTIC EXERCISES: CPT | Mod: PO

## 2024-08-12 ENCOUNTER — DOCUMENTATION ONLY (OUTPATIENT)
Dept: REHABILITATION | Facility: HOSPITAL | Age: 19
End: 2024-08-12
Payer: MEDICAID

## 2024-08-12 NOTE — PROGRESS NOTES
PT/PTA met face to face to discuss pt's treatment plan and progress towards established goals.  Continue with current PT POC with focus on strength, endurance, and balance.  Patient will be seen by physical therapist at least every sixth treatment or 30 days, whichever occurs first.    Marian Mercado, PTA  08/12/2024

## 2024-08-12 NOTE — PROGRESS NOTES
See updated PT POC in plan of care.    Laly Fagan, PT, DPT, NCS    [facility]   Physical Therapy Treatment Note / Plan of Care Update    Name: Kenji Malone  Clinic Number: [id number]    Therapy Diagnosis:   Encounter Diagnoses   Name Primary?    Impaired functional mobility, balance, gait, and endurance Yes    Decreased strength involving knee joint     Impaired weight bearing          Physician: Order, Paper    Visit Date: 8/13/2024    Physician who did surgery: Mauricio Jaimes MD     Physician Orders: PT Eval and Treat   Medical Diagnosis from Referral: Postoperative state  Evaluation Date: 2/1/2024  Authorization Period Expiration: 12/31/2024  Plan of Care Expiration: 9/24/2024  Progress Note Due: 9/13/2024  Visit # / Visits authorized: 40/40 (+eval)  FOTO: 3/3    PTA Visit #: 0/5     Time In: 15:31  Time Out: 16:12  Total Billable Time: 41 minutes (TE only Medicaid)    Subjective     Pt reports: he's doing well today. He had a good weekend. Pt reports he went to the gym yesterday and did his exercises. He is no longer wearing his knee brace during the day.   He was compliant with home exercise program.  Response to previous treatment: good  Functional change: ongoing    Pain: 0/10  Location: N/A    OBJECTIVE     Objective Measures updated on 8/13/24.    RLE ROM:  Knee flexion = 110 deg     Lower Extremity Strength~ tested in sitting  Right LE 7/11/2024 8/13/2024 Left LE 7/11/24 8/13/2024   Hip flexion:  5/5 5/5 Hip flexion: 5/5 5/5   Knee extension: 5/5 5/5 Knee extension: 5/5 5/5   Knee flexion: 4+/5 5/5 Knee flexion: 4/5 4/5   Ankle dorsiflexion:  2-/5 2/5 Ankle dorsiflexion: 1/5 1/5   Ankle plantarflexion:  2-/5 2/5 Ankle plantarflexion: 1/5 1/5   Hip abduction: 4-/5 4-/5 Hip abduction: 4/5 4/5   Hip adduction: 5/5 5/5 Hip adduction 5/5 5/5   Hip extension: 4+/5 5/5 Hip extension: 5/5 5/5   *within range of motion     5x STS:   14.74 seconds with arms  19.17 seconds without  "arms      Self-Selected Gait Speed:   Trial 1: 6 meters in 6.97 seconds, 0.86 m/s with RW  Trial 2: 6 meters in 6.68 seconds, 0.90 m/s without RW     2-Minute Walk Test  AD used: none, PT CGA  Distance: 325 ft     MCTSIB:  Condition 1 = 17 sec  Condition 2 = 8 sec  Condition 3 = 16 sec  Condition 4 = 1 sec    Treatment     Kenji received the treatments listed below:      therapeutic exercises to develop strength, endurance, ROM, flexibility, posture, and core stabilization for 41 minutes including:    Objective testing     3 x 10 reps supine B LE SLR~ 1.5 # ankle weight added on R side   3 x 10 reps supine glute bridges    Prone:  3 x 30 sec of R quad stretches to increase knee flexion with PT manual facilitation   3 x 10 reps of prone B hamstring curls ~ 3 # ankle weight added on R side    10x STS from low mat focusing on coordination/timing between quads and glutes    Not performed today:  2 x 30 ft each UE of walking suitcase carry with 10# KB in hand + PT CGA  2x laps each side of bending down to  10# KB and then walking 10 ft before turning around and putting it back down to floor  Standing + bending down to  bean bags and then tossing to bucket placed ~10 ft away (2 trials, 12 bean bags)      Home Exercises Provided and Patient Education Provided     Education provided:   - home exercise program  -2/16/24: PT provided new exercise for home( SLR). PT also recommended that pt " unlock" his brace at all times( to encourage improved R knee flexion) and encouraged him to ambulate more at home with his RW. Education also provided to pt and mother regarding appropriate brace position.  -3/14/24: PT provided considerable education regarding pt's need to transition out of his current knee brace. PT even pointed to his ortho visit to [facility] on -1/25/24~ recommendation at that time was for pt to begin removing his brace. However, pt continues to wear his hinged knee brace(even during sleep). PT recommended " pt work on sit<> stand trials at home without use of brace and to begin sleeping without brace.  -5/30/24: need to loosen brace if it is causing skin breakdown and to allow for some room at each strap; reinforcement of edu re: how to properly don brace  -6/27/24: PT provided a schedule slip for the rest of July.    -7/25/24: PT provided a schedule slip for August.    Written Home Exercises Provided: Patient instructed to cont prior HEP.    See EMR under Patient Instructions for exercises provided 2/6/2024; 2/16/24    Assessment     Joshua is making consistent gains towards goals, demonstrating improved R knee flexion range of motion, RLE strength, and improved independence with unsupported functional mobility. While he does continue to utilize his rolling walker, he is able to ambulate limited community distances without an assistive device and with no loss of balance noted, indicating improved standing balance. This is further corroborated by his MCTSIB times improving for conditions 1-3 today. He appears to be more limited by fear of falling as well as some timing/coordination deficits for extensors but overall progressing appropriately in therapy.     Kenji Is progressing well towards his goals.   Pt prognosis is Good.     Pt will continue to benefit from skilled outpatient physical therapy to address the deficits listed in the problem list box on initial evaluation, provide pt/family education and to maximize pt's level of independence in the home and community environment.     Pt's spiritual, cultural and educational needs considered and pt agreeable to plan of care and goals.     Anticipated barriers to physical therapy: co-morbidities    Goals:  Short Term Goals: 4 weeks   Patient will be independent with home exercise program. MET 3/28/2024  Patient will be able to tolerate standing for at least 5 minutes with LRAD in order to improve function at home. MET 3/28/2024 (with RW)  Patient will be able to ambulate  at least 50 feet with LRAD and CGA only in order to improve his independence at home. MET 3/28/2024  Patient will improve R quad strength to at least 4-/5 in order to improve his ability to tolerate standing. MET 3/28/2024  Patient will be able to complete TUG, , MCTSIB, and  testing and set appropriate long term goals. MET 5/21/2024     Long Term Goals: 8 weeks   Patient will improve FOTO limitation to at least 39% in order to demonstrate improved perception of his abilities. ongoing  Patient will be able to tolerate 5 minutes of standing balance without AD in order to improve function at home. Progressing 7/11/24  Patient will be able to ambulate at least 300' with LRAD or no AD at [facility] in order to return to job at [employer]. MET 4/25/24  Patient will be able to improve R knee flexion to at least 110 degrees in order to demonstrate normal ROM in this extremity. MET 8/13/24  Pt will improve 5x chair rise score to </= 18s without UE assist for improved muscular endurance. MET 7/11/24  Pt to perform 2 minute walk test for 350 feet or greater to improve gait speed & endurance. Ongoing 8/13/24  Pt will improve postural control with MCTSIB condition 1 score of at least 30s for decreased fall risk with standing ADLs. Ongoing  Pt will improve postural control with MCTSIB condition 2 score of at least 12s for decreased fall risk with standing ADLs. Ongoing       Plan     Plan of care Certification: 8/13/2024 to 9/24/2024.     Outpatient Physical Therapy 2 times weekly for 6 weeks to include the following interventions: Gait Training, Manual Therapy, Moist Heat/ Ice, Neuromuscular Re-ed, Orthotic Management and Training, Patient Education, Self Care, Therapeutic Activities, and Therapeutic Exercise. Plan may be carried out by a PTA.    Continue to work on ambulation and stair negotiation as well as ROM of the R LE to the patient's tolerance. Focus more on standing balance and glute strength.     Laly  Rylan, PT   8/13/2024

## 2024-08-13 ENCOUNTER — CLINICAL SUPPORT (OUTPATIENT)
Dept: REHABILITATION | Facility: HOSPITAL | Age: 19
End: 2024-08-13
Payer: MEDICAID

## 2024-08-13 DIAGNOSIS — R26.89 IMPAIRED WEIGHT BEARING: ICD-10-CM

## 2024-08-13 DIAGNOSIS — R29.898 DECREASED STRENGTH INVOLVING KNEE JOINT: ICD-10-CM

## 2024-08-13 DIAGNOSIS — Z74.09 IMPAIRED FUNCTIONAL MOBILITY, BALANCE, GAIT, AND ENDURANCE: Primary | ICD-10-CM

## 2024-08-13 PROCEDURE — 97110 THERAPEUTIC EXERCISES: CPT | Mod: PO

## 2024-08-13 NOTE — PLAN OF CARE
[facility]   Physical Therapy Treatment Note / Plan of Care Update    Name: Kenji Malone  Clinic Number: [id number]    Therapy Diagnosis:   Encounter Diagnoses   Name Primary?    Impaired functional mobility, balance, gait, and endurance Yes    Decreased strength involving knee joint     Impaired weight bearing          Physician: Order, Paper    Visit Date: 8/13/2024    Physician who did surgery: Mauricio Jaimes MD     Physician Orders: PT Eval and Treat   Medical Diagnosis from Referral: Postoperative state  Evaluation Date: 2/1/2024  Authorization Period Expiration: 12/31/2024  Plan of Care Expiration: 9/24/2024  Progress Note Due: 9/13/2024  Visit # / Visits authorized: 40/40 (+eval)  FOTO: 3/3    PTA Visit #: 0/5     Time In: 15:31  Time Out: 16:12  Total Billable Time: 41 minutes (TE only Medicaid)    Subjective     Pt reports: he's doing well today. He had a good weekend. Pt reports he went to the gym yesterday and did his exercises. He is no longer wearing his knee brace during the day.   He was compliant with home exercise program.  Response to previous treatment: good  Functional change: ongoing    Pain: 0/10  Location: N/A    OBJECTIVE     Objective Measures updated on 8/13/24.    RLE ROM:  Knee flexion = 110 deg     Lower Extremity Strength~ tested in sitting  Right LE 7/11/2024 8/13/2024 Left LE 7/11/24 8/13/2024   Hip flexion:  5/5 5/5 Hip flexion: 5/5 5/5   Knee extension: 5/5 5/5 Knee extension: 5/5 5/5   Knee flexion: 4+/5 5/5 Knee flexion: 4/5 4/5   Ankle dorsiflexion:  2-/5 2/5 Ankle dorsiflexion: 1/5 1/5   Ankle plantarflexion:  2-/5 2/5 Ankle plantarflexion: 1/5 1/5   Hip abduction: 4-/5 4-/5 Hip abduction: 4/5 4/5   Hip adduction: 5/5 5/5 Hip adduction 5/5 5/5   Hip extension: 4+/5 5/5 Hip extension: 5/5 5/5   *within range of motion     5x STS:   14.74 seconds with arms  19.17 seconds without arms      Self-Selected Gait Speed:   Trial 1: 6 meters in 6.97 seconds,  "0.86 m/s with RW  Trial 2: 6 meters in 6.68 seconds, 0.90 m/s without RW     2-Minute Walk Test  AD used: none, PT CGA  Distance: 325 ft     MCTSIB:  Condition 1 = 17 sec  Condition 2 = 8 sec  Condition 3 = 16 sec  Condition 4 = 1 sec    Treatment     Kenji received the treatments listed below:      therapeutic exercises to develop strength, endurance, ROM, flexibility, posture, and core stabilization for 41 minutes including:    Objective testing     3 x 10 reps supine B LE SLR~ 1.5 # ankle weight added on R side   3 x 10 reps supine glute bridges    Prone:  3 x 30 sec of R quad stretches to increase knee flexion with PT manual facilitation   3 x 10 reps of prone B hamstring curls ~ 3 # ankle weight added on R side    10x STS from low mat focusing on coordination/timing between quads and glutes    Not performed today:  2 x 30 ft each UE of walking suitcase carry with 10# KB in hand + PT CGA  2x laps each side of bending down to  10# KB and then walking 10 ft before turning around and putting it back down to floor  Standing + bending down to  bean bags and then tossing to bucket placed ~10 ft away (2 trials, 12 bean bags)      Home Exercises Provided and Patient Education Provided     Education provided:   - home exercise program  -2/16/24: PT provided new exercise for home( SLR). PT also recommended that pt " unlock" his brace at all times( to encourage improved R knee flexion) and encouraged him to ambulate more at home with his RW. Education also provided to pt and mother regarding appropriate brace position.  -3/14/24: PT provided considerable education regarding pt's need to transition out of his current knee brace. PT even pointed to his ortho visit to [facility] on -1/25/24~ recommendation at that time was for pt to begin removing his brace. However, pt continues to wear his hinged knee brace(even during sleep). PT recommended pt work on sit<> stand trials at home without use of brace and to begin " sleeping without brace.  -5/30/24: need to loosen brace if it is causing skin breakdown and to allow for some room at each strap; reinforcement of edu re: how to properly don brace  -6/27/24: PT provided a schedule slip for the rest of July.    -7/25/24: PT provided a schedule slip for August.    Written Home Exercises Provided: Patient instructed to cont prior HEP.    See EMR under Patient Instructions for exercises provided 2/6/2024; 2/16/24    Assessment     Joshua is making consistent gains towards goals, demonstrating improved R knee flexion range of motion, RLE strength, and improved independence with unsupported functional mobility. While he does continue to utilize his rolling walker, he is able to ambulate limited community distances without an assistive device and with no loss of balance noted, indicating improved standing balance. This is further corroborated by his MCTSIB times improving for conditions 1-3 today. He appears to be more limited by fear of falling as well as some timing/coordination deficits for extensors but overall progressing appropriately in therapy.     Kejni Is progressing well towards his goals.   Pt prognosis is Good.     Pt will continue to benefit from skilled outpatient physical therapy to address the deficits listed in the problem list box on initial evaluation, provide pt/family education and to maximize pt's level of independence in the home and community environment.     Pt's spiritual, cultural and educational needs considered and pt agreeable to plan of care and goals.     Anticipated barriers to physical therapy: co-morbidities    Goals:  Short Term Goals: 4 weeks   Patient will be independent with home exercise program. MET 3/28/2024  Patient will be able to tolerate standing for at least 5 minutes with LRAD in order to improve function at home. MET 3/28/2024 (with RW)  Patient will be able to ambulate at least 50 feet with LRAD and CGA only in order to improve his  independence at home. MET 3/28/2024  Patient will improve R quad strength to at least 4-/5 in order to improve his ability to tolerate standing. MET 3/28/2024  Patient will be able to complete TUG, , MCTSIB, and  testing and set appropriate long term goals. MET 5/21/2024     Long Term Goals: 8 weeks   Patient will improve FOTO limitation to at least 39% in order to demonstrate improved perception of his abilities. ongoing  Patient will be able to tolerate 5 minutes of standing balance without AD in order to improve function at home. Progressing 7/11/24  Patient will be able to ambulate at least 300' with LRAD or no AD at [facility] in order to return to job at [employer]. MET 4/25/24  Patient will be able to improve R knee flexion to at least 110 degrees in order to demonstrate normal ROM in this extremity. MET 8/13/24  Pt will improve 5x chair rise score to </= 18s without UE assist for improved muscular endurance. MET 7/11/24  Pt to perform 2 minute walk test for 350 feet or greater to improve gait speed & endurance. Ongoing 8/13/24  Pt will improve postural control with MCTSIB condition 1 score of at least 30s for decreased fall risk with standing ADLs. Ongoing  Pt will improve postural control with MCTSIB condition 2 score of at least 12s for decreased fall risk with standing ADLs. Ongoing       Plan     Plan of care Certification: 8/13/2024 to 9/24/2024.     Outpatient Physical Therapy 2 times weekly for 6 weeks to include the following interventions: Gait Training, Manual Therapy, Moist Heat/ Ice, Neuromuscular Re-ed, Orthotic Management and Training, Patient Education, Self Care, Therapeutic Activities, and Therapeutic Exercise. Plan may be carried out by a PTA.    Continue to work on ambulation and stair negotiation as well as ROM of the R LE to the patient's tolerance. Focus more on standing balance and glute strength.     Laly Fagan, PT   8/13/2024

## 2024-08-15 ENCOUNTER — CLINICAL SUPPORT (OUTPATIENT)
Dept: REHABILITATION | Facility: HOSPITAL | Age: 19
End: 2024-08-15
Payer: MEDICAID

## 2024-08-15 DIAGNOSIS — Z74.09 IMPAIRED FUNCTIONAL MOBILITY, BALANCE, GAIT, AND ENDURANCE: Primary | ICD-10-CM

## 2024-08-15 DIAGNOSIS — R26.89 IMPAIRED WEIGHT BEARING: ICD-10-CM

## 2024-08-15 DIAGNOSIS — R29.898 DECREASED STRENGTH INVOLVING KNEE JOINT: ICD-10-CM

## 2024-08-15 PROCEDURE — 97110 THERAPEUTIC EXERCISES: CPT | Mod: PO

## 2024-08-21 NOTE — PROGRESS NOTES
[facility]   Physical Therapy Treatment Note    Name: Kenji Malone  Clinic Number: [id number]    Therapy Diagnosis:   Encounter Diagnoses   Name Primary?    Impaired functional mobility, balance, gait, and endurance Yes    Decreased strength involving knee joint     Impaired weight bearing            Physician: Order, Paper    Visit Date: 8/22/2024    Physician who did surgery: Mauricio Jaimes MD     Physician Orders: PT Eval and Treat   Medical Diagnosis from Referral: Postoperative state  Evaluation Date: 2/1/2024  Authorization Period Expiration: 12/31/2024  Plan of Care Expiration: 9/24/2024  Progress Note Due: 9/13/2024  Visit # / Visits authorized: 42/50 (+eval)  FOTO: 3/3    PTA Visit #: 0/5     Time In: 1433~ pt used restroom at start of session   Time Out: 1515   Total Billable Time: 42  minutes (TE only Medicaid)    Subjective     Pt reports: he is doing well. He's been working out at [employer].  He was compliant with home exercise program.  Response to previous treatment: good  Functional change: ongoing    Pain: 0/10  Location: N/A    OBJECTIVE     Objective Measures updated on 8/13/24.    Treatment     Kenji received the treatments listed below:      therapeutic exercises to develop strength, endurance, ROM, flexibility, posture, and core stabilization for 42 minutes including:    3 x 10 reps supine B LE SLR~ 1.5 # ankle weight added on R side   3 x 10 reps supine glute bridges    Prone:  3 x 30 sec of R quad stretches to increase knee flexion with PT manual facilitation   3 x 10 reps of prone B hamstring curls ~ 3 # ankle weight added on R side    2 x 10 reps sit<> stand from mat focusing on coordination/timing between quads and glutes~ pt holds 2.2 # ball in hands~ CGA to occasional slight min A to prevent R knee hyperextension    1 x 10 reps sit<> stand from mat focusing on coordination/timing between quads and glutes~ pt holds 10 # tidal tank ball in hands~ CGA to  "occasional slight min A to prevent R knee hyperextension    X1 lap around gym of each UE walking suitcase carry with 10 # kettle bell in hand + PT CGA      All performed with PT CGA:   2 x 40 ft walking with head turns  2 x 40 ft walking with head nods    Ascend and descend 12 steps with L hand on rail, CGA, step-to pattern      X 2 minutes standing and bouncing orange physioball back and forth with PT tech, CGA to occasional min A due to post lean       Standing with PT, CGA to slight min A + bending down to  12 bean bags and place on [employer] table~ 1 round placing on L side and 1 round placing on R side  - several bouts of knees buckling/hyperextension, requiring one seated rest break to complete all trials      Home Exercises Provided and Patient Education Provided     Education provided:   - home exercise program  -2/16/24: PT provided new exercise for home( SLR). PT also recommended that pt " unlock" his brace at all times( to encourage improved R knee flexion) and encouraged him to ambulate more at home with his RW. Education also provided to pt and mother regarding appropriate brace position.  -3/14/24: PT provided considerable education regarding pt's need to transition out of his current knee brace. PT even pointed to his ortho visit to [facility] on -1/25/24~ recommendation at that time was for pt to begin removing his brace. However, pt continues to wear his hinged knee brace(even during sleep). PT recommended pt work on sit<> stand trials at home without use of brace and to begin sleeping without brace.  -5/30/24: need to loosen brace if it is causing skin breakdown and to allow for some room at each strap; reinforcement of edu re: how to properly don brace  -6/27/24: PT provided a schedule slip for the rest of July.    -7/25/24: PT provided a schedule slip for August.    Written Home Exercises Provided: Patient instructed to cont prior HEP.    See EMR under Patient Instructions for exercises provided " 2/6/2024; 2/16/24    Assessment     Joshua tolerated today's session well. He performed nicely with most strengthening exercises on mat, though L SLR looked a bit more labored when compared to R. PT was very impressed with pt's ability to tolerate a considerable degree of stretch to R quad with no pain. He continues to respond to sit<> stand challenges, though his R knee tends to hyperextend fairly often. This tendency was also seen during standing balance activity of bouncing orange physioball, during which he also tended to drift posteriorly. He managed stairs well with use of only 1 handrail. Overall, he is progressing appropriately and remains a good candidate for OP PT at this time.      Kenji Is progressing well towards his goals.   Pt prognosis is Good.     Pt will continue to benefit from skilled outpatient physical therapy to address the deficits listed in the problem list box on initial evaluation, provide pt/family education and to maximize pt's level of independence in the home and community environment.     Pt's spiritual, cultural and educational needs considered and pt agreeable to plan of care and goals.     Anticipated barriers to physical therapy: co-morbidities    Goals:  Short Term Goals: 4 weeks   Patient will be independent with home exercise program. MET 3/28/2024  Patient will be able to tolerate standing for at least 5 minutes with LRAD in order to improve function at home. MET 3/28/2024 (with RW)  Patient will be able to ambulate at least 50 feet with LRAD and CGA only in order to improve his independence at home. MET 3/28/2024  Patient will improve R quad strength to at least 4-/5 in order to improve his ability to tolerate standing. MET 3/28/2024  Patient will be able to complete TUG, , MCTSIB, and  testing and set appropriate long term goals. MET 5/21/2024     Long Term Goals: 8 weeks   Patient will improve FOTO limitation to at least 39% in order to demonstrate improved perception of his  abilities. ongoing  Patient will be able to tolerate 5 minutes of standing balance without AD in order to improve function at home. Progressing 7/11/24  Patient will be able to ambulate at least 300' with LRAD or no AD at [facility] in order to return to job at [employer]. MET 4/25/24  Patient will be able to improve R knee flexion to at least 110 degrees in order to demonstrate normal ROM in this extremity. MET 8/13/24  Pt will improve 5x chair rise score to </= 18s without UE assist for improved muscular endurance. MET 7/11/24  Pt to perform 2 minute walk test for 350 feet or greater to improve gait speed & endurance. Ongoing 8/13/24  Pt will improve postural control with MCTSIB condition 1 score of at least 30s for decreased fall risk with standing ADLs. Ongoing  Pt will improve postural control with MCTSIB condition 2 score of at least 12s for decreased fall risk with standing ADLs. Ongoing       Plan     Continue to work on ambulation and stair negotiation as well as ROM of the R LE to the patient's tolerance. Focus more on standing balance and glute strength.     David Frankel, PT   8/22/2024

## 2024-08-22 ENCOUNTER — CLINICAL SUPPORT (OUTPATIENT)
Dept: REHABILITATION | Facility: HOSPITAL | Age: 19
End: 2024-08-22
Payer: MEDICAID

## 2024-08-22 DIAGNOSIS — R29.898 DECREASED STRENGTH INVOLVING KNEE JOINT: ICD-10-CM

## 2024-08-22 DIAGNOSIS — Z74.09 IMPAIRED FUNCTIONAL MOBILITY, BALANCE, GAIT, AND ENDURANCE: Primary | ICD-10-CM

## 2024-08-22 DIAGNOSIS — R26.89 IMPAIRED WEIGHT BEARING: ICD-10-CM

## 2024-08-22 PROCEDURE — 97110 THERAPEUTIC EXERCISES: CPT | Mod: PO

## 2024-08-26 NOTE — PROGRESS NOTES
[facility]   Physical Therapy Treatment Note    Name: Kenji aMlone  Clinic Number: [id number]    Therapy Diagnosis:   Encounter Diagnoses   Name Primary?    Impaired functional mobility, balance, gait, and endurance Yes    Decreased strength involving knee joint     Impaired weight bearing          Physician: Order, Paper    Visit Date: 8/27/2024    Physician who did surgery: Mauricio Jaimes MD     Physician Orders: PT Eval and Treat   Medical Diagnosis from Referral: Postoperative state  Evaluation Date: 2/1/2024  Authorization Period Expiration: 12/31/2024  Plan of Care Expiration: 9/24/2024  Progress Note Due: 9/13/2024  Visit # / Visits authorized: 43/50 (+eval)  FOTO: 3/3    PTA Visit #: 0/5     Time In: 15:29  Time Out: 16:11   Total Billable Time: 42 minutes (TE only Medicaid)    Subjective     Pt reports: he went to the gym last night. No issues or concerns.   He was compliant with home exercise program.  Response to previous treatment: good  Functional change: ongoing    Pain: 0/10  Location: N/A    OBJECTIVE     Objective Measures updated on 8/13/24.    Treatment     Kenji received the treatments listed below:      therapeutic exercises to develop strength, endurance, ROM, flexibility, posture, and core stabilization for 42 minutes including:    3 x 10 reps supine B LE SLR~ 1.5 # ankle weight added on R side   3 x 10 reps supine glute bridges    Prone:  3 x 30 sec of R quad stretches to increase knee flexion with PT manual facilitation   3 x 10 reps of prone B hamstring curls ~ 3 # ankle weight added on R side    2 x 10 reps STS from mat focusing on coordination/timing between quads and glutes~ pt holds 10 # Tidal Tank with PT CGA    2 x 2 laps around gym of walking while holding 10# Tidal Tank with PT CGA  X1 lap around gym of each UE walking suitcase carry with 10 # kettle bell in hand + PT CGA    X 2 minutes standing and bouncing orange physioball back and forth with PT  "tech, CGA to occasional min A due to post lean    3 x 10 reps standing KB deadlifts with 5# kettle bell (to 4" step to reduce range of motion) + PT CGA      Home Exercises Provided and Patient Education Provided     Education provided:   - home exercise program  -2/16/24: PT provided new exercise for home( SLR). PT also recommended that pt " unlock" his brace at all times( to encourage improved R knee flexion) and encouraged him to ambulate more at home with his RW. Education also provided to pt and mother regarding appropriate brace position.  -3/14/24: PT provided considerable education regarding pt's need to transition out of his current knee brace. PT even pointed to his ortho visit to [facility] on -1/25/24~ recommendation at that time was for pt to begin removing his brace. However, pt continues to wear his hinged knee brace(even during sleep). PT recommended pt work on sit<> stand trials at home without use of brace and to begin sleeping without brace.  -5/30/24: need to loosen brace if it is causing skin breakdown and to allow for some room at each strap; reinforcement of edu re: how to properly don brace  -6/27/24: PT provided a schedule slip for the rest of July.    -7/25/24: PT provided a schedule slip for August.    Written Home Exercises Provided: Patient instructed to cont prior HEP.    See EMR under Patient Instructions for exercises provided 2/6/2024; 2/16/24    Assessment     Joshua continues to progress with strengthening in a standing position, adding KB deadlifts today with a good response and muscular fatigue. He is also improving with dynamic standing balance though tends to have trouble maintaining an even stance width and symmetrical weight bearing. Range of motion at the right knee appears to progressing as appropriate.     Kenji Is progressing well towards his goals.   Pt prognosis is Good.     Pt will continue to benefit from skilled outpatient physical therapy to address the deficits listed in " the problem list box on initial evaluation, provide pt/family education and to maximize pt's level of independence in the home and community environment.     Pt's spiritual, cultural and educational needs considered and pt agreeable to plan of care and goals.     Anticipated barriers to physical therapy: co-morbidities    Goals:  Short Term Goals: 4 weeks   Patient will be independent with home exercise program. MET 3/28/2024  Patient will be able to tolerate standing for at least 5 minutes with LRAD in order to improve function at home. MET 3/28/2024 (with RW)  Patient will be able to ambulate at least 50 feet with LRAD and CGA only in order to improve his independence at home. MET 3/28/2024  Patient will improve R quad strength to at least 4-/5 in order to improve his ability to tolerate standing. MET 3/28/2024  Patient will be able to complete TUG, , MCTSIB, and  testing and set appropriate long term goals. MET 5/21/2024     Long Term Goals: 8 weeks   Patient will improve FOTO limitation to at least 39% in order to demonstrate improved perception of his abilities. ongoing  Patient will be able to tolerate 5 minutes of standing balance without AD in order to improve function at home. Progressing 7/11/24  Patient will be able to ambulate at least 300' with LRAD or no AD at [facility] in order to return to job at [employer]. MET 4/25/24  Patient will be able to improve R knee flexion to at least 110 degrees in order to demonstrate normal ROM in this extremity. MET 8/13/24  Pt will improve 5x chair rise score to </= 18s without UE assist for improved muscular endurance. MET 7/11/24  Pt to perform 2 minute walk test for 350 feet or greater to improve gait speed & endurance. Ongoing 8/13/24  Pt will improve postural control with MCTSIB condition 1 score of at least 30s for decreased fall risk with standing ADLs. Ongoing  Pt will improve postural control with MCTSIB condition 2 score of at least 12s for decreased  fall risk with standing ADLs. Ongoing       Plan     Continue to work on ambulation and stair negotiation as well as ROM of the R LE to the patient's tolerance. Focus more on standing balance and glute strength.     Laly Fagan, PT   8/27/2024

## 2024-08-27 ENCOUNTER — CLINICAL SUPPORT (OUTPATIENT)
Dept: REHABILITATION | Facility: HOSPITAL | Age: 19
End: 2024-08-27
Payer: MEDICAID

## 2024-08-27 DIAGNOSIS — Z74.09 IMPAIRED FUNCTIONAL MOBILITY, BALANCE, GAIT, AND ENDURANCE: Primary | ICD-10-CM

## 2024-08-27 DIAGNOSIS — R26.89 IMPAIRED WEIGHT BEARING: ICD-10-CM

## 2024-08-27 DIAGNOSIS — R29.898 DECREASED STRENGTH INVOLVING KNEE JOINT: ICD-10-CM

## 2024-08-27 PROCEDURE — 97110 THERAPEUTIC EXERCISES: CPT | Mod: PO

## 2024-08-29 ENCOUNTER — CLINICAL SUPPORT (OUTPATIENT)
Dept: REHABILITATION | Facility: HOSPITAL | Age: 19
End: 2024-08-29
Payer: MEDICAID

## 2024-08-29 DIAGNOSIS — Z74.09 IMPAIRED FUNCTIONAL MOBILITY, BALANCE, GAIT, AND ENDURANCE: Primary | ICD-10-CM

## 2024-08-29 DIAGNOSIS — R26.89 IMPAIRED WEIGHT BEARING: ICD-10-CM

## 2024-08-29 DIAGNOSIS — R29.898 DECREASED STRENGTH INVOLVING KNEE JOINT: ICD-10-CM

## 2024-08-29 PROCEDURE — 97110 THERAPEUTIC EXERCISES: CPT | Mod: PO

## 2024-08-29 NOTE — PROGRESS NOTES
"  [facility]   Physical Therapy Treatment Note    Name: Kenji Malone  Clinic Number: [id number]    Therapy Diagnosis:   Encounter Diagnoses   Name Primary?    Impaired functional mobility, balance, gait, and endurance Yes    Decreased strength involving knee joint     Impaired weight bearing          Physician: Order, Paper    Visit Date: 8/29/2024    Physician who did surgery: Mauricio Jaimes MD     Physician Orders: PT Eval and Treat   Medical Diagnosis from Referral: Postoperative state  Evaluation Date: 2/1/2024  Authorization Period Expiration: 12/31/2024  Plan of Care Expiration: 9/24/2024  Progress Note Due: 9/13/2024  Visit # / Visits authorized: 44/50 (+eval)  FOTO: 3/3    PTA Visit #: 0/5     Time In: 15:31  Time Out: 16:13   Total Billable Time: 42 minutes (TE only Medicaid)    Subjective     Pt reports: he went to the gym last night. No issues or concerns.   He was compliant with home exercise program.  Response to previous treatment: good  Functional change: ongoing    Pain: 0/10  Location: N/A    OBJECTIVE     Objective Measures updated on 8/13/24.    Treatment     Kenji received the treatments listed below:      therapeutic exercises to develop strength, endurance, ROM, flexibility, posture, and core stabilization for 42 minutes including:    3 x 10 reps supine B LE SLR~ 1.5 # ankle weight added on R side   3 x 10 reps supine glute bridges    Prone:  3 x 30 sec of R quad stretches to increase knee flexion with PT manual facilitation   3 x 10 reps of prone B hamstring curls ~ 3 # ankle weight added on R side    2 x 10 reps STS from mat focusing on coordination/timing between quads and glutes~ pt holds 10 # Tidal Tank with PT SBA-CGA  2 x 10 reps standing KB deadlifts with 5# kettle bell (to 6" step to reduce range of motion) + PT CGA    Standing with PT CGA + bending down to  bean bags and then tossing to bucket placed ~10 ft away (2 trials, 12 bean bags)  - several " "bouts of knees buckling, requiring seated rest breaks and PT min-mod A to recover      2 x 2 laps around gym of walking while holding 10# Tidal Tank with PT CGA    2 x 8 reps hip/trunk extension prone over large wedge with PT holding BLE in place     Home Exercises Provided and Patient Education Provided     Education provided:   - home exercise program  -2/16/24: PT provided new exercise for home( SLR). PT also recommended that pt " unlock" his brace at all times( to encourage improved R knee flexion) and encouraged him to ambulate more at home with his RW. Education also provided to pt and mother regarding appropriate brace position.  -3/14/24: PT provided considerable education regarding pt's need to transition out of his current knee brace. PT even pointed to his ortho visit to [facility] on -1/25/24~ recommendation at that time was for pt to begin removing his brace. However, pt continues to wear his hinged knee brace(even during sleep). PT recommended pt work on sit<> stand trials at home without use of brace and to begin sleeping without brace.  -5/30/24: need to loosen brace if it is causing skin breakdown and to allow for some room at each strap; reinforcement of edu re: how to properly don brace  -6/27/24: PT provided a schedule slip for the rest of July.    -7/25/24: PT provided a schedule slip for August.    Written Home Exercises Provided: Patient instructed to cont prior HEP.    See EMR under Patient Instructions for exercises provided 2/6/2024; 2/16/24    Assessment     Joshua continues to make progress with right lower extremity strength and range of motion but remains limited functionally by glute/hamstring strength bilaterally. He had significant difficulty using glutes when bending down to  items, over-activating quads and easily fatiguing with repetition. Added a new exercise to target posterior chain which he responded well to but did demonstrate notable weakness as he had trouble getting full " range of motion with the exercise. He remains appropriate for neuro PT for rest of plan of care.     Kenji Is progressing well towards his goals.   Pt prognosis is Good.     Pt will continue to benefit from skilled outpatient physical therapy to address the deficits listed in the problem list box on initial evaluation, provide pt/family education and to maximize pt's level of independence in the home and community environment.     Pt's spiritual, cultural and educational needs considered and pt agreeable to plan of care and goals.     Anticipated barriers to physical therapy: co-morbidities    Goals:  Short Term Goals: 4 weeks   Patient will be independent with home exercise program. MET 3/28/2024  Patient will be able to tolerate standing for at least 5 minutes with LRAD in order to improve function at home. MET 3/28/2024 (with RW)  Patient will be able to ambulate at least 50 feet with LRAD and CGA only in order to improve his independence at home. MET 3/28/2024  Patient will improve R quad strength to at least 4-/5 in order to improve his ability to tolerate standing. MET 3/28/2024  Patient will be able to complete TUG, , MCTSIB, and  testing and set appropriate long term goals. MET 5/21/2024     Long Term Goals: 8 weeks   Patient will improve FOTO limitation to at least 39% in order to demonstrate improved perception of his abilities. ongoing  Patient will be able to tolerate 5 minutes of standing balance without AD in order to improve function at home. Progressing 7/11/24  Patient will be able to ambulate at least 300' with LRAD or no AD at [facility] in order to return to job at [employer]. MET 4/25/24  Patient will be able to improve R knee flexion to at least 110 degrees in order to demonstrate normal ROM in this extremity. MET 8/13/24  Pt will improve 5x chair rise score to </= 18s without UE assist for improved muscular endurance. MET 7/11/24  Pt to perform 2 minute walk test for 350 feet or  greater to improve gait speed & endurance. Ongoing 8/13/24  Pt will improve postural control with MCTSIB condition 1 score of at least 30s for decreased fall risk with standing ADLs. Ongoing  Pt will improve postural control with MCTSIB condition 2 score of at least 12s for decreased fall risk with standing ADLs. Ongoing       Plan     Continue to work on ambulation and stair negotiation as well as ROM of the R LE to the patient's tolerance. Focus more on standing balance and glute strength.     Laly Fagan, PT   8/29/2024

## 2024-09-03 ENCOUNTER — CLINICAL SUPPORT (OUTPATIENT)
Dept: REHABILITATION | Facility: HOSPITAL | Age: 19
End: 2024-09-03
Payer: MEDICAID

## 2024-09-03 DIAGNOSIS — Z74.09 IMPAIRED FUNCTIONAL MOBILITY, BALANCE, GAIT, AND ENDURANCE: Primary | ICD-10-CM

## 2024-09-03 DIAGNOSIS — R26.89 IMPAIRED WEIGHT BEARING: ICD-10-CM

## 2024-09-03 DIAGNOSIS — R29.898 DECREASED STRENGTH INVOLVING KNEE JOINT: ICD-10-CM

## 2024-09-03 PROCEDURE — 97110 THERAPEUTIC EXERCISES: CPT | Mod: PO,CQ

## 2024-09-03 NOTE — PROGRESS NOTES
"  [facility]   Physical Therapy Treatment Note    Name: Kenji Malone  Clinic Number: [id number]    Therapy Diagnosis:   Encounter Diagnoses   Name Primary?    Impaired functional mobility, balance, gait, and endurance Yes    Decreased strength involving knee joint     Impaired weight bearing          Physician: Order, Paper    Visit Date: 9/3/2024    Physician who did surgery: Mauricio Jaimes MD     Physician Orders: PT Eval and Treat   Medical Diagnosis from Referral: Postoperative state  Evaluation Date: 2/1/2024  Authorization Period Expiration: 12/31/2024  Plan of Care Expiration: 9/24/2024  Progress Note Due: 9/13/2024  Visit # / Visits authorized: 45/50 (+eval)  FOTO: 3/3    PTA Visit #: 1/5     Time In: 1700  Time Out: 1745  Total Billable Time: 45 minutes (TE only Medicaid)    Subjective     Pt reports:   He was compliant with home exercise program.  Response to previous treatment: good  Functional change: ongoing    Pain: 0/10  Location: N/A    OBJECTIVE     Objective Measures updated on 8/13/24.    Treatment     Kenji received the treatments listed below:      therapeutic exercises to develop strength, endurance, ROM, flexibility, posture, and core stabilization for 45 minutes including:    3 x 10 reps supine B LE SLR~ 1.5 # ankle weight added on R side   3 x 10 reps supine glute bridges    Prone:  3 x 30 sec of R quad stretches to increase knee flexion with PT manual facilitation   3 x 10 reps of prone B hamstring curls ~ 3 # ankle weight added on R side    2 x 10 reps STS from mat focusing on coordination/timing between quads and glutes~ pt holds 10 # Tidal Tank with PTA SBA-CGA  2 x 10 reps standing KB deadlifts with 5# kettle bell (to 6" step to reduce range of motion) + PTA CGA/SBA    Standing with PTA CGA + bending down to  bean bags and then tossing to bucket placed ~10 ft away (2 trials, 12 bean bags)  - several bouts of knees buckling, requiring seated rest " "breaks and PTA min-mod A to recover      2 x 2 laps around gym of walking while holding 10# Tidal Tank with PTA CGA/SBA    2 x 10 reps hip/trunk extension prone over large wedge with PTA holding BLE in place     Home Exercises Provided and Patient Education Provided     Education provided:   - home exercise program  -2/16/24: PT provided new exercise for home( SLR). PT also recommended that pt " unlock" his brace at all times( to encourage improved R knee flexion) and encouraged him to ambulate more at home with his RW. Education also provided to pt and mother regarding appropriate brace position.  -3/14/24: PT provided considerable education regarding pt's need to transition out of his current knee brace. PT even pointed to his ortho visit to [facility] on -1/25/24~ recommendation at that time was for pt to begin removing his brace. However, pt continues to wear his hinged knee brace(even during sleep). PT recommended pt work on sit<> stand trials at home without use of brace and to begin sleeping without brace.  -5/30/24: need to loosen brace if it is causing skin breakdown and to allow for some room at each strap; reinforcement of edu re: how to properly don brace  -6/27/24: PT provided a schedule slip for the rest of July.    -7/25/24: PT provided a schedule slip for August.    Written Home Exercises Provided: Patient instructed to cont prior HEP.    See EMR under Patient Instructions for exercises provided 2/6/2024; 2/16/24    Assessment     Joshua tolerated his tx session well and cont to focus on leg and glut strength.  Joshua was able to increase his reps on trunk rotation and required slightly less assistance with gait with the tidal tank and squatting over to  bean bags off the floor.  He remains appropriate for neuro PT for rest of plan of care.     Kenji Is progressing well towards his goals.   Pt prognosis is Good.     Pt will continue to benefit from skilled outpatient physical therapy to address the " deficits listed in the problem list box on initial evaluation, provide pt/family education and to maximize pt's level of independence in the home and community environment.     Pt's spiritual, cultural and educational needs considered and pt agreeable to plan of care and goals.     Anticipated barriers to physical therapy: co-morbidities    Goals:  Short Term Goals: 4 weeks   Patient will be independent with home exercise program. MET 3/28/2024  Patient will be able to tolerate standing for at least 5 minutes with LRAD in order to improve function at home. MET 3/28/2024 (with RW)  Patient will be able to ambulate at least 50 feet with LRAD and CGA only in order to improve his independence at home. MET 3/28/2024  Patient will improve R quad strength to at least 4-/5 in order to improve his ability to tolerate standing. MET 3/28/2024  Patient will be able to complete TUG, , MCTSIB, and  testing and set appropriate long term goals. MET 5/21/2024     Long Term Goals: 8 weeks   Patient will improve FOTO limitation to at least 39% in order to demonstrate improved perception of his abilities. ongoing  Patient will be able to tolerate 5 minutes of standing balance without AD in order to improve function at home. Progressing 7/11/24  Patient will be able to ambulate at least 300' with LRAD or no AD at [facility] in order to return to job at [employer]. MET 4/25/24  Patient will be able to improve R knee flexion to at least 110 degrees in order to demonstrate normal ROM in this extremity. MET 8/13/24  Pt will improve 5x chair rise score to </= 18s without UE assist for improved muscular endurance. MET 7/11/24  Pt to perform 2 minute walk test for 350 feet or greater to improve gait speed & endurance. Ongoing 8/13/24  Pt will improve postural control with MCTSIB condition 1 score of at least 30s for decreased fall risk with standing ADLs. Ongoing  Pt will improve postural control with MCTSIB condition 2 score of at  least 12s for decreased fall risk with standing ADLs. Ongoing       Plan     Continue to work on ambulation and stair negotiation as well as ROM of the R LE to the patient's tolerance. Focus more on standing balance and glute strength.     Marian Mercado, PTA   9/3/2024

## 2024-09-05 NOTE — PROGRESS NOTES
"  [facility]   Physical Therapy Treatment Note    Name: Kenji Malone  Clinic Number: [id number]    Therapy Diagnosis:   Encounter Diagnoses   Name Primary?    Impaired functional mobility, balance, gait, and endurance Yes    Decreased strength involving knee joint     Impaired weight bearing            Physician: Order, Paper    Visit Date: 9/6/2024    Physician who did surgery: Mauricio Jaimes MD     Physician Orders: PT Eval and Treat   Medical Diagnosis from Referral: Postoperative state  Evaluation Date: 2/1/2024  Authorization Period Expiration: 12/31/2024  Plan of Care Expiration: 9/24/2024  Progress Note Due: 9/13/2024  Visit # / Visits authorized: 46/50 (+eval)  FOTO: 3/3    PTA Visit #: 0/5     Time In: 1433~ pt spent the first 3 minutes in restroom  Time Out: 1515    Total Billable Time: 42  minutes (TE only Medicaid)    Subjective     Pt reports: he arrived early today due to the rainy weather.   He was compliant with home exercise program.  Response to previous treatment: good  Functional change: ongoing    Pain: 0/10  Location: N/A    OBJECTIVE     Objective Measures updated on 8/13/24.    Treatment     Kenji received the treatments listed below:      therapeutic exercises to develop strength, endurance, ROM, flexibility, posture, and core stabilization for 42 minutes including:    3 x 10 reps supine B LE SLR~ 1.5 # ankle weight added on R side   3 x 10 reps supine glute bridges    Prone:  3 x 30 sec of R quad stretches to increase knee flexion with PT manual facilitation   3 x 10 reps of prone B hamstring curls ~ 3 # ankle weight added on R side    2 x 10 reps sit<> stand from mat focusing on coordination/timing between quads and glutes~ pt holds 10 # Tidal Tank Ball with PT providing CGA  2 x 10 reps standing deadlifts with 10 # kettle bell (to 6" step to reduce range of motion) + PT CGA to occasional slight min A    X 2 minutes of standing and bouncing orange physioball " "back and forth with PT tech, CGA to occasional slight min A due to posterior lean    2 x 1 lap around gym of walking while holding 10# Tidal Tank Ball with PT providing CGA to slight min A when pt carrying in L hand, CGA when carrying in R hand    2 x 10 reps hip/trunk extension prone over large wedge with PT holding hips in place     1 x 10 sit<> stands from mat after orange physioball rolled to him by PT tech and pt stands to bounce back, CGA  1 x 10 squats near mat~ pt bends down to  orange physioball rolled to him by PT tech and pt bounces back~ CGA to occasional slight min A    Pt ascends and descends 12 six inch steps with 1 rail assist, non-reciprocal pattern, CGA    Home Exercises Provided and Patient Education Provided     Education provided:   - home exercise program  -2/16/24: PT provided new exercise for home( SLR). PT also recommended that pt " unlock" his brace at all times( to encourage improved R knee flexion) and encouraged him to ambulate more at home with his RW. Education also provided to pt and mother regarding appropriate brace position.  -3/14/24: PT provided considerable education regarding pt's need to transition out of his current knee brace. PT even pointed to his ortho visit to [facility] on -1/25/24~ recommendation at that time was for pt to begin removing his brace. However, pt continues to wear his hinged knee brace(even during sleep). PT recommended pt work on sit<> stand trials at home without use of brace and to begin sleeping without brace.  -5/30/24: need to loosen brace if it is causing skin breakdown and to allow for some room at each strap; reinforcement of edu re: how to properly don brace  -6/27/24: PT provided a schedule slip for the rest of July.    -7/25/24: PT provided a schedule slip for August.    Written Home Exercises Provided: Patient instructed to cont prior HEP.    See EMR under Patient Instructions for exercises provided 2/6/2024; 2/16/24    Assessment     Joshua " continues to make progress with right lower extremity strength and range of motion but remains limited functionally by glute/hamstring weakness bilaterally. He continues to demonstrate tendency to experience slight losses of balance in posterior direction when squatting down or bouncing orange physioball. He also occasionally has to use a L LE stepping strategy to regain his balance. R knee still tends to hyperextend mildly during sit<> stand tasks. Pt did well with all mat exercises but struggles more with L LE during SLR. He remains appropriate for neuro PT for rest of plan of care.     Kenji Is progressing well towards his goals.   Pt prognosis is Good.     Pt will continue to benefit from skilled outpatient physical therapy to address the deficits listed in the problem list box on initial evaluation, provide pt/family education and to maximize pt's level of independence in the home and community environment.     Pt's spiritual, cultural and educational needs considered and pt agreeable to plan of care and goals.     Anticipated barriers to physical therapy: co-morbidities    Goals:  Short Term Goals: 4 weeks   Patient will be independent with home exercise program. MET 3/28/2024  Patient will be able to tolerate standing for at least 5 minutes with LRAD in order to improve function at home. MET 3/28/2024 (with RW)  Patient will be able to ambulate at least 50 feet with LRAD and CGA only in order to improve his independence at home. MET 3/28/2024  Patient will improve R quad strength to at least 4-/5 in order to improve his ability to tolerate standing. MET 3/28/2024  Patient will be able to complete TUG, , MCTSIB, and  testing and set appropriate long term goals. MET 5/21/2024     Long Term Goals: 8 weeks   Patient will improve FOTO limitation to at least 39% in order to demonstrate improved perception of his abilities. ongoing  Patient will be able to tolerate 5 minutes of standing balance without AD in order  to improve function at home. Progressing 7/11/24  Patient will be able to ambulate at least 300' with LRAD or no AD at [facility] in order to return to job at [employer]. MET 4/25/24  Patient will be able to improve R knee flexion to at least 110 degrees in order to demonstrate normal ROM in this extremity. MET 8/13/24  Pt will improve 5x chair rise score to </= 18s without UE assist for improved muscular endurance. MET 7/11/24  Pt to perform 2 minute walk test for 350 feet or greater to improve gait speed & endurance. Ongoing 8/13/24  Pt will improve postural control with MCTSIB condition 1 score of at least 30s for decreased fall risk with standing ADLs. Ongoing  Pt will improve postural control with MCTSIB condition 2 score of at least 12s for decreased fall risk with standing ADLs. Ongoing       Plan     Continue to work on ambulation and stair negotiation as well as ROM of the R LE to the patient's tolerance. Focus more on standing balance and glute strength.     David Frankel, PT   9/6/2024

## 2024-09-06 ENCOUNTER — CLINICAL SUPPORT (OUTPATIENT)
Dept: REHABILITATION | Facility: HOSPITAL | Age: 19
End: 2024-09-06
Payer: MEDICAID

## 2024-09-06 DIAGNOSIS — R29.898 DECREASED STRENGTH INVOLVING KNEE JOINT: ICD-10-CM

## 2024-09-06 DIAGNOSIS — R26.89 IMPAIRED WEIGHT BEARING: ICD-10-CM

## 2024-09-06 DIAGNOSIS — Z74.09 IMPAIRED FUNCTIONAL MOBILITY, BALANCE, GAIT, AND ENDURANCE: Primary | ICD-10-CM

## 2024-09-06 PROCEDURE — 97110 THERAPEUTIC EXERCISES: CPT | Mod: PO

## 2024-09-12 ENCOUNTER — DOCUMENTATION ONLY (OUTPATIENT)
Dept: REHABILITATION | Facility: HOSPITAL | Age: 19
End: 2024-09-12
Payer: MEDICAID

## 2024-09-12 NOTE — PROGRESS NOTES
[facility]   Physical Therapy Treatment Note    Name: Kenji Malone  Clinic Number: [id number]    Therapy Diagnosis:   Encounter Diagnoses   Name Primary?    Impaired functional mobility, balance, gait, and endurance Yes    Decreased strength involving knee joint     Impaired weight bearing              Physician: Order, Paper    Visit Date: 9/13/2024    Physician who did surgery: Mauricio Jaimes MD     Physician Orders: PT Eval and Treat   Medical Diagnosis from Referral: Postoperative state  Evaluation Date: 2/1/2024  Authorization Period Expiration: 12/31/2024  Plan of Care Expiration: 9/24/2024  Progress Note Due: 9/13/2024  Visit # / Visits authorized: 47/50 (+eval)  FOTO: 3/3    PTA Visit #: 0/5     Time In: 1435~  pt arrived late and needed to use restroom  Time Out: 1515     Total Billable Time: 40   minutes (TE only Medicaid)    Subjective     Pt reports: no complaints today.  He was compliant with home exercise program.  Response to previous treatment: good  Functional change: ongoing    Pain: 0/10  Location: N/A    OBJECTIVE     Objective Measures updated on 8/13/24.    Treatment     Kenji received the treatments listed below:      therapeutic exercises to develop strength, endurance, ROM, flexibility, posture, and core stabilization for 40 minutes including:    3 x 10 reps supine B LE SLR~ 2 # ankle weight added on R side~ some assist to L LE to maintain better knee extension  3 x 10 reps supine glute bridges    Prone:  3 x 30 sec of R quad stretches to increase knee flexion with PT manual facilitation   3 x 10 reps of prone B hamstring curls ~ 4 # ankle weight added on R side~ slight assistance provided during last 3 reps of third set on R    3 x 10 reps sit<> stand from mat focusing on coordination/timing between quads and glutes~ pt holds 8.8 # weighted ball with PT providing CGA to occasional min A due to knee instability  2 x 10 reps standing deadlifts with 10 # kettle  "bell (to 6" step to reduce range of motion) + PT CGA to min A~ increased assistance near the end of his 2nd set due to quad weakness  1 x 10 reps standing deadlifts with 5 # kettle bell (to 6" step to reduce range of motion) + PT CGA to slight min A    X 1 minute standing and bouncing basketball back and forth with PT tech, CGA  X 1 minute standing and tossing basketball to PT tech, CGA to slight min A      2 x 2 laps around gym of walking while holding 10# kettle bell with PT providing CGA with pt holding bell in R hand, CGA to slight min A with pt holding in L hand       1 x 10 stand> squat> stand + lifting orange physioball up from floor and raising overhead, CGA to more min A to prevent knee buckling as pt fatigues      Home Exercises Provided and Patient Education Provided     Education provided:   - home exercise program  -2/16/24: PT provided new exercise for home( SLR). PT also recommended that pt " unlock" his brace at all times( to encourage improved R knee flexion) and encouraged him to ambulate more at home with his RW. Education also provided to pt and mother regarding appropriate brace position.  -3/14/24: PT provided considerable education regarding pt's need to transition out of his current knee brace. PT even pointed to his ortho visit to [facility] on -1/25/24~ recommendation at that time was for pt to begin removing his brace. However, pt continues to wear his hinged knee brace(even during sleep). PT recommended pt work on sit<> stand trials at home without use of brace and to begin sleeping without brace.  -5/30/24: need to loosen brace if it is causing skin breakdown and to allow for some room at each strap; reinforcement of edu re: how to properly don brace  -6/27/24: PT provided a schedule slip for the rest of July.    -7/25/24: PT provided a schedule slip for August.    Written Home Exercises Provided: Patient instructed to cont prior HEP.    See EMR under Patient Instructions for exercises " provided 2/6/2024; 2/16/24    Assessment     Joshua continues to work hard to strengthen his quads and glutes. He demonstrated fatigue with some activities today, which generally resulted in knee buckling. However, he did not need to employ stepping strategy with L LE as frequently as he did the prior session. PT increased weight to R LE during SLR and hamstring curls. Joshua performed all 30 reps of R SLR without assistance, but fatigue to hamstring group resulted in PT helping him complete his final 3 reps of hamstring curls. He also struggled more today when performing deadlifts with 10 # kettle bell, so PT had pt perform one set with 5 # kettle bell. Stand> squat> stand activity + picking up orange physioball to lift overhead was a good challenge. Joshua remains appropriate for neuro PT for rest of his current plan of care. His primary therapist will perform a reassessment at his next visit on 9/17/24.    Kenji Is progressing well towards his goals.   Pt prognosis is Good.     Pt will continue to benefit from skilled outpatient physical therapy to address the deficits listed in the problem list box on initial evaluation, provide pt/family education and to maximize pt's level of independence in the home and community environment.     Pt's spiritual, cultural and educational needs considered and pt agreeable to plan of care and goals.     Anticipated barriers to physical therapy: co-morbidities    Goals:  Short Term Goals: 4 weeks   Patient will be independent with home exercise program. MET 3/28/2024  Patient will be able to tolerate standing for at least 5 minutes with LRAD in order to improve function at home. MET 3/28/2024 (with RW)  Patient will be able to ambulate at least 50 feet with LRAD and CGA only in order to improve his independence at home. MET 3/28/2024  Patient will improve R quad strength to at least 4-/5 in order to improve his ability to tolerate standing. MET 3/28/2024  Patient will be able to  complete TUG, , MCTSIB, and  testing and set appropriate long term goals. MET 5/21/2024     Long Term Goals: 8 weeks   Patient will improve FOTO limitation to at least 39% in order to demonstrate improved perception of his abilities. ongoing  Patient will be able to tolerate 5 minutes of standing balance without AD in order to improve function at home. Progressing 7/11/24  Patient will be able to ambulate at least 300' with LRAD or no AD at [facility] in order to return to job at [employer]. MET 4/25/24  Patient will be able to improve R knee flexion to at least 110 degrees in order to demonstrate normal ROM in this extremity. MET 8/13/24  Pt will improve 5x chair rise score to </= 18s without UE assist for improved muscular endurance. MET 7/11/24  Pt to perform 2 minute walk test for 350 feet or greater to improve gait speed & endurance. Ongoing 8/13/24  Pt will improve postural control with MCTSIB condition 1 score of at least 30s for decreased fall risk with standing ADLs. Ongoing  Pt will improve postural control with MCTSIB condition 2 score of at least 12s for decreased fall risk with standing ADLs. Ongoing       Plan     Next session: primary therapist to perform reassessment of pt's progress.    Continue to work on ambulation and stair negotiation as well as ROM of the R LE to the patient's tolerance. Focus more on standing balance and glute strength.     David Frankel, PT   9/13/2024

## 2024-09-12 NOTE — PROGRESS NOTES
PT/PTA met face to face to discuss pt's treatment plan and progress towards established goals.  Continue with current PT POC with focus on gait with out A.D, stretching, strengthening and balance.  Patient will be seen by physical therapist at least every sixth treatment or 30 days, whichever occurs first.    Marian Mercado, PTA  09/12/2024

## 2024-09-13 ENCOUNTER — CLINICAL SUPPORT (OUTPATIENT)
Dept: REHABILITATION | Facility: HOSPITAL | Age: 19
End: 2024-09-13
Payer: MEDICAID

## 2024-09-13 DIAGNOSIS — R29.898 DECREASED STRENGTH INVOLVING KNEE JOINT: ICD-10-CM

## 2024-09-13 DIAGNOSIS — Z74.09 IMPAIRED FUNCTIONAL MOBILITY, BALANCE, GAIT, AND ENDURANCE: Primary | ICD-10-CM

## 2024-09-13 DIAGNOSIS — R26.89 IMPAIRED WEIGHT BEARING: ICD-10-CM

## 2024-09-13 PROCEDURE — 97110 THERAPEUTIC EXERCISES: CPT | Mod: PO

## 2024-09-17 ENCOUNTER — CLINICAL SUPPORT (OUTPATIENT)
Dept: REHABILITATION | Facility: HOSPITAL | Age: 19
End: 2024-09-17
Payer: MEDICAID

## 2024-09-17 DIAGNOSIS — R29.898 DECREASED STRENGTH INVOLVING KNEE JOINT: ICD-10-CM

## 2024-09-17 DIAGNOSIS — R26.89 IMPAIRED WEIGHT BEARING: ICD-10-CM

## 2024-09-17 DIAGNOSIS — Z74.09 IMPAIRED FUNCTIONAL MOBILITY, BALANCE, GAIT, AND ENDURANCE: Primary | ICD-10-CM

## 2024-09-17 PROCEDURE — 97110 THERAPEUTIC EXERCISES: CPT | Mod: PO

## 2024-09-17 NOTE — PROGRESS NOTES
[facility]   Physical Therapy Treatment Note / Re-Assessment Note    Name: Kenji Malone  Clinic Number: [id number]    Therapy Diagnosis:   Encounter Diagnoses   Name Primary?    Impaired functional mobility, balance, gait, and endurance Yes    Decreased strength involving knee joint     Impaired weight bearing        Physician: Order, Paper    Visit Date: 9/17/2024    Physician who did surgery: Mauricio Jaimes MD     Physician Orders: PT Eval and Treat   Medical Diagnosis from Referral: Postoperative state  Evaluation Date: 2/1/2024  Authorization Period Expiration: 12/31/2024  Plan of Care Expiration: 9/24/2024  Progress Note Due: 9/24/2024  Visit # / Visits authorized: 48/50 (+eval)  FOTO: 3/3    PTA Visit #: 0/5     Time In: 16:16  Time Out: 16:57     Total Billable Time: 41 minutes (TE only Medicaid)    Subjective     Pt reports: he is doing well. No new complaints.  He was compliant with home exercise program.  Response to previous treatment: good  Functional change: ongoing    Pain: 0/10  Location: N/A    OBJECTIVE     Objective Measures updated on 9/17/24.    RLE ROM:  Knee flexion = 117 deg     Lower Extremity Strength~ tested in sitting  Right LE 7/11/2024 8/13/2024 9/17/2024 Left LE 7/11/24 8/13/2024 9/17/2024   Hip flexion:  5/5 5/5 5/5 Hip flexion: 5/5 5/5 5/5   Knee extension: 5/5 5/5 5/5 Knee extension: 5/5 5/5 5/5   Knee flexion: 4+/5 5/5 5/5 Knee flexion: 4/5 4/5 4/5   Ankle dorsiflexion:  2-/5 2/5 2/5 Ankle dorsiflexion: 1/5 1/5 1/5   Ankle plantarflexion:  2-/5 2/5 2/5 Ankle plantarflexion: 1/5 1/5 1/5   Hip abduction: 4-/5 4-/5 4-/5 Hip abduction: 4/5 4/5 4/5   Hip adduction: 5/5 5/5 5/5 Hip adduction 5/5 5/5 5/5   Hip extension: 4+/5 5/5 5/5 Hip extension: 5/5 5/5 5/5   *within range of motion     5x STS:   19 seconds with arms  15 seconds without arms      Self-Selected Gait Speed:   Trial 1: 6 meters in 6.5 seconds, 0.92 m/s with RW  Trial 2: 6 meters in 6.5  "seconds, 0.92 m/s without RW     2-Minute Walk Test  AD used: none, PT CGA  Distance: 344 ft     MCTSIB:  Condition 1 = 16 sec  Condition 2 = 3 sec  Condition 3 = 7 sec  Condition 4 = 3 sec    Treatment     Kenji received the treatments listed below:      therapeutic exercises to develop strength, endurance, ROM, flexibility, posture, and core stabilization for 41 minutes including:    3 x 10 reps supine B LE SLR~ 2 # ankle weight added on R side~ some assist to L LE to maintain better knee extension  3 x 10 reps supine glute bridges    Prone:  3 x 30 sec of R quad stretches to increase knee flexion with PT manual facilitation   3 x 10 reps of prone B hamstring curls ~ 4 # ankle weight added on R side    3 x 10 reps sit<> stand from mat focusing on coordination/timing between quads and glutes~ pt holds [place] with PT providing SBA-CGA    NOT PERFORMED:   2 x 10 reps standing deadlifts with 10 # kettle bell (to 6" step to reduce range of motion) + PT CGA to min A~ increased assistance near the end of his 2nd set due to quad weakness  1 x 10 reps standing deadlifts with 5 # kettle bell (to 6" step to reduce range of motion) + PT CGA to slight min A      Home Exercises Provided and Patient Education Provided     Education provided:   - home exercise program  -2/16/24: PT provided new exercise for home( SLR). PT also recommended that pt " unlock" his brace at all times( to encourage improved R knee flexion) and encouraged him to ambulate more at home with his RW. Education also provided to pt and mother regarding appropriate brace position.  -3/14/24: PT provided considerable education regarding pt's need to transition out of his current knee brace. PT even pointed to his ortho visit to [facility] on -1/25/24~ recommendation at that time was for pt to begin removing his brace. However, pt continues to wear his hinged knee brace(even during sleep). PT recommended pt work on sit<> stand trials at home without use of brace " and to begin sleeping without brace.  -5/30/24: need to loosen brace if it is causing skin breakdown and to allow for some room at each strap; reinforcement of edu re: how to properly don brace  -6/27/24: PT provided a schedule slip for the rest of July.    -7/25/24: PT provided a schedule slip for August.    Written Home Exercises Provided: Patient instructed to cont prior HEP.    See EMR under Patient Instructions for exercises provided 2/6/2024; 2/16/24    Assessment     Joshua continues to make great gains with knee range of motion, lower extremity strength, and mobility without use of an assistive device. However, he still demonstrates decreased standing balance as noted by his MCTSIB which places him at an increased risk for falls at this time. Despite this, he is progressing appropriately with unsupported ambulation as well as functional transfers as he is consistently walking without an assistive device in the home. He will likely be ready for discharge next week at end of plan of care.     Kenji Is progressing well towards his goals.   Pt prognosis is Good.     Pt will continue to benefit from skilled outpatient physical therapy to address the deficits listed in the problem list box on initial evaluation, provide pt/family education and to maximize pt's level of independence in the home and community environment.     Pt's spiritual, cultural and educational needs considered and pt agreeable to plan of care and goals.     Anticipated barriers to physical therapy: co-morbidities    Goals:  Short Term Goals: 4 weeks   Patient will be independent with home exercise program. MET 3/28/2024  Patient will be able to tolerate standing for at least 5 minutes with LRAD in order to improve function at home. MET 3/28/2024 (with RW)  Patient will be able to ambulate at least 50 feet with LRAD and CGA only in order to improve his independence at home. MET 3/28/2024  Patient will improve R quad strength to at least 4-/5 in  order to improve his ability to tolerate standing. MET 3/28/2024  Patient will be able to complete TUG, , MCTSIB, and  testing and set appropriate long term goals. MET 5/21/2024     Long Term Goals: 8 weeks   Patient will improve FOTO limitation to at least 39% in order to demonstrate improved perception of his abilities. ongoing  Patient will be able to tolerate 5 minutes of standing balance without AD in order to improve function at home. Progressing 9/17/24  Patient will be able to ambulate at least 300' with LRAD or no AD at [facility] in order to return to job at [employer]. MET 4/25/24  Patient will be able to improve R knee flexion to at least 110 degrees in order to demonstrate normal ROM in this extremity. MET 8/13/24  Pt will improve 5x chair rise score to </= 18s without UE assist for improved muscular endurance. MET 7/11/24  Pt to perform 2 minute walk test for 350 feet or greater to improve gait speed & endurance. Progressing 9/17/24  Pt will improve postural control with MCTSIB condition 1 score of at least 30s for decreased fall risk with standing ADLs. Ongoing  Pt will improve postural control with MCTSIB condition 2 score of at least 12s for decreased fall risk with standing ADLs. Ongoing     Plan     Continue to work on ambulation and stair negotiation as well as ROM of the R LE to the patient's tolerance. Focus more on standing balance and glute strength.     Laly Fagan, PT   9/17/2024

## 2024-09-20 ENCOUNTER — CLINICAL SUPPORT (OUTPATIENT)
Dept: REHABILITATION | Facility: HOSPITAL | Age: 19
End: 2024-09-20
Payer: MEDICAID

## 2024-09-20 DIAGNOSIS — R26.89 IMPAIRED WEIGHT BEARING: ICD-10-CM

## 2024-09-20 DIAGNOSIS — R29.898 DECREASED STRENGTH INVOLVING KNEE JOINT: ICD-10-CM

## 2024-09-20 DIAGNOSIS — Z74.09 IMPAIRED FUNCTIONAL MOBILITY, BALANCE, GAIT, AND ENDURANCE: Primary | ICD-10-CM

## 2024-09-20 PROCEDURE — 97110 THERAPEUTIC EXERCISES: CPT | Mod: PO

## 2024-09-20 NOTE — PROGRESS NOTES
[facility]   Physical Therapy Treatment Note    Name: Kenji Malone  Clinic Number: [id number]    Therapy Diagnosis:   Encounter Diagnoses   Name Primary?    Impaired functional mobility, balance, gait, and endurance Yes    Decreased strength involving knee joint     Impaired weight bearing                Physician: Order, Paper    Visit Date: 9/20/2024    Physician who did surgery: Mauricio Jaimes MD     Physician Orders: PT Eval and Treat   Medical Diagnosis from Referral: Postoperative state  Evaluation Date: 2/1/2024  Authorization Period Expiration: 12/31/2024  Plan of Care Expiration: 9/24/2024  Progress Note Due: 9/13/2024  Visit # / Visits authorized: 49/60 (+eval)  FOTO: 3/3    PTA Visit #: 0/5     Time In: 1435 ~pt uses restroom at beginning of session  Time Out: 1515      Total Billable Time: 40 minutes (TE only Medicaid)    Subjective     Pt reports: no complaints today.  He was compliant with home exercise program.  Response to previous treatment: good  Functional change: ongoing    Pain: 0/10  Location: N/A    OBJECTIVE     Objective Measures updated on 8/13/24.    Treatment     Kenji received the treatments listed below:      therapeutic exercises to develop strength, endurance, ROM, flexibility, posture, and core stabilization for 40 minutes including:    3 x 10 reps supine B LE SLR~ 2 # ankle weight added on R side~ some assist to L LE to maintain better knee extension  3 x 10 reps supine glute bridges    Prone:  3 x 30 sec of R quad stretches to increase knee flexion with PT manual facilitation   3 x 10 reps of prone B hamstring curls ~ 3 # ankle weight added on R side~ assist to complete final 4 reps on L side    2 x 10 reps sit<> stand from mat focusing on coordination/timing between quads and glutes~ pt holds 5 # horseshoe with 5 # ankle weight added( 10 # total) with PT providing CGA to occasional min A due to R knee instability    2 x 10 reps standing deadlifts  "with 5 # kettle bell (to 6" step to reduce range of motion) + PT CGA     X 1 minute standing and bouncing basketball back and forth with PT tech, CGA to one episode of light min A due to knee buckling    2 x 2 laps around gym of walking while holding 10# kettle bell with PT providing CGA~ pt holds in L and R hand    2 x 10 stand> squat> stand + lifting orange physioball up from floor and raising overhead, CGA to occasional min A to prevent knee buckling as pt fatigues~ pt experiences one episode of knee buckling during 2nd set which requires mod A from PT to correct    Horizontal Leg Press:  3 x 10 reps with 40 # applied~ cues to prevent R knee hyperextension      Home Exercises Provided and Patient Education Provided     Education provided:   - home exercise program  -2/16/24: PT provided new exercise for home( SLR). PT also recommended that pt " unlock" his brace at all times( to encourage improved R knee flexion) and encouraged him to ambulate more at home with his RW. Education also provided to pt and mother regarding appropriate brace position.  -3/14/24: PT provided considerable education regarding pt's need to transition out of his current knee brace. PT even pointed to his ortho visit to [facility] on -1/25/24~ recommendation at that time was for pt to begin removing his brace. However, pt continues to wear his hinged knee brace(even during sleep). PT recommended pt work on sit<> stand trials at home without use of brace and to begin sleeping without brace.  -5/30/24: need to loosen brace if it is causing skin breakdown and to allow for some room at each strap; reinforcement of edu re: how to properly don brace  -6/27/24: PT provided a schedule slip for the rest of July.    -7/25/24: PT provided a schedule slip for August.    Written Home Exercises Provided: Patient instructed to cont prior HEP.    See EMR under Patient Instructions for exercises provided 2/6/2024; 2/16/24    Assessment     Joshua tolerated his " session well today and continued some of the previously performed exercises for quad and glute strengthening. PT reduced weight to R LE back to 3 # for hamstring curls and pt was able to complete all 30 reps. He did need help to complete his final 4 reps on L LE. Sit<> stand trials were performed with 10 # weighted horseshoe and pt performed well here. Joshua also demonstrated a bit more stability when walking around gym while holding 10 # kettle bell in L hand. Stand> squat> stand activity + picking up orange physioball to lift overhead remains an appropriate challenge. PT concluded session by having pt work at the horizontal leg press. Joshua remains appropriate for neuro PT for rest of his current plan of care.     Kenji Is progressing well towards his goals.   Pt prognosis is Good.     Pt will continue to benefit from skilled outpatient physical therapy to address the deficits listed in the problem list box on initial evaluation, provide pt/family education and to maximize pt's level of independence in the home and community environment.     Pt's spiritual, cultural and educational needs considered and pt agreeable to plan of care and goals.     Anticipated barriers to physical therapy: co-morbidities    Goals:  Short Term Goals: 4 weeks   Patient will be independent with home exercise program. MET 3/28/2024  Patient will be able to tolerate standing for at least 5 minutes with LRAD in order to improve function at home. MET 3/28/2024 (with RW)  Patient will be able to ambulate at least 50 feet with LRAD and CGA only in order to improve his independence at home. MET 3/28/2024  Patient will improve R quad strength to at least 4-/5 in order to improve his ability to tolerate standing. MET 3/28/2024  Patient will be able to complete TUG, , MCTSIB, and  testing and set appropriate long term goals. MET 5/21/2024     Long Term Goals: 8 weeks   Patient will improve FOTO limitation to at least 39% in order to demonstrate  improved perception of his abilities. ongoing  Patient will be able to tolerate 5 minutes of standing balance without AD in order to improve function at home. Progressing 9/17/24  Patient will be able to ambulate at least 300' with LRAD or no AD at [facility] in order to return to job at [employer]. MET 4/25/24  Patient will be able to improve R knee flexion to at least 110 degrees in order to demonstrate normal ROM in this extremity. MET 8/13/24  Pt will improve 5x chair rise score to </= 18s without UE assist for improved muscular endurance. MET 7/11/24  Pt to perform 2 minute walk test for 350 feet or greater to improve gait speed & endurance. Progressing 9/17/24  Pt will improve postural control with MCTSIB condition 1 score of at least 30s for decreased fall risk with standing ADLs. Ongoing  Pt will improve postural control with MCTSIB condition 2 score of at least 12s for decreased fall risk with standing ADLs. Ongoing         Plan       Continue to work on ambulation and stair negotiation as well as ROM of the R LE to the patient's tolerance. Focus more on standing balance and glute strength.     David Frankel, PT   9/20/2024

## 2024-09-27 ENCOUNTER — CLINICAL SUPPORT (OUTPATIENT)
Dept: REHABILITATION | Facility: HOSPITAL | Age: 19
End: 2024-09-27
Payer: MEDICAID

## 2024-09-27 DIAGNOSIS — Z74.09 IMPAIRED FUNCTIONAL MOBILITY, BALANCE, GAIT, AND ENDURANCE: Primary | ICD-10-CM

## 2024-09-27 DIAGNOSIS — R26.89 IMPAIRED WEIGHT BEARING: ICD-10-CM

## 2024-09-27 DIAGNOSIS — R29.898 DECREASED STRENGTH INVOLVING KNEE JOINT: ICD-10-CM

## 2024-09-27 PROCEDURE — 97110 THERAPEUTIC EXERCISES: CPT | Mod: PO

## 2024-09-27 NOTE — PROGRESS NOTES
[facility]   Physical Therapy Treatment Note / Discharge Summary    Name: Kenji Malone  Clinic Number: [id number]    Therapy Diagnosis:   Encounter Diagnoses   Name Primary?    Impaired functional mobility, balance, gait, and endurance Yes    Decreased strength involving knee joint     Impaired weight bearing        Physician: Order, Paper    Visit Date: 9/27/2024    Physician who did surgery: Mauricio Jaimes MD     Physician Orders: PT Eval and Treat   Medical Diagnosis from Referral: Postoperative state  Evaluation Date: 2/1/2024  Authorization Period Expiration: 12/31/2024  Plan of Care Expiration: 9/24/2024  Progress Note Due: 9/24/2024  Visit # / Visits authorized: 50/60 (+eval)  FOTO: 3/3    PTA Visit #: 0/5     Time In: 1341  Time Out: 1426      Total Billable Time: 45 minutes (TE only Medicaid)    Subjective     Pt reports: he will have his L LE surgery on 11/22/24.  He was compliant with home exercise program.  Response to previous treatment: good  Functional change: ongoing    Pain: 0/10  Location: N/A    OBJECTIVE     RLE ROM:  Knee flexion = 117 deg, 120 deg(AA in sitting)     Lower Extremity Strength~ tested in sitting  Right LE 7/11/2024 8/13/2024 9/17/2024 9/27/24 Left LE 7/11/24 8/13/2024 9/17/2024 9/27/24   Hip flexion:  5/5 5/5 5/5 5/5 Hip flexion: 5/5 5/5 5/5 5/5   Knee extension: 5/5 5/5 5/5 5/5 Knee extension: 5/5 5/5 5/5 5/5   Knee flexion: 4+/5 5/5 5/5 5/5 Knee flexion: 4/5 4/5 4/5 3+/5   Ankle dorsiflexion:  2-/5 2/5 2/5 2/5 Ankle dorsiflexion: 1/5 1/5 1/5 1/5   Ankle plantarflexion:  2-/5 2/5 2/5 2-/5 Ankle plantarflexion: 1/5 1/5 1/5 1/5     Hip abduction: 4-/5 4-/5 4-/5 4-/5 Hip abduction: 4/5 4/5 4/5 3+/5*   Hip adduction: 5/5 5/5 5/5 5/5 Hip adduction 5/5 5/5 5/5 5/5   Hip extension: 4+/5 5/5 5/5 5/5 Hip extension: 5/5 5/5 5/5 5/5   *within ROM     5x STS:   19 seconds with arms, 10 seconds  15 seconds without arms, 11 seconds      Self-Selected Gait Speed:  "  Trial 1: 6 meters in 7 seconds, 0.86 m/s with RW  Trial 2: 6 meters in 7 seconds, 0.86 m/s without RW     2-Minute Walk Test  AD used: none, PT CGA  Distance: 322 ft     MCTSIB:  Condition 1 = 16 sec, 30 seconds  Condition 2 = 3 sec, 7 seconds  Condition 3 = 7 sec, 12 seconds  Condition 4 = 3 sec, 4 seconds        Treatment     Kenji received the treatments listed below:      therapeutic exercises to develop strength, endurance, ROM, flexibility, posture, and core stabilization for 45 minutes including:    (Includes time for objective testing and completion of FOTO survey).    3 x 10 reps supine B LE SLR~ 2 # ankle weight added on R side~ some assist to L LE to maintain better knee extension  3 x 10 reps supine glute bridges    Prone:  3 x 30 sec of R quad stretches to increase knee flexion with PT manual facilitation   3 x 10 reps of prone B hamstring curls ~ 3 # ankle weight added on R side      Home Exercises Provided and Patient Education Provided     Education provided:   - home exercise program  -2/16/24: PT provided new exercise for home( SLR). PT also recommended that pt " unlock" his brace at all times( to encourage improved R knee flexion) and encouraged him to ambulate more at home with his RW. Education also provided to pt and mother regarding appropriate brace position.  -3/14/24: PT provided considerable education regarding pt's need to transition out of his current knee brace. PT even pointed to his ortho visit to [facility] on -1/25/24~ recommendation at that time was for pt to begin removing his brace. However, pt continues to wear his hinged knee brace(even during sleep). PT recommended pt work on sit<> stand trials at home without use of brace and to begin sleeping without brace.  -5/30/24: need to loosen brace if it is causing skin breakdown and to allow for some room at each strap; reinforcement of edu re: how to properly don brace  -6/27/24: PT provided a schedule slip for the rest of July.  "   -7/25/24: PT provided a schedule slip for August.    Written Home Exercises Provided: Patient instructed to cont prior HEP.    See EMR under Patient Instructions for exercises provided 2/6/2024; 2/16/24    PHYSICAL THERAPY DISCHARGE SUMMARY     Status Towards Goals Met:     Joshua demonstrated very good progress during this therapy episode( which included a total of 51 visits). He has done well to improve his R knee ROM and strength following an osteotomy procedure last November. He will undergo another osteotomy to his L LE in November of this year. He remains limited by decreased LE strength in selected areas, especially during performance of functional tasks such as lifting, squatting and bending. Many of the updated measurements today are recorded in red, to demonstrate the change from his most recent reassessment on 9/17/24. Pt's primary PT feels that Joshua is now ready for discharge. He has been instructed to continue working on his HEP, to remain active with regard to walking and to continue going to the gym. Joshua met all short term goals and 5 of 8 long term goals.    Goals:  Short Term Goals: 4 weeks   Patient will be independent with home exercise program. MET 3/28/2024  Patient will be able to tolerate standing for at least 5 minutes with LRAD in order to improve function at home. MET 3/28/2024 (with RW)  Patient will be able to ambulate at least 50 feet with LRAD and CGA only in order to improve his independence at home. MET 3/28/2024  Patient will improve R quad strength to at least 4-/5 in order to improve his ability to tolerate standing. MET 3/28/2024  Patient will be able to complete TUG, , MCTSIB, and  testing and set appropriate long term goals. MET 5/21/2024     Long Term Goals: 8 weeks   Patient will improve FOTO limitation to at least 39% in order to demonstrate improved perception of his abilities. MET, 9/27/24  Patient will be able to tolerate 5 minutes of standing balance without AD in order  to improve function at home. Progressing 9/17/24, NOT reassessed, 9/27/24  Patient will be able to ambulate at least 300' with LRAD or no AD at [facility] in order to return to job at [employer]. MET 4/25/24  Patient will be able to improve R knee flexion to at least 110 degrees in order to demonstrate normal ROM in this extremity. MET 8/13/24  Pt will improve 5x chair rise score to </= 18s without UE assist for improved muscular endurance. MET 7/11/24  Pt to perform 2 minute walk test for 350 feet or greater to improve gait speed & endurance. Progressing 9/17/24, NOT MET, 9/27/24  Pt will improve postural control with MCTSIB condition 1 score of at least 30s for decreased fall risk with standing ADLs. MET, 9/27/24  Pt will improve postural control with MCTSIB condition 2 score of at least 12s for decreased fall risk with standing ADLs. NOT MET, 9/27/24            Goals Not achieved and why:       Joshua did not meet goals for condition # 2 of the MCTSIB and 6 minute walk test. He continues to struggle with eyes closed balance and his instability when walking without his walker likely prevented him from reaching his distance goal for 6 minute walk test. Pt will likely ambulate with much more ease and stability after his L LE surgery is performed.        Discharge reason : Patient has maximized benefit from PT at this time    PLAN   This patient is discharged from [facility].     David Frankel, PT  09/27/2024        Plan     Continue to work on ambulation and stair negotiation as well as ROM of the R LE to the patient's tolerance. Focus more on standing balance and glute strength.     David Frankel, PT   9/27/2024